# Patient Record
Sex: FEMALE | Race: BLACK OR AFRICAN AMERICAN | URBAN - METROPOLITAN AREA
[De-identification: names, ages, dates, MRNs, and addresses within clinical notes are randomized per-mention and may not be internally consistent; named-entity substitution may affect disease eponyms.]

---

## 2017-01-09 ENCOUNTER — APPOINTMENT (RX ONLY)
Dept: URBAN - METROPOLITAN AREA CLINIC 23 | Facility: CLINIC | Age: 72
Setting detail: DERMATOLOGY
End: 2017-01-09

## 2017-01-09 DIAGNOSIS — L21.8 OTHER SEBORRHEIC DERMATITIS: ICD-10-CM

## 2017-01-09 DIAGNOSIS — L65.9 NONSCARRING HAIR LOSS, UNSPECIFIED: ICD-10-CM

## 2017-01-09 PROBLEM — J30.1 ALLERGIC RHINITIS DUE TO POLLEN: Status: ACTIVE | Noted: 2017-01-09

## 2017-01-09 PROBLEM — L29.8 OTHER PRURITUS: Status: ACTIVE | Noted: 2017-01-09

## 2017-01-09 PROBLEM — G40.909 EPILEPSY, UNSPECIFIED, NOT INTRACTABLE, WITHOUT STATUS EPILEPTICUS: Status: ACTIVE | Noted: 2017-01-09

## 2017-01-09 PROBLEM — M12.9 ARTHROPATHY, UNSPECIFIED: Status: ACTIVE | Noted: 2017-01-09

## 2017-01-09 PROBLEM — L85.3 XEROSIS CUTIS: Status: ACTIVE | Noted: 2017-01-09

## 2017-01-09 PROCEDURE — ? PRESCRIPTION

## 2017-01-09 PROCEDURE — 99213 OFFICE O/P EST LOW 20 MIN: CPT

## 2017-01-09 PROCEDURE — ? OTHER

## 2017-01-09 RX ORDER — FLUOCINOLONE ACETONIDE 0.11 MG/ML
OIL TOPICAL
Qty: 1 | Refills: 4 | Status: ERX

## 2017-01-09 ASSESSMENT — LOCATION SIMPLE DESCRIPTION DERM: LOCATION SIMPLE: LEFT FOREHEAD

## 2017-01-09 ASSESSMENT — LOCATION ZONE DERM: LOCATION ZONE: FACE

## 2017-01-09 ASSESSMENT — LOCATION DETAILED DESCRIPTION DERM: LOCATION DETAILED: LEFT SUPERIOR FOREHEAD

## 2017-01-09 NOTE — PROCEDURE: MIPS QUALITY
Quality 111:Pneumonia Vaccination Status For Older Adults: Pneumococcal Vaccination Previously Received
Detail Level: Detailed
Quality 130: Documentation Of Current Medications In The Medical Record: Current Medications with Name, Dosage, Frequency, or Route not Documented, Reason not Given

## 2017-01-09 NOTE — PROCEDURE: OTHER
Detail Level: Detailed
Other (Free Text): Discussed with pt that Alopecia is due to age and hormones
Note Text (......Xxx Chief Complaint.): This diagnosis correlates with the

## 2017-01-13 PROBLEM — N20.0 KIDNEY STONE: Status: ACTIVE | Noted: 2017-01-13

## 2017-01-18 PROBLEM — Z79.899 ENCOUNTER FOR MEDICATION MANAGEMENT: Status: ACTIVE | Noted: 2017-01-18

## 2017-01-18 PROBLEM — T88.7XXA MEDICATION SIDE EFFECTS: Status: ACTIVE | Noted: 2017-01-18

## 2017-01-18 PROBLEM — G45.9 TIA ON MEDICATION: Status: ACTIVE | Noted: 2017-01-18

## 2017-01-27 ENCOUNTER — APPOINTMENT (OUTPATIENT)
Dept: GENERAL RADIOLOGY | Age: 72
End: 2017-01-27
Attending: PHYSICIAN ASSISTANT
Payer: MEDICARE

## 2017-01-27 ENCOUNTER — APPOINTMENT (OUTPATIENT)
Dept: CT IMAGING | Age: 72
End: 2017-01-27
Attending: PHYSICIAN ASSISTANT
Payer: MEDICARE

## 2017-01-27 ENCOUNTER — HOSPITAL ENCOUNTER (EMERGENCY)
Age: 72
Discharge: HOME | End: 2017-01-27
Attending: EMERGENCY MEDICINE
Payer: MEDICARE

## 2017-01-27 VITALS
TEMPERATURE: 98.3 F | HEART RATE: 69 BPM | OXYGEN SATURATION: 100 % | DIASTOLIC BLOOD PRESSURE: 78 MMHG | HEIGHT: 62 IN | RESPIRATION RATE: 16 BRPM | SYSTOLIC BLOOD PRESSURE: 175 MMHG | BODY MASS INDEX: 27.05 KG/M2 | WEIGHT: 147 LBS

## 2017-01-27 DIAGNOSIS — S29.012A MUSCLE STRAIN OF RIGHT UPPER BACK, INITIAL ENCOUNTER: ICD-10-CM
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
## 2017-01-27 DIAGNOSIS — M25.512 LEFT SHOULDER PAIN, UNSPECIFIED CHRONICITY: ICD-10-CM

## 2017-01-27 PROCEDURE — 99284 EMERGENCY DEPT VISIT MOD MDM: CPT | Performed by: PHYSICIAN ASSISTANT

## 2017-01-27 PROCEDURE — 73030 X-RAY EXAM OF SHOULDER: CPT

## 2017-01-27 PROCEDURE — 70450 CT HEAD/BRAIN W/O DYE: CPT

## 2017-01-27 NOTE — DISCHARGE INSTRUCTIONS
Musculoskeletal Pain: Care Instructions  Your Care Instructions  Different problems with the bones, muscles, nerves, ligaments, and tendons in the body can cause pain. One or more areas of your body may ache or burn. Or they may feel tired, stiff, or sore. The medical term for this type of pain is musculoskeletal pain. It can have many different causes. Sometimes the pain is caused by an injury such as a strain or sprain. Or you might have pain from using one part of your body in the same way over and over again. This is called overuse. In some cases, the cause of the pain is another health problem such as arthritis or fibromyalgia. The doctor will examine you and ask you questions about your health to help find the cause of your pain. Blood tests or imaging tests like an X-ray may also be helpful. But sometimes doctors can't find a cause of the pain. Treatment depends on your symptoms and the cause of the pain, if known. The doctor has checked you carefully, but problems can develop later. If you notice any problems or new symptoms, get medical treatment right away. Follow-up care is a key part of your treatment and safety. Be sure to make and go to all appointments, and call your doctor if you are having problems. It's also a good idea to know your test results and keep a list of the medicines you take. How can you care for yourself at home? · Rest until you feel better. · Do not do anything that makes the pain worse. Return to exercise gradually if you feel better and your doctor says it's okay. · Be safe with medicines. Read and follow all instructions on the label. ¨ If the doctor gave you a prescription medicine for pain, take it as prescribed. ¨ If you are not taking a prescription pain medicine, ask your doctor if you can take an over-the-counter medicine. · Put ice or a cold pack on the area for 10 to 20 minutes at a time to ease pain. Put a thin cloth between the ice and your skin.   When should you call for help? Call your doctor now or seek immediate medical care if:  · You have new pain, or your pain gets worse. · You have new symptoms such as a fever, a rash, or chills. Watch closely for changes in your health, and be sure to contact your doctor if:  · You do not get better as expected. Where can you learn more? Go to [employer]  Enter Q624 in the search box to learn more about \"Musculoskeletal Pain: Care Instructions. \"   © 1503-8842 [employer], Incorporated. Care instructions adapted under license by [address] [place] [address] (which disclaims liability or warranty for this information). This care instruction is for use with your licensed healthcare professional. If you have questions about a medical condition or this instruction, always ask your healthcare professional. [place] [address] any warranty or liability for your use of this information.   Content Version: 79.2.716443; Current as of: November 20, 2015
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
## 2017-01-28 NOTE — ED NOTES
I have reviewed medications, follow up provider options, and discharge instructions with the patient. The patient verbalized understanding. Copy of discharge information given to patient upon discharge. Patient discharged in no distress. Patient ambulatory to waiting area with family.  No questions at this time

## 2017-01-30 ENCOUNTER — PATIENT OUTREACH (OUTPATIENT)
Dept: CASE MANAGEMENT | Age: 72
End: 2017-01-30

## 2017-01-30 NOTE — PROGRESS NOTES
Multiple attempts on 3 different numbers made through out the day with no answers and no return calls and full vmail boxes. Vj Cason, TESSAN/ Care Coordinator  [address] Jonathan  / [place], [address] [facility]  [email or web address]  This note will not be viewable in MyChart.

## 2017-03-15 ENCOUNTER — HOSPITAL ENCOUNTER (OUTPATIENT)
Dept: LAB | Age: 72
Discharge: HOME | End: 2017-03-15

## 2017-03-15 PROCEDURE — 88305 TISSUE EXAM BY PATHOLOGIST: CPT | Performed by: INTERNAL MEDICINE

## 2017-05-10 ENCOUNTER — HOSPITAL ENCOUNTER (OUTPATIENT)
Dept: GENERAL RADIOLOGY | Age: 72
Discharge: HOME | End: 2017-05-10
Attending: INTERNAL MEDICINE
Payer: MEDICARE

## 2017-05-10 VITALS — BODY MASS INDEX: 24.84 KG/M2 | WEIGHT: 135 LBS | HEIGHT: 62 IN

## 2017-05-10 DIAGNOSIS — K22.2 ESOPHAGEAL STRICTURE: ICD-10-CM

## 2017-05-10 DIAGNOSIS — Q39.6 ESOPHAGEAL DIVERTICULUM: ICD-10-CM

## 2017-05-10 DIAGNOSIS — Z98.890 HISTORY OF THYROID SURGERY: ICD-10-CM

## 2017-05-10 PROCEDURE — 74220 X-RAY XM ESOPHAGUS 1CNTRST: CPT

## 2017-05-10 PROCEDURE — 74011000250 HC RX REV CODE- 250: Performed by: INTERNAL MEDICINE

## 2017-05-10 PROCEDURE — 74011000255 HC RX REV CODE- 255: Performed by: INTERNAL MEDICINE

## 2017-05-10 RX ADMIN — BARIUM SULFATE 700 MG: 700 TABLET ORAL at 11:35

## 2017-05-10 RX ADMIN — BARIUM SULFATE 135 ML: 980 POWDER, FOR SUSPENSION ORAL at 11:33

## 2017-05-10 RX ADMIN — BARIUM SULFATE 355 ML: 0.6 SUSPENSION ORAL at 11:33

## 2017-05-10 RX ADMIN — ANTACID/ANTIFLATULENT 4 G: 380; 550; 10; 10 GRANULE, EFFERVESCENT ORAL at 11:32

## 2017-05-17 ENCOUNTER — HOSPITAL ENCOUNTER (OUTPATIENT)
Dept: LAB | Age: 72
Discharge: HOME | End: 2017-05-17

## 2017-05-17 PROCEDURE — 88305 TISSUE EXAM BY PATHOLOGIST: CPT | Performed by: INTERNAL MEDICINE

## 2017-06-07 ENCOUNTER — HOSPITAL ENCOUNTER (OUTPATIENT)
Dept: LAB | Age: 72
Discharge: HOME | End: 2017-06-07
Attending: INTERNAL MEDICINE
Payer: MEDICARE

## 2017-06-07 ENCOUNTER — HOSPITAL ENCOUNTER (OUTPATIENT)
Dept: CT IMAGING | Age: 72
Discharge: HOME | End: 2017-06-07
Attending: INTERNAL MEDICINE
Payer: MEDICARE

## 2017-06-07 DIAGNOSIS — R63.4 WEIGHT LOSS: ICD-10-CM

## 2017-06-07 DIAGNOSIS — K22.10 ESOPHAGEAL EROSIONS: ICD-10-CM

## 2017-06-07 LAB — CREAT SERPL-MCNC: 1.1 MG/DL (ref 0.6–1)

## 2017-06-07 PROCEDURE — 82565 ASSAY OF CREATININE: CPT | Performed by: INTERNAL MEDICINE

## 2017-06-07 PROCEDURE — 74011000258 HC RX REV CODE- 258: Performed by: INTERNAL MEDICINE

## 2017-06-07 PROCEDURE — 74160 CT ABDOMEN W/CONTRAST: CPT

## 2017-06-07 PROCEDURE — 74011636320 HC RX REV CODE- 636/320: Performed by: INTERNAL MEDICINE

## 2017-06-07 PROCEDURE — 36415 COLL VENOUS BLD VENIPUNCTURE: CPT | Performed by: INTERNAL MEDICINE

## 2017-06-07 RX ORDER — SODIUM CHLORIDE 0.9 % (FLUSH) 0.9 %
10 SYRINGE (ML) INJECTION
Status: COMPLETED | OUTPATIENT
Start: 2017-06-07 | End: 2017-06-07

## 2017-06-07 RX ADMIN — DIATRIZOATE MEGLUMINE AND DIATRIZOATE SODIUM 15 ML: 660; 100 LIQUID ORAL; RECTAL at 15:10

## 2017-06-07 RX ADMIN — Medication 10 ML: at 15:10

## 2017-06-07 RX ADMIN — SODIUM CHLORIDE 100 ML: 900 INJECTION, SOLUTION INTRAVENOUS at 15:10

## 2017-06-07 RX ADMIN — IOPAMIDOL 100 ML: 755 INJECTION, SOLUTION INTRAVENOUS at 15:10

## 2017-07-12 PROBLEM — K80.20 ASYMPTOMATIC GALLSTONES: Status: ACTIVE | Noted: 2017-07-12

## 2017-08-02 ENCOUNTER — APPOINTMENT (RX ONLY)
Dept: URBAN - METROPOLITAN AREA CLINIC 23 | Facility: CLINIC | Age: 72
Setting detail: DERMATOLOGY
End: 2017-08-02

## 2017-08-02 DIAGNOSIS — L82.1 OTHER SEBORRHEIC KERATOSIS: ICD-10-CM

## 2017-08-02 DIAGNOSIS — L85.3 XEROSIS CUTIS: ICD-10-CM

## 2017-08-02 DIAGNOSIS — L21.8 OTHER SEBORRHEIC DERMATITIS: ICD-10-CM

## 2017-08-02 PROBLEM — E03.9 HYPOTHYROIDISM, UNSPECIFIED: Status: ACTIVE | Noted: 2017-08-02

## 2017-08-02 PROBLEM — L29.8 OTHER PRURITUS: Status: ACTIVE | Noted: 2017-08-02

## 2017-08-02 PROCEDURE — 99213 OFFICE O/P EST LOW 20 MIN: CPT

## 2017-08-02 PROCEDURE — ? PRESCRIPTION

## 2017-08-02 PROCEDURE — ? TREATMENT REGIMEN

## 2017-08-02 PROCEDURE — ? COUNSELING

## 2017-08-02 RX ORDER — FLUOCINOLONE ACETONIDE 0.11 MG/ML
OIL TOPICAL
Qty: 1 | Refills: 5 | Status: ERX

## 2017-08-02 ASSESSMENT — LOCATION SIMPLE DESCRIPTION DERM
LOCATION SIMPLE: UPPER BACK
LOCATION SIMPLE: RIGHT CHEEK
LOCATION SIMPLE: RIGHT THIGH
LOCATION SIMPLE: RIGHT PRETIBIAL REGION
LOCATION SIMPLE: LEFT PRETIBIAL REGION
LOCATION SIMPLE: LEFT THIGH
LOCATION SIMPLE: RIGHT FOREHEAD

## 2017-08-02 ASSESSMENT — LOCATION ZONE DERM
LOCATION ZONE: TRUNK
LOCATION ZONE: FACE
LOCATION ZONE: LEG

## 2017-08-02 ASSESSMENT — LOCATION DETAILED DESCRIPTION DERM
LOCATION DETAILED: LEFT DISTAL PRETIBIAL REGION
LOCATION DETAILED: INFERIOR THORACIC SPINE
LOCATION DETAILED: LEFT ANTERIOR DISTAL THIGH
LOCATION DETAILED: RIGHT DISTAL PRETIBIAL REGION
LOCATION DETAILED: RIGHT ANTERIOR DISTAL THIGH
LOCATION DETAILED: RIGHT MEDIAL MALAR CHEEK
LOCATION DETAILED: RIGHT INFERIOR FOREHEAD

## 2017-08-02 NOTE — PROCEDURE: TREATMENT REGIMEN
Plan: Advised to use less soap when washing body then apply a thick moisturizer
Detail Level: Zone
Continue Regimen: Vaseline daily

## 2017-08-02 NOTE — PROCEDURE: MIPS QUALITY
Quality 111:Pneumonia Vaccination Status For Older Adults: Pneumococcal Vaccination Previously Received
Quality 130: Documentation Of Current Medications In The Medical Record: Current Medications with Name, Dosage, Frequency, or Route not Documented, Reason not Given
Detail Level: Detailed

## 2017-10-27 PROBLEM — R47.89 EPISODES OF SPEECH ARREST: Status: ACTIVE | Noted: 2017-10-27

## 2017-10-30 ENCOUNTER — HOSPITAL ENCOUNTER (OUTPATIENT)
Dept: MAMMOGRAPHY | Age: 72
Discharge: HOME | End: 2017-10-30
Attending: FAMILY MEDICINE
Payer: MEDICARE

## 2017-10-30 VITALS
OXYGEN SATURATION: 99 % | SYSTOLIC BLOOD PRESSURE: 219 MMHG | DIASTOLIC BLOOD PRESSURE: 96 MMHG | TEMPERATURE: 97.4 F | HEART RATE: 68 BPM

## 2017-10-30 DIAGNOSIS — R92.1 BREAST CALCIFICATION, RIGHT: ICD-10-CM

## 2017-10-30 PROCEDURE — 19081 BX BREAST 1ST LESION STRTCTC: CPT

## 2017-10-30 PROCEDURE — 77065 DX MAMMO INCL CAD UNI: CPT

## 2017-10-30 PROCEDURE — 88305 TISSUE EXAM BY PATHOLOGIST: CPT | Performed by: FAMILY MEDICINE

## 2017-10-30 PROCEDURE — 74011250636 HC RX REV CODE- 250/636: Performed by: RADIOLOGY

## 2017-10-30 PROCEDURE — 74011250636 HC RX REV CODE- 250/636: Performed by: FAMILY MEDICINE

## 2017-10-30 RX ADMIN — SODIUM CHLORIDE 250 ML: 900 INJECTION, SOLUTION INTRAVENOUS at 09:47

## 2017-10-30 RX ADMIN — MEPIVACAINE HYDROCHLORIDE 13.5 ML: 20 INJECTION, SOLUTION EPIDURAL; INFILTRATION at 09:43

## 2018-07-31 ENCOUNTER — ANESTHESIA EVENT (OUTPATIENT)
Dept: ENDOSCOPY | Age: 73
End: 2018-07-31
Payer: MEDICARE

## 2018-07-31 RX ORDER — ONDANSETRON 2 MG/ML
4 INJECTION INTRAMUSCULAR; INTRAVENOUS ONCE
Status: CANCELLED | OUTPATIENT
Start: 2018-07-31 | End: 2018-07-31

## 2018-07-31 RX ORDER — SODIUM CHLORIDE, SODIUM LACTATE, POTASSIUM CHLORIDE, CALCIUM CHLORIDE 600; 310; 30; 20 MG/100ML; MG/100ML; MG/100ML; MG/100ML
100 INJECTION, SOLUTION INTRAVENOUS CONTINUOUS
Status: CANCELLED | OUTPATIENT
Start: 2018-07-31

## 2018-07-31 RX ORDER — NALOXONE HYDROCHLORIDE 0.4 MG/ML
0.1 INJECTION, SOLUTION INTRAMUSCULAR; INTRAVENOUS; SUBCUTANEOUS AS NEEDED
Status: CANCELLED | OUTPATIENT
Start: 2018-07-31

## 2018-07-31 RX ORDER — OXYCODONE HYDROCHLORIDE 5 MG/1
10 TABLET ORAL
Status: CANCELLED | OUTPATIENT
Start: 2018-07-31 | End: 2018-08-01

## 2018-07-31 RX ORDER — OXYCODONE HYDROCHLORIDE 5 MG/1
5 TABLET ORAL
Status: CANCELLED | OUTPATIENT
Start: 2018-07-31 | End: 2018-08-01

## 2018-07-31 RX ORDER — HYDROMORPHONE HYDROCHLORIDE 2 MG/ML
0.5 INJECTION, SOLUTION INTRAMUSCULAR; INTRAVENOUS; SUBCUTANEOUS
Status: CANCELLED | OUTPATIENT
Start: 2018-07-31

## 2018-07-31 RX ORDER — ALBUTEROL SULFATE 0.83 MG/ML
2.5 SOLUTION RESPIRATORY (INHALATION) AS NEEDED
Status: CANCELLED | OUTPATIENT
Start: 2018-07-31

## 2018-08-01 ENCOUNTER — APPOINTMENT (OUTPATIENT)
Dept: GENERAL RADIOLOGY | Age: 73
End: 2018-08-01
Attending: INTERNAL MEDICINE
Payer: MEDICARE

## 2018-08-01 ENCOUNTER — HOSPITAL ENCOUNTER (OUTPATIENT)
Age: 73
Setting detail: OUTPATIENT SURGERY
Discharge: HOME | End: 2018-08-01
Attending: INTERNAL MEDICINE | Admitting: INTERNAL MEDICINE
Payer: MEDICARE

## 2018-08-01 ENCOUNTER — ANESTHESIA (OUTPATIENT)
Dept: ENDOSCOPY | Age: 73
End: 2018-08-01
Payer: MEDICARE

## 2018-08-01 VITALS
HEART RATE: 59 BPM | TEMPERATURE: 98.6 F | DIASTOLIC BLOOD PRESSURE: 83 MMHG | SYSTOLIC BLOOD PRESSURE: 169 MMHG | RESPIRATION RATE: 21 BRPM | HEIGHT: 62 IN | WEIGHT: 126 LBS | BODY MASS INDEX: 23.19 KG/M2 | OXYGEN SATURATION: 100 %

## 2018-08-01 PROCEDURE — 76060000032 HC ANESTHESIA 0.5 TO 1 HR: Performed by: INTERNAL MEDICINE

## 2018-08-01 PROCEDURE — 74011250636 HC RX REV CODE- 250/636: Performed by: ANESTHESIOLOGY

## 2018-08-01 PROCEDURE — 74011250636 HC RX REV CODE- 250/636

## 2018-08-01 PROCEDURE — 76040000025: Performed by: INTERNAL MEDICINE

## 2018-08-01 RX ORDER — FENTANYL CITRATE 50 UG/ML
100 INJECTION, SOLUTION INTRAMUSCULAR; INTRAVENOUS ONCE
Status: DISCONTINUED | OUTPATIENT
Start: 2018-08-01 | End: 2018-08-01 | Stop reason: HOSPADM

## 2018-08-01 RX ORDER — MIDAZOLAM HYDROCHLORIDE 1 MG/ML
2 INJECTION, SOLUTION INTRAMUSCULAR; INTRAVENOUS
Status: DISCONTINUED | OUTPATIENT
Start: 2018-08-01 | End: 2018-08-01 | Stop reason: HOSPADM

## 2018-08-01 RX ORDER — PROPOFOL 10 MG/ML
INJECTION, EMULSION INTRAVENOUS
Status: DISCONTINUED | OUTPATIENT
Start: 2018-08-01 | End: 2018-08-01 | Stop reason: HOSPADM

## 2018-08-01 RX ORDER — MIDAZOLAM HYDROCHLORIDE 1 MG/ML
2 INJECTION, SOLUTION INTRAMUSCULAR; INTRAVENOUS ONCE
Status: DISCONTINUED | OUTPATIENT
Start: 2018-08-01 | End: 2018-08-01 | Stop reason: HOSPADM

## 2018-08-01 RX ORDER — PROPOFOL 10 MG/ML
INJECTION, EMULSION INTRAVENOUS AS NEEDED
Status: DISCONTINUED | OUTPATIENT
Start: 2018-08-01 | End: 2018-08-01 | Stop reason: HOSPADM

## 2018-08-01 RX ORDER — SODIUM CHLORIDE, SODIUM LACTATE, POTASSIUM CHLORIDE, CALCIUM CHLORIDE 600; 310; 30; 20 MG/100ML; MG/100ML; MG/100ML; MG/100ML
100 INJECTION, SOLUTION INTRAVENOUS CONTINUOUS
Status: DISCONTINUED | OUTPATIENT
Start: 2018-08-01 | End: 2018-08-01 | Stop reason: HOSPADM

## 2018-08-01 RX ADMIN — PROPOFOL 160 MCG/KG/MIN: 10 INJECTION, EMULSION INTRAVENOUS at 15:11

## 2018-08-01 RX ADMIN — PROPOFOL 50 MG: 10 INJECTION, EMULSION INTRAVENOUS at 15:11

## 2018-08-01 RX ADMIN — SODIUM CHLORIDE, SODIUM LACTATE, POTASSIUM CHLORIDE, AND CALCIUM CHLORIDE 100 ML/HR: 600; 310; 30; 20 INJECTION, SOLUTION INTRAVENOUS at 14:02

## 2018-08-01 NOTE — ROUTINE PROCESS
Pt discharged home via wheelchair with family. Discharge instructions given to patient and family. Vital signs stable upon discharge.

## 2018-08-01 NOTE — ANESTHESIA PREPROCEDURE EVALUATION
Anesthetic History Review of Systems / Medical History Patient summary reviewed, nursing notes reviewed and pertinent labs reviewed Pulmonary Pertinent negatives: No sleep apnea Neuro/Psych  
 
seizures: well controlled Cardiovascular Exercise tolerance: >4 METS 
  
GI/Hepatic/Renal 
  
 
 
 
 
 
 Endo/Other Hypothyroidism: well controlled Other Findings Physical Exam 
 
Airway Mallampati: II 
TM Distance: 4 - 6 cm Neck ROM: normal range of motion Mouth opening: Normal 
 
 Cardiovascular Regular rate and rhythm,  S1 and S2 normal,  no murmur, click, rub, or gallop Dental 
 
Dentition: Edentulous Pulmonary Breath sounds clear to auscultation Abdominal 
 
 
 
 Other Findings Anesthetic Plan ASA: 3 Anesthesia type: total IV anesthesia Induction: Intravenous Anesthetic plan and risks discussed with: Patient

## 2018-08-01 NOTE — DISCHARGE INSTRUCTIONS
Gastrointestinal Esophagogastroduodenoscopy (EGD) - Upper Exam Discharge Instructions    1. Call Dr. Maribeth Sevilla at [phone] for any problems or questions. 2. Contact the doctor's office for follow up appointment as directed. 3. Medication may cause drowsiness for several hours, therefore, do not drive or  operate machinery for remainder of the day. 4. No alcohol today. 5. Ordinarily, you may resume regular diet and activity after exam unless otherwise  specified by your physician. 6. For mild soreness in your throat you may use Cepacol throat lozenges or warm salt-water gargles as needed. Any additional instructions: Instructions given to [address] and other family members.   Instructions given by:

## 2018-08-01 NOTE — ANESTHESIA POSTPROCEDURE EVALUATION
Post-Anesthesia Evaluation and Assessment Patient: Indira Navarrete MRN: [id number]  SSN: [id number] YOB: 1945  Age: 67 y.o. Sex: female Cardiovascular Function/Vital Signs Visit Vitals  /83  Pulse (!) 59  Temp 37 °C (98.6 °F)  Resp 14  
 Ht 5' 2\" (1.575 m)  Wt 57.2 kg (126 lb)  SpO2 100%  BMI 23.05 kg/m2 Patient is status post total IV anesthesia anesthesia for Procedure(s): ESOPHAGOGASTRODUODENOSCOPY (EGD)/ BMI=23 ESOPHAGEAL DILATION. Nausea/Vomiting: None Postoperative hydration reviewed and adequate. Pain: 
Pain Scale 1: Numeric (0 - 10) (08/01/18 1544) Pain Intensity 1: 0 (08/01/18 1544) Managed Neurological Status: At baseline Mental Status and Level of Consciousness: Alert and oriented Pulmonary Status:  
O2 Device: Nasal cannula (08/01/18 1544) Adequate oxygenation and airway patent Complications related to anesthesia: None Post-anesthesia assessment completed. No concerns Signed By: Bree William MD   
 August 1, 2018

## 2018-08-02 NOTE — PROCEDURES
OPERATIVE NOTE    Date of Procedure: 2018   Preoperative Diagnosis: Dysphagia, Abnormal weight loss [R63.4]  High esophageal stricture with inability to advance a standard GIF Adult Endoscope through the stricture on attempted EGD 18. Postoperative Diagnosis: High esophageal stricture - dilated with Savary dilators to 12 mm over guide wire under Fluoro. Procedure(s):  ESOPHAGOGASTRODUODENOSCOPY (EGD)/ BMI=23  ESOPHAGEAL DILATION  Surgeon(s) and Role:     * Duarte Bautista MD - Primary  Surgical Staff:  Endoscopy RN-1: Peewee Caldwell RN  Respiratory Therapist: Leticia Forte, RT  No case tracking events are documented in the log. Anesthesia: MAC   Estimated Blood Loss: < 20 mL  Specimens: * No specimens in log *     Procedure:  After obtaining informed consent, the patient was brought to the Fluoroscopy Suite and placed in the left lateral position on Fluoroscopy table and received IV anesthesia. See anesthesia records for details. The  endoscope was advanced under direct vision without difficulty. A stricture and angulation was noted just distal to the Upper esophageal sphincter. The esophagus, stomach (including retroflexed views) and duodenum were evaluated. Leaving the guide wire in place the scope was withdrawn and Savary Dilation performed - 8 mm without resistance or heme, 10, 11 and 12 mm with mild resistance and bleeding. Repeat Endoscopy was performed with a standard adult GIF endoscope . The patient was taken to the recovery area in stable condition. COMMENTS:  8,10, 11 and 12 mm Savary dilators were passed sequentially in the usual fashion without difficulty under fluoroscopy guidance after the initial endoscopy. Mild resistance and small amount of blood was noted on the larger three dilators. Repeat endoscopy after dilation revealed mild superficial tear about 15 -18 cms from the incisors indicating successful dilation.  There was mild bleeding with spontaneous hemostasis confirmed before withdrawal.    Findings:  OROPHARYNX:  Cords and Pyriform recesses appeared normal.  ESOPHAGUS:  The proximal esophagus at the esophageal inlet just distal to the cricopharyngeus showed marked narrowing and angulation. The mid esophagus showed mild mucosal traum after dilation. The distal esophagus appeared normal. The Z line was intact without evidence of esophagitis, ulcerations, stricture or Hickey's. See Dilation and post dilation findings above. STOMACH:  The fundus on antegrade and retroflexed views was normal. The body, antrum and pylorus also appeared normal.  DUODENUM:  The bulb and second portions appeared normal.    Recommendations:   Routine post endoscopy - dilation instructions. Soft diet today - to be advanced as tolerated. Dysphagia precautions with avoidance of chunky meats, dry bread and large pills. Office will call to schedule FU OV or repeat EGD and dilation with Dr. Pola See based on clinical course. She is also advised to call our Office next week to report on her progress and arrange followup. Findings and recommendations were reviewed with patient and attendant (sister) in recovery area after the procedure. Complications: None.     Implants: * No implants in log *    Thomas Arreguin MD

## 2018-11-10 ENCOUNTER — ANESTHESIA EVENT (OUTPATIENT)
Dept: ENDOSCOPY | Age: 73
End: 2018-11-10
Payer: MEDICARE

## 2018-11-10 RX ORDER — SODIUM CHLORIDE 0.9 % (FLUSH) 0.9 %
5-10 SYRINGE (ML) INJECTION AS NEEDED
Status: CANCELLED | OUTPATIENT
Start: 2018-11-10

## 2018-11-10 RX ORDER — SODIUM CHLORIDE, SODIUM LACTATE, POTASSIUM CHLORIDE, CALCIUM CHLORIDE 600; 310; 30; 20 MG/100ML; MG/100ML; MG/100ML; MG/100ML
100 INJECTION, SOLUTION INTRAVENOUS CONTINUOUS
Status: CANCELLED | OUTPATIENT
Start: 2018-11-10

## 2018-11-12 ENCOUNTER — ANESTHESIA (OUTPATIENT)
Dept: ENDOSCOPY | Age: 73
End: 2018-11-12
Payer: MEDICARE

## 2018-11-12 ENCOUNTER — HOSPITAL ENCOUNTER (OUTPATIENT)
Age: 73
Setting detail: OUTPATIENT SURGERY
Discharge: HOME | End: 2018-11-12
Attending: INTERNAL MEDICINE | Admitting: INTERNAL MEDICINE
Payer: MEDICARE

## 2018-11-12 VITALS
OXYGEN SATURATION: 97 % | TEMPERATURE: 98.1 F | HEIGHT: 62 IN | HEART RATE: 66 BPM | SYSTOLIC BLOOD PRESSURE: 154 MMHG | RESPIRATION RATE: 18 BRPM | WEIGHT: 126 LBS | DIASTOLIC BLOOD PRESSURE: 76 MMHG | BODY MASS INDEX: 23.19 KG/M2

## 2018-11-12 PROCEDURE — 76040000025: Performed by: INTERNAL MEDICINE

## 2018-11-12 PROCEDURE — 74011250636 HC RX REV CODE- 250/636: Performed by: ANESTHESIOLOGY

## 2018-11-12 PROCEDURE — 76060000031 HC ANESTHESIA FIRST 0.5 HR: Performed by: INTERNAL MEDICINE

## 2018-11-12 PROCEDURE — 74011250636 HC RX REV CODE- 250/636

## 2018-11-12 RX ORDER — PROPOFOL 10 MG/ML
INJECTION, EMULSION INTRAVENOUS AS NEEDED
Status: DISCONTINUED | OUTPATIENT
Start: 2018-11-12 | End: 2018-11-12 | Stop reason: HOSPADM

## 2018-11-12 RX ORDER — SODIUM CHLORIDE, SODIUM LACTATE, POTASSIUM CHLORIDE, CALCIUM CHLORIDE 600; 310; 30; 20 MG/100ML; MG/100ML; MG/100ML; MG/100ML
100 INJECTION, SOLUTION INTRAVENOUS CONTINUOUS
Status: DISCONTINUED | OUTPATIENT
Start: 2018-11-12 | End: 2018-11-12 | Stop reason: HOSPADM

## 2018-11-12 RX ORDER — PROPOFOL 10 MG/ML
INJECTION, EMULSION INTRAVENOUS
Status: DISCONTINUED | OUTPATIENT
Start: 2018-11-12 | End: 2018-11-12 | Stop reason: HOSPADM

## 2018-11-12 RX ADMIN — PROPOFOL 20 MG: 10 INJECTION, EMULSION INTRAVENOUS at 11:08

## 2018-11-12 RX ADMIN — SODIUM CHLORIDE, SODIUM LACTATE, POTASSIUM CHLORIDE, AND CALCIUM CHLORIDE 100 ML/HR: 600; 310; 30; 20 INJECTION, SOLUTION INTRAVENOUS at 10:00

## 2018-11-12 RX ADMIN — PROPOFOL 30 MG: 10 INJECTION, EMULSION INTRAVENOUS at 11:05

## 2018-11-12 RX ADMIN — PROPOFOL 100 MCG/KG/MIN: 10 INJECTION, EMULSION INTRAVENOUS at 11:05

## 2018-11-12 NOTE — ROUTINE PROCESS
Pt. Discharged to car by Richelle Chavira with family . Vital signs stable. Able to tolerate PO fluids. Passing gas.  Seen by MD.

## 2018-11-12 NOTE — H&P
Gastroenterology Outpatient History and Physical 
 
Patient: James Mercado Physician: Ann Hamm MD 
 
Vital Signs: Blood pressure 195/86, pulse 73, temperature 98.1 °F (36.7 °C), resp. rate 14, height 5' 2\" (1.575 m), weight 57.2 kg (126 lb), SpO2 100 %, not currently breastfeeding. Allergies: Allergies Allergen Reactions  Bee Sting [Sting, Bee] Anaphylaxis  Benadryl [Diphenhydramine Hcl] Seizures  Lidocaine Other (comments)  
  jittery and lost voice; trembling; stuttering  Amoxicillin Other (comments)  
  stuttering  Clinoril [Sulindac] Hives  Lipitor [Atorvastatin] Myalgia  Lortab [Hydrocodone-Acetaminophen] Other (comments) affected speech  Lotabs Other (comments) Effects speech  Methotrexate Other (comments)  
  messed up immune system  Remicade [Infliximab] Other (comments)  
  messed up immune system  Travatan [Travoprost (Benzalkonium)] Other (comments) elevated BP Chief Complaint: dysphagia, esophageal stricture History of Present Illness: 75 yr old female with esophageal stricture s/p dilation in the past here for subsequent dilation Justification for Procedure: esophageal stircture History: 
Past Medical History:  
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   
 cpap Past Surgical History:  
Procedure Laterality Date  HX BREAST BIOPSY Right 10/30/2017  HX CATARACT REMOVAL    
 HX COLONOSCOPY  03/15/2017 Dr Kai Carson  HX HEENT    
 thyroid surgery [place]  HX HYSTERECTOMY  1980  
 secondary to bleeding; fibroids  HX THYROIDECTOMY  06/2005 Dr. Jalyn Phillips Social History Socioeconomic History  Marital status:  Spouse name: Not on file  Number of children: Not on file  Years of education: Not on file  Highest education level: Not on file Social Needs  Financial resource strain: Not on file  Food insecurity - worry: Not on file  Food insecurity - inability: Not on file  Transportation needs - medical: Not on file  Transportation needs - non-medical: Not on file Occupational History  Not on file Tobacco Use  Smoking status: Never Smoker  Smokeless tobacco: Never Used Substance and Sexual Activity  Alcohol use: No  
 Drug use: No  
 Sexual activity: Not on file Other Topics Concern  Not on file Social History Narrative  Not on file Family History Problem Relation Age of Onset  Heart Attack Father  Heart Disease Father  Kidney Disease Mother Medications:  
Prior to Admission medications Medication Sig Start Date End Date Taking? Authorizing Provider  
levothyroxine (SYNTHROID) 88 mcg tablet Take 1 Tab by mouth Daily (before breakfast). 8/13/18  Yes La Mae MD  
cyanocobalamin (VITAMIN B12) 1,000 mcg/mL injection 1 mL by IntraMUSCular route every thirty (30) days. 6/29/18  Yes La Mae MD  
predniSONE (DELTASONE) 5 mg tablet TAKE 1 TABLET BY MOUTH daily 1/6/17  Yes Provider, Historical  
diclofenac-misoprostol (ARTHROTEC 75)  mg-mcg per tablet Take 1 Tab by mouth two (2) times a day. Yes Provider, Historical  
ABATACEPT/MALTOSE (ORENCIA IV) by IntraVENous route every thirty (30) days. Yes Provider, Historical  
famotidine (PEPCID) 40 mg/5 mL (8 mg/mL) suspension Take 5 mL by mouth two (2) times a day. 5/29/18   Kraig Costello MD  
gabapentin (NEURONTIN) 100 mg capsule Take 1 Cap by mouth two (2) times a day. 5/29/18   Kraig Costello MD  
diclofenac sodium (PENNSAID) 20 mg/gram /actuation(2 %) sopm 2 Pump(s) by Apply Externally route two (2) times a day. 2/1/17   Gilson Padilla MD  
 
 
Physical Exam:  
General: no distress HEENT: Head: Normocephalic, no lesions, without obvious abnormality. Heart: regular rate and rhythm, S1, S2 normal, no murmur, click, rub or gallop Lungs: chest clear, no wheezing, rales, normal symmetric air entry Abdominal: Bowel sounds are normal, liver is not enlarged, spleen is not enlarged Neurological: Grossly normal  
Extremities: extremities normal, atraumatic, no cyanosis or edema Findings/Diagnosis: esophageal stricture, dysphagia Plan of Care/Planned Procedure: egd with dilation Signed By: Vicki Dennis MD   
 November 12, 2018

## 2018-11-12 NOTE — ANESTHESIA PREPROCEDURE EVALUATION
Anesthetic History No history of anesthetic complications Review of Systems / Medical History Patient summary reviewed, nursing notes reviewed and pertinent labs reviewed Pulmonary Sleep apnea: CPAP Neuro/Psych Cardiovascular Hyperlipidemia Exercise tolerance: <4 METS Comments: Denies CP, SOB or changes in functional status GI/Hepatic/Renal 
  
GERD: well controlled Renal disease: stones PUD Endo/Other Hypothyroidism: well controlled Arthritis (RA) Other Findings Physical Exam 
 
Airway Mallampati: III 
TM Distance: 4 - 6 cm Neck ROM: normal range of motion Mouth opening: Normal 
 
 Cardiovascular Rhythm: regular Rate: normal 
 
 
 
 Dental 
 
Dentition: Full lower dentures and Full upper dentures Pulmonary Breath sounds clear to auscultation Abdominal 
 
 
 
 Other Findings Anesthetic Plan ASA: 2 Anesthesia type: total IV anesthesia Anesthetic plan and risks discussed with: Patient

## 2018-11-12 NOTE — DISCHARGE INSTRUCTIONS
Gastrointestinal Esophagogastroduodenoscopy (EGD) - Upper Exam Discharge Instructions    1. Call Dr. Nuria Thorne at [phone] for any problems or questions. 2. Contact the doctor's office for follow up appointment as directed. 3. Medication may cause drowsiness for several hours, therefore, do not drive or operate machinery for remainder of the day. 4. No alcohol today. 5. Ordinarily, you may resume regular diet and activity after exam unless otherwise specified by your physician. 6. For mild soreness in your throat you may use throat lozenges or warm salt-water gargles as needed. Any additional instructions:   Follow up as needed

## 2018-11-12 NOTE — PROCEDURES
Esophagogastroduodenoscopy    DATE of PROCEDURE: 11/12/2018    MEDICATIONS ADMINISTERED: MAC    INSTRUMENT: GIF    PROCEDURE:  After obtaining informed consent, the patient was placed in the left lateral position and sedated. The endoscope was advanced under direct vision without difficulty. The esophagus, stomach (including retroflexed views) and duodenum were evaluated. The patient was taken to the recovery area in stable condition. FINDINGS:  ESOPHAGUS:  Upon entry into the esophagus at about 15cm from entry there was evidence of prior seen esophageal stricture. This area was tortuous but able to be traversed today with EGD scope with minimal trauma. A 12 mm Savary dilator was passed in the usual fashion with mild resistance and small amount of blood was noted on the the dilators. This was followed by a 14mm savary with moderate resistance. Repeat endoscopy after dilation revealed Moderate rent and self-limited heme noted from about 15 -18 cms from the incisors indicating successful dilation.   STOMACH: Mild antral gastritis  DUODENUM: Normal     Estimated blood loss: 0-minimal     PLAN:  1. Repeat dilation in 4-6 weeks at the hospital again - fluoro not necessary  2.  Liquid x 24 hours    Emma Loredo MD  [facility], [address]

## 2018-11-12 NOTE — ANESTHESIA POSTPROCEDURE EVALUATION
Procedure(s): ESOPHAGOGASTRODUODENOSCOPY (EGD) / BMI 21 ESOPHAGEAL DILATION. Anesthesia Post Evaluation Multimodal analgesia: multimodal analgesia not used between 6 hours prior to anesthesia start to PACU discharge Patient location during evaluation: bedside Patient participation: complete - patient participated Level of consciousness: awake Pain score: 1 Pain management: adequate Airway patency: patent Anesthetic complications: no 
Cardiovascular status: acceptable Respiratory status: acceptable Hydration status: acceptable Comments: Pt doing well. Post anesthesia nausea and vomiting:  none Visit Vitals /60 Pulse 81 Temp 36.7 °C (98.1 °F) Resp 17 Ht 5' 2\" (1.575 m) Wt 57.2 kg (126 lb) SpO2 100% Breastfeeding? No  
BMI 23.05 kg/m²

## 2018-12-05 ENCOUNTER — HOSPITAL ENCOUNTER (OUTPATIENT)
Dept: MAMMOGRAPHY | Age: 73
Discharge: HOME | End: 2018-12-05
Attending: FAMILY MEDICINE
Payer: MEDICARE

## 2018-12-05 DIAGNOSIS — Z12.39 BREAST CANCER SCREENING, HIGH RISK PATIENT: ICD-10-CM

## 2018-12-05 PROCEDURE — 77067 SCR MAMMO BI INCL CAD: CPT

## 2019-01-14 PROBLEM — Z87.19 HISTORY OF ESOPHAGEAL STRICTURE: Status: ACTIVE | Noted: 2019-01-14

## 2019-01-21 ENCOUNTER — HOSPITAL ENCOUNTER (OUTPATIENT)
Dept: CT IMAGING | Age: 74
Discharge: HOME | End: 2019-01-21
Attending: FAMILY MEDICINE
Payer: MEDICARE

## 2019-01-21 DIAGNOSIS — N20.0 KIDNEY STONE: ICD-10-CM

## 2019-01-21 PROCEDURE — 74176 CT ABD & PELVIS W/O CONTRAST: CPT

## 2019-01-22 NOTE — PROGRESS NOTES
Spoke to MsAnnie Rose and made her aware that we do not need to have her go to urology as her CT did not show stones. She is aware she has gallstones and they are not causing trouble so we will monitor them for now. She was made aware that she does have some abnormalities with her liver. She is seeing Gastroenterology next week to follow up about her GI bleed, and we will send this CT to them for an opinion regarding the liver abnormalities and next steps.  Of note, her liver blood work has been normal.

## 2019-02-21 ENCOUNTER — ANESTHESIA EVENT (OUTPATIENT)
Dept: ENDOSCOPY | Age: 74
End: 2019-02-21
Payer: MEDICARE

## 2019-02-21 RX ORDER — SODIUM CHLORIDE 0.9 % (FLUSH) 0.9 %
5-40 SYRINGE (ML) INJECTION AS NEEDED
Status: CANCELLED | OUTPATIENT
Start: 2019-02-21

## 2019-02-21 RX ORDER — SODIUM CHLORIDE, SODIUM LACTATE, POTASSIUM CHLORIDE, CALCIUM CHLORIDE 600; 310; 30; 20 MG/100ML; MG/100ML; MG/100ML; MG/100ML
100 INJECTION, SOLUTION INTRAVENOUS CONTINUOUS
Status: CANCELLED | OUTPATIENT
Start: 2019-02-21

## 2019-02-21 RX ORDER — SODIUM CHLORIDE 0.9 % (FLUSH) 0.9 %
5-40 SYRINGE (ML) INJECTION EVERY 8 HOURS
Status: CANCELLED | OUTPATIENT
Start: 2019-02-21

## 2019-02-21 NOTE — ANESTHESIA PREPROCEDURE EVALUATION
Anesthetic History Review of Systems / Medical History Patient summary reviewed, nursing notes reviewed and pertinent labs reviewed Pulmonary Sleep apnea: CPAP Neuro/Psych Cardiovascular Exercise tolerance: >4 METS Comments: TTE 1/5/19:  LVEF >65%. Mild MR, TR  
GI/Hepatic/Renal 
  
GERD 
 
 
PUD (remote hx) Comments: Esophageal stricture Endo/Other Arthritis (rheumatoid) Other Findings Comments: Acute blood loss anemia Physical Exam 
 
Airway Mallampati: I 
TM Distance: 4 - 6 cm Neck ROM: normal range of motion Mouth opening: Normal 
 
 Cardiovascular Regular rate and rhythm,  S1 and S2 normal,  no murmur, click, rub, or gallop Dental 
 
Dentition: Full lower dentures and Full upper dentures Pulmonary Breath sounds clear to auscultation Abdominal 
GI exam deferred Other Findings Anesthetic Plan ASA: 2 Anesthesia type: total IV anesthesia Anesthetic plan and risks discussed with: Patient and Spouse

## 2019-02-22 ENCOUNTER — ANESTHESIA (OUTPATIENT)
Dept: ENDOSCOPY | Age: 74
End: 2019-02-22
Payer: MEDICARE

## 2019-02-22 ENCOUNTER — HOSPITAL ENCOUNTER (OUTPATIENT)
Age: 74
Setting detail: OUTPATIENT SURGERY
Discharge: HOME | End: 2019-02-22
Attending: INTERNAL MEDICINE | Admitting: INTERNAL MEDICINE
Payer: MEDICARE

## 2019-02-22 VITALS
TEMPERATURE: 98.6 F | DIASTOLIC BLOOD PRESSURE: 74 MMHG | HEART RATE: 66 BPM | RESPIRATION RATE: 15 BRPM | WEIGHT: 123 LBS | SYSTOLIC BLOOD PRESSURE: 165 MMHG | BODY MASS INDEX: 22.63 KG/M2 | HEIGHT: 62 IN | OXYGEN SATURATION: 100 %

## 2019-02-22 LAB
ALBUMIN SERPL-MCNC: 2.7 G/DL (ref 3.2–4.6)
ALBUMIN/GLOB SERPL: 0.7 {RATIO} (ref 1.2–3.5)
ALP SERPL-CCNC: 91 U/L (ref 50–136)
ALT SERPL-CCNC: 25 U/L (ref 12–65)
AST SERPL-CCNC: 31 U/L (ref 15–37)
BASOPHILS # BLD: 0 K/UL (ref 0–0.2)
BASOPHILS NFR BLD: 1 % (ref 0–2)
BILIRUB DIRECT SERPL-MCNC: 0.2 MG/DL
BILIRUB SERPL-MCNC: 0.6 MG/DL (ref 0.2–1.1)
CREAT SERPL-MCNC: 1.05 MG/DL (ref 0.6–1)
DIFFERENTIAL METHOD BLD: ABNORMAL
EOSINOPHIL # BLD: 0.1 K/UL (ref 0–0.8)
EOSINOPHIL NFR BLD: 3 % (ref 0.5–7.8)
ERYTHROCYTE [DISTWIDTH] IN BLOOD BY AUTOMATED COUNT: 16.1 % (ref 11.9–14.6)
GLOBULIN SER CALC-MCNC: 3.8 G/DL (ref 2.3–3.5)
GRAM STN SPEC: NORMAL
HCT VFR BLD AUTO: 37.1 % (ref 35.8–46.3)
HGB BLD-MCNC: 11 G/DL (ref 11.7–15.4)
IMM GRANULOCYTES # BLD AUTO: 0 K/UL (ref 0–0.5)
IMM GRANULOCYTES NFR BLD AUTO: 0 % (ref 0–5)
INR PPP: 1
LYMPHOCYTES # BLD: 0.6 K/UL (ref 0.5–4.6)
LYMPHOCYTES NFR BLD: 16 % (ref 13–44)
MCH RBC QN AUTO: 29.8 PG (ref 26.1–32.9)
MCHC RBC AUTO-ENTMCNC: 29.6 G/DL (ref 31.4–35)
MCV RBC AUTO: 100.5 FL (ref 79.6–97.8)
MONOCYTES # BLD: 0.3 K/UL (ref 0.1–1.3)
MONOCYTES NFR BLD: 8 % (ref 4–12)
NEUTS SEG # BLD: 2.4 K/UL (ref 1.7–8.2)
NEUTS SEG NFR BLD: 72 % (ref 43–78)
NRBC # BLD: 0 K/UL (ref 0–0.2)
PLATELET # BLD AUTO: 101 K/UL (ref 150–450)
PMV BLD AUTO: 12 FL (ref 9.4–12.3)
PROT SERPL-MCNC: 6.5 G/DL (ref 6.3–8.2)
PROTHROMBIN TIME: 13.4 SEC (ref 11.7–14.5)
RBC # BLD AUTO: 3.69 M/UL (ref 4.05–5.2)
SERVICE CMNT-IMP: NORMAL
T4 FREE SERPL-MCNC: 1.4 NG/DL (ref 0.78–1.46)
TSH SERPL DL<=0.005 MIU/L-ACNC: 1.41 UIU/ML (ref 0.36–3.74)
WBC # BLD AUTO: 3.4 K/UL (ref 4.3–11.1)

## 2019-02-22 PROCEDURE — 84439 ASSAY OF FREE THYROXINE: CPT

## 2019-02-22 PROCEDURE — 88312 SPECIAL STAINS GROUP 1: CPT

## 2019-02-22 PROCEDURE — 74011250636 HC RX REV CODE- 250/636: Performed by: INTERNAL MEDICINE

## 2019-02-22 PROCEDURE — 74011250636 HC RX REV CODE- 250/636

## 2019-02-22 PROCEDURE — 85610 PROTHROMBIN TIME: CPT

## 2019-02-22 PROCEDURE — 36415 COLL VENOUS BLD VENIPUNCTURE: CPT

## 2019-02-22 PROCEDURE — 80076 HEPATIC FUNCTION PANEL: CPT

## 2019-02-22 PROCEDURE — 76060000031 HC ANESTHESIA FIRST 0.5 HR: Performed by: INTERNAL MEDICINE

## 2019-02-22 PROCEDURE — 82565 ASSAY OF CREATININE: CPT

## 2019-02-22 PROCEDURE — 88305 TISSUE EXAM BY PATHOLOGIST: CPT

## 2019-02-22 PROCEDURE — 77030009426 HC FCPS BIOP ENDOSC BSC -B: Performed by: INTERNAL MEDICINE

## 2019-02-22 PROCEDURE — 84443 ASSAY THYROID STIM HORMONE: CPT

## 2019-02-22 PROCEDURE — 76040000025: Performed by: INTERNAL MEDICINE

## 2019-02-22 PROCEDURE — 84481 FREE ASSAY (FT-3): CPT

## 2019-02-22 PROCEDURE — 85025 COMPLETE CBC W/AUTO DIFF WBC: CPT

## 2019-02-22 RX ORDER — PROPOFOL 10 MG/ML
INJECTION, EMULSION INTRAVENOUS
Status: DISCONTINUED | OUTPATIENT
Start: 2019-02-22 | End: 2019-02-22 | Stop reason: HOSPADM

## 2019-02-22 RX ORDER — SODIUM CHLORIDE, SODIUM LACTATE, POTASSIUM CHLORIDE, CALCIUM CHLORIDE 600; 310; 30; 20 MG/100ML; MG/100ML; MG/100ML; MG/100ML
1000 INJECTION, SOLUTION INTRAVENOUS CONTINUOUS
Status: DISCONTINUED | OUTPATIENT
Start: 2019-02-22 | End: 2019-02-22 | Stop reason: HOSPADM

## 2019-02-22 RX ORDER — PROPOFOL 10 MG/ML
INJECTION, EMULSION INTRAVENOUS AS NEEDED
Status: DISCONTINUED | OUTPATIENT
Start: 2019-02-22 | End: 2019-02-22 | Stop reason: HOSPADM

## 2019-02-22 RX ADMIN — SODIUM CHLORIDE, SODIUM LACTATE, POTASSIUM CHLORIDE, AND CALCIUM CHLORIDE 1000 ML: 600; 310; 30; 20 INJECTION, SOLUTION INTRAVENOUS at 09:05

## 2019-02-22 RX ADMIN — PROPOFOL 30 MG: 10 INJECTION, EMULSION INTRAVENOUS at 09:38

## 2019-02-22 RX ADMIN — PROPOFOL 20 MG: 10 INJECTION, EMULSION INTRAVENOUS at 09:31

## 2019-02-22 RX ADMIN — SODIUM CHLORIDE, SODIUM LACTATE, POTASSIUM CHLORIDE, AND CALCIUM CHLORIDE: 600; 310; 30; 20 INJECTION, SOLUTION INTRAVENOUS at 09:21

## 2019-02-22 RX ADMIN — PROPOFOL 160 MCG/KG/MIN: 10 INJECTION, EMULSION INTRAVENOUS at 09:31

## 2019-02-22 NOTE — H&P
Gastroenterology Outpatient History and Physical 
 
Patient: Jina Ruvalcaba Physician: Tereza Sol MD 
 
Vital Signs: There were no vitals taken for this visit. Allergies: Allergies Allergen Reactions  Bee Sting [Sting, Bee] Anaphylaxis  Benadryl [Diphenhydramine Hcl] Seizures  Lidocaine Other (comments)  
  jittery and lost voice; trembling; stuttering  Amoxicillin Other (comments)  
  stuttering  Clinoril [Sulindac] Hives  Lipitor [Atorvastatin] Myalgia  Lortab [Hydrocodone-Acetaminophen] Other (comments) affected speech  Lotabs Other (comments) Effects speech  Methotrexate Other (comments)  
  messed up immune system  Remicade [Infliximab] Other (comments)  
  messed up immune system  Travatan [Travoprost (Benzalkonium)] Other (comments) elevated BP Chief Complaint: dysphagia, esophageal stricture History of Present Illness: 75 yr old female with multiple medical problems here for repeat EGD with dilation. Last EGD 11/18: FINDINGS: 
ESOPHAGUS:  Upon entry into the esophagus at about 15cm from entry there was evidence of prior seen esophageal stricture. This area was tortuous but able to be traversed today with EGD scope with minimal trauma. A 12 mm Savary dilator was passed in the usual fashion with mild resistance and small amount of blood was noted on the the dilators. This was followed by a 14mm savary with moderate resistance. Repeat endoscopy after dilation revealed Moderate rent and self-limited heme noted from about 15 -18 cms from the incisors indicating successful dilation.  
STOMACH: Mild antral gastritis DUODENUM: Normal  
 
Justification for Procedure: egd with dilation for esophageal stricture History: 
Past Medical History:  
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   
 cpap Past Surgical History:  
Procedure Laterality Date  HX BREAST BIOPSY Right 10/30/2017  HX CATARACT REMOVAL    
 HX COLONOSCOPY  03/15/2017 Dr Celia Salazar  HX HEENT    
 thyroid surgery [place]  HX HYSTERECTOMY  1980  
 secondary to bleeding; fibroids  HX THYROIDECTOMY  06/2005 Dr. Frannie Dove  [place] ESOPHGL BALO DISTENSION DX STD W/PROVOCATION    
 11/12/18 Social History Socioeconomic History  Marital status:  Spouse name: Not on file  Number of children: Not on file  Years of education: Not on file  Highest education level: Not on file Tobacco Use  Smoking status: Never Smoker  Smokeless tobacco: Never Used Substance and Sexual Activity  Alcohol use: No  
 Drug use: No  
  
Family History Problem Relation Age of Onset  Heart Attack Father  Heart Disease Father  Kidney Disease Mother Medications:  
Prior to Admission medications Medication Sig Start Date End Date Taking? Authorizing Provider  
levothyroxine (SYNTHROID) 100 mcg tablet Take 1 Tab by mouth Daily (before breakfast). 1/15/19   Coby Costello MD  
sucralfate (CARAFATE) 1 gram tablet Take 1 g by mouth four (4) times daily. Provider, Historical  
levothyroxine (SYNTHROID) 88 mcg tablet Take 1 Tab by mouth Daily (before breakfast). 1/14/19   Coby Costello MD  
cyanocobalamin (VITAMIN B12) 1,000 mcg/mL injection 1 mL by IntraMUSCular route every thirty (30) days. 1/14/19   Coby Costello MD  
predniSONE (DELTASONE) 5 mg tablet TAKE 1 TABLET BY MOUTH daily 1/6/17   Provider, Historical  
ABATACEPT/MALTOSE (ORENCIA IV) by IntraVENous route every thirty (30) days. Provider, Historical  
 
 
Physical Exam:  
General: no distress HEENT: Head: Normocephalic, no lesions, without obvious abnormality.   
Heart: regular rate and rhythm, S1, S2 normal, no murmur, click, rub or gallop Lungs: chest clear, no wheezing, rales, normal symmetric air entry Abdominal: Bowel sounds are normal, liver is not enlarged, spleen is not enlarged Neurological: Grossly normal  
Extremities: extremities normal, atraumatic, no cyanosis or edema Findings/Diagnosis: esophageal stricture Plan of Care/Planned Procedure: egd with dilation Signed By: Felipe Jay MD   
 February 22, 2019

## 2019-02-22 NOTE — ROUTINE PROCESS
VSS. Discharge instructions reviewed with patient and nephew and copy of instructions sent home with patient. Dr. Nicolette Romero spoke with patient and nephew prior to discharge. Questions answered. Discharged via private car, wheeled out by myself. IV discontinued prior to discharge.   
 
Personal items with patient at discharge: Kristy Oropeza

## 2019-02-22 NOTE — DISCHARGE INSTRUCTIONS
Gastrointestinal Esophagogastroduodenoscopy (EGD) - Upper Exam Discharge Instructions    1. Call Dr. Jina Vicente for any problems or questions. 2. Contact the doctor's office for follow up appointment as directed. 3. Medication may cause drowsiness for several hours, therefore, do not drive or operate machinery for remainder of the day. 4. No alcohol today. 5. Ordinarily, you may resume regular diet and activity after exam unless otherwise specified by your physician. 6. For mild soreness in your throat you may use Cepacol throat lozenges or warm salt-water gargles as needed. Any additional instructions: Follow up with Dr. Jina Vicente in the office in 2 weeks. You have been placed on 2 new medications by Dr. Jina Vicente. Please have them filled and take them as prescribed.

## 2019-02-22 NOTE — ANESTHESIA POSTPROCEDURE EVALUATION
Procedure(s): ESOPHAGOGASTRODUODENOSCOPY (EGD)/ 20 
ENDOSCOPIC BRUSHING 
ESOPHAGOGASTRODUODENAL (EGD) BIOPSY. Anesthesia Post Evaluation Patient location during evaluation: PACU Patient participation: complete - patient participated Level of consciousness: responsive to verbal stimuli Pain management: adequate Airway patency: patent Anesthetic complications: no 
Cardiovascular status: acceptable Respiratory status: acceptable Hydration status: euvolemic Visit Vitals /77 Pulse 64 Temp 37 °C (98.6 °F) Resp 15 Ht 5' 2\" (1.575 m) Wt 55.8 kg (123 lb) SpO2 100% BMI 22.50 kg/m²

## 2019-02-22 NOTE — PROCEDURES
Esophagogastroduodenoscopy    DATE of PROCEDURE: 2/22/2019    MEDICATIONS ADMINISTERED: MAC    INSTRUMENT: GIF    PROCEDURE:  After obtaining informed consent, the patient was placed in the left lateral position and sedated. The endoscope was advanced under direct vision without difficulty. The esophagus, stomach (including retroflexed views) and duodenum were evaluated. The patient was taken to the recovery area in stable condition. FINDINGS:  ESOPHAGUS:  There is a stricture in the upper esophagus at 15cm that was dilated with the scope gently. Immediately distal there was diffuse esophagitis resembling candida esophagitis which patient has had in the past.  This was brushed and sent for pathology. No varices noted in the distal esophagus  STOMACH:  In the proximal body there was a linear ulcer alone with heaped up surrounding mucosa - this was biopsied. Additionally in the fundus and cardia there appeared to be the beginning of what could be gastric varices. Patient had easy bleeding with minimal scope trauma and all the bleeding was suctioned and irrigated - all bleed stopped prior to withdrawal of scope but there is concern for significant coagulopathy. Additional dilation not performed secondary to multiple other pathology noted today as well as overall improvement in dysphagia  DUODENUM: Normal    Estimated blood loss: 0-minimal     PLAN:  1. OV 2 wks  2. Labs today (CBC, CMP, PT, INR, TSH (last tSH was noted to be 200 range. .. In January)  3. PPI 40mg daily  4. Nadolol 40mg once daily  5.  Attempt to decrease prednisone given hx of ulcers    Carmelo Yo MD  [facility], [place]

## 2019-02-23 LAB — T3FREE SERPL-MCNC: 2.4 PG/ML (ref 2–4.4)

## 2019-12-09 ENCOUNTER — HOSPITAL ENCOUNTER (OUTPATIENT)
Dept: MAMMOGRAPHY | Age: 74
Discharge: HOME | End: 2019-12-09
Attending: FAMILY MEDICINE
Payer: MEDICARE

## 2019-12-09 DIAGNOSIS — Z12.39 SCREENING FOR MALIGNANT NEOPLASM OF BREAST: ICD-10-CM

## 2019-12-09 PROCEDURE — 77067 SCR MAMMO BI INCL CAD: CPT

## 2020-08-10 LAB
ALBUMIN SERPL-MCNC: 3.2 G/DL (ref 3.2–4.6)
ALBUMIN/GLOB SERPL: 0.8 {RATIO} (ref 1.2–3.5)
ALP SERPL-CCNC: 108 U/L (ref 50–136)
ALT SERPL-CCNC: 36 U/L (ref 12–65)
ANION GAP SERPL CALC-SCNC: 7 MMOL/L (ref 7–16)
AST SERPL-CCNC: 35 U/L (ref 15–37)
BASOPHILS # BLD: 0 K/UL (ref 0–0.2)
BASOPHILS NFR BLD: 0 % (ref 0–2)
BILIRUB SERPL-MCNC: 0.7 MG/DL (ref 0.2–1.1)
BUN SERPL-MCNC: 27 MG/DL (ref 8–23)
CALCIUM SERPL-MCNC: 9.3 MG/DL (ref 8.3–10.4)
CHLORIDE SERPL-SCNC: 109 MMOL/L (ref 98–107)
CO2 SERPL-SCNC: 25 MMOL/L (ref 21–32)
CREAT SERPL-MCNC: 1.44 MG/DL (ref 0.6–1)
DIFFERENTIAL METHOD BLD: ABNORMAL
EOSINOPHIL # BLD: 0.1 K/UL (ref 0–0.8)
EOSINOPHIL NFR BLD: 2 % (ref 0.5–7.8)
ERYTHROCYTE [DISTWIDTH] IN BLOOD BY AUTOMATED COUNT: 14.4 % (ref 11.9–14.6)
GLOBULIN SER CALC-MCNC: 4.2 G/DL (ref 2.3–3.5)
GLUCOSE SERPL-MCNC: 129 MG/DL (ref 65–100)
HCT VFR BLD AUTO: 41.9 % (ref 35.8–46.3)
HGB BLD-MCNC: 12.7 G/DL (ref 11.7–15.4)
IMM GRANULOCYTES # BLD AUTO: 0 K/UL (ref 0–0.5)
IMM GRANULOCYTES NFR BLD AUTO: 1 % (ref 0–5)
LYMPHOCYTES # BLD: 0.7 K/UL (ref 0.5–4.6)
LYMPHOCYTES NFR BLD: 12 % (ref 13–44)
MCH RBC QN AUTO: 29.8 PG (ref 26.1–32.9)
MCHC RBC AUTO-ENTMCNC: 30.3 G/DL (ref 31.4–35)
MCV RBC AUTO: 98.4 FL (ref 79.6–97.8)
MONOCYTES # BLD: 0.4 K/UL (ref 0.1–1.3)
MONOCYTES NFR BLD: 8 % (ref 4–12)
NEUTS SEG # BLD: 4.3 K/UL (ref 1.7–8.2)
NEUTS SEG NFR BLD: 78 % (ref 43–78)
NRBC # BLD: 0 K/UL (ref 0–0.2)
PLATELET # BLD AUTO: 143 K/UL (ref 150–450)
PMV BLD AUTO: 11.6 FL (ref 9.4–12.3)
POTASSIUM SERPL-SCNC: 4.1 MMOL/L (ref 3.5–5.1)
PROT SERPL-MCNC: 7.4 G/DL (ref 6.3–8.2)
RBC # BLD AUTO: 4.26 M/UL (ref 4.05–5.2)
SODIUM SERPL-SCNC: 141 MMOL/L (ref 136–145)
TSH SERPL DL<=0.005 MIU/L-ACNC: 0.62 UIU/ML (ref 0.36–3.74)
WBC # BLD AUTO: 5.5 K/UL (ref 4.3–11.1)

## 2020-08-10 PROCEDURE — 85025 COMPLETE CBC W/AUTO DIFF WBC: CPT

## 2020-08-10 PROCEDURE — 80053 COMPREHEN METABOLIC PANEL: CPT

## 2020-08-10 PROCEDURE — 84443 ASSAY THYROID STIM HORMONE: CPT

## 2020-08-10 PROCEDURE — 99284 EMERGENCY DEPT VISIT MOD MDM: CPT

## 2020-08-10 RX ORDER — ONDANSETRON 4 MG/1
4 TABLET, ORALLY DISINTEGRATING ORAL
Status: COMPLETED | OUTPATIENT
Start: 2020-08-10 | End: 2020-08-11

## 2020-08-11 ENCOUNTER — HOSPITAL ENCOUNTER (EMERGENCY)
Age: 75
Discharge: HOME | End: 2020-08-11
Attending: EMERGENCY MEDICINE
Payer: MEDICARE

## 2020-08-11 ENCOUNTER — APPOINTMENT (OUTPATIENT)
Dept: GENERAL RADIOLOGY | Age: 75
End: 2020-08-11
Attending: EMERGENCY MEDICINE
Payer: MEDICARE

## 2020-08-11 VITALS
BODY MASS INDEX: 24.84 KG/M2 | HEART RATE: 97 BPM | DIASTOLIC BLOOD PRESSURE: 72 MMHG | HEIGHT: 62 IN | TEMPERATURE: 98.5 F | WEIGHT: 135 LBS | RESPIRATION RATE: 16 BRPM | SYSTOLIC BLOOD PRESSURE: 162 MMHG | OXYGEN SATURATION: 97 %

## 2020-08-11 DIAGNOSIS — K22.2 ESOPHAGEAL STRICTURE: Primary | ICD-10-CM

## 2020-08-11 PROCEDURE — 71046 X-RAY EXAM CHEST 2 VIEWS: CPT

## 2020-08-11 PROCEDURE — 74011250637 HC RX REV CODE- 250/637: Performed by: EMERGENCY MEDICINE

## 2020-08-11 RX ADMIN — ONDANSETRON 4 MG: 4 TABLET, ORALLY DISINTEGRATING ORAL at 00:20

## 2020-08-11 NOTE — ED PROVIDER NOTES
19-year-old female presents with complaints of difficulty swallowing solid food  History of prior esophageal stricture with dilatations  Followed by   Patient has been able to tolerate a soft diet today. She is currently tolerating her secretions without difficulty  No recent fever or diarrhea    The history is provided by the patient. Foreign Body Swallowed   The current episode started more than 2 days ago. Intake: Patient reports difficulty swallowing solid foods. Suspected object: Patient was able to tolerate scrambled eggs this morning at breakfast. The incident was reported. The incident was witnessed/reported by the patient. Associated symptoms include sore throat and trouble swallowing. Pertinent negatives include no fever, no congestion, no drainage, no drooling, no difficulty breathing, no cough, no wheezing, no chest pain, no vomiting and no abdominal pain.         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 Ted Ellis    secondary to bleeding; fibroids    HX THYROIDECTOMY  06/2005    Dr. Sally Whitt DX STD W/PROVOCATION      11/12/18         Family History:   Problem Relation Age of Onset    Heart Attack Father     Heart Disease Father     Kidney Disease Mother     Breast Cancer Neg Hx        Social History     Socioeconomic History    Marital status:      Spouse name: Not on file    Number of children: Not on file    Years of education: Not on file    Highest education level: Not on file   Occupational History    Not on file   Social Needs    Financial resource strain: Not on file    Food insecurity     Worry: Not on file     Inability: Not on file    Transportation needs     Medical: Not on file     Non-medical: Not on file   Tobacco Use    Smoking status: Never Smoker    Smokeless tobacco: Never Used   Substance and Sexual Activity    Alcohol use: No    Drug use: No    Sexual activity: Not on file   Lifestyle    Physical activity     Days per week: Not on file     Minutes per session: Not on file    Stress: Not on file   Relationships    Social connections     Talks on phone: Not on file     Gets together: Not on file     Attends Che[employer] service: Not on file     Active member of club or organization: Not on file     Attends meetings of clubs or organizations: Not on file     Relationship status: Not on file    Intimate partner violence     Fear of current or ex partner: Not on file     Emotionally abused: Not on file     Physically abused: Not on file     Forced sexual activity: Not on file   Other Topics Concern    Not on file   Social History Narrative    Not on file         ALLERGIES: Bee sting [sting, bee]; Benadryl [diphenhydramine hcl]; Lidocaine; Amoxicillin; Clinoril [sulindac]; Famotidine; Lipitor [atorvastatin]; Lortab [hydrocodone-acetaminophen]; Lotabs; Methotrexate; Omeprazole; Remicade [infliximab]; and Travatan [travoprost (benzalkonium)]    Review of Systems   Constitutional: Negative for chills and fever. HENT: Positive for sore throat and trouble swallowing. Negative for congestion, drooling, ear pain and rhinorrhea. Eyes: Negative for photophobia and discharge. Respiratory: Negative for cough, shortness of breath and wheezing. Cardiovascular: Negative for chest pain and palpitations. Gastrointestinal: Negative for abdominal pain, constipation, diarrhea and vomiting.    Endocrine: Negative for cold intolerance and heat intolerance. Genitourinary: Negative for dysuria and flank pain. Musculoskeletal: Negative for arthralgias, myalgias and neck pain. Skin: Negative for rash and wound. Allergic/Immunologic: Negative for environmental allergies and food allergies. Neurological: Negative for syncope and headaches. Hematological: Negative for adenopathy. Does not bruise/bleed easily. Psychiatric/Behavioral: Positive for dysphoric mood. The patient is not nervous/anxious. All other systems reviewed and are negative. Vitals:    08/10/20 2211   BP: 181/74   Pulse: 67   Resp: 16   Temp: 98.7 °F (37.1 °C)   SpO2: 99%   Weight: 61.2 kg (135 lb)   Height: 5' 2\" (1.575 m)            Physical Exam  Vitals signs and nursing note reviewed. Constitutional:       General: She is in acute distress. Appearance: Normal appearance. She is well-developed and normal weight. HENT:      Head: Normocephalic and atraumatic. Right Ear: External ear normal.      Left Ear: External ear normal.      Nose: Nose normal.      Mouth/Throat:      Mouth: Mucous membranes are moist.      Pharynx: Oropharynx is clear. No oropharyngeal exudate. Eyes:      Extraocular Movements: Extraocular movements intact. Conjunctiva/sclera: Conjunctivae normal.      Pupils: Pupils are equal, round, and reactive to light. Neck:      Musculoskeletal: Normal range of motion and neck supple. Vascular: No JVD. Cardiovascular:      Rate and Rhythm: Normal rate and regular rhythm. Heart sounds: Normal heart sounds. No murmur. No friction rub. No gallop. Pulmonary:      Effort: Pulmonary effort is normal.      Breath sounds: Normal breath sounds. Abdominal:      General: Bowel sounds are normal. There is no distension. Palpations: Abdomen is soft. There is no mass. Tenderness: There is no abdominal tenderness. Musculoskeletal: Normal range of motion. General: No deformity.    Skin: General: Skin is warm and dry. Capillary Refill: Capillary refill takes less than 2 seconds. Findings: No rash. Neurological:      General: No focal deficit present. Mental Status: She is alert and oriented to person, place, and time. Cranial Nerves: No cranial nerve deficit. Sensory: No sensory deficit. Gait: Gait normal.   Psychiatric:         Mood and Affect: Affect is blunt and flat. Speech: Speech normal.         Behavior: Behavior normal. Behavior is cooperative. Thought Content: Thought content normal.         Judgment: Judgment normal.          MDM  Number of Diagnoses or Management Options  Esophageal stricture: established and worsening  Diagnosis management comments: 12:27 AM  Case reviewed with GI on-call, Dr. Heaven Pichardo  Will schedule patient into the office in 1 to 2 days for an urgent work in visit    Chest x-ray reviewed  No acute cardiopulmonary disease       Amount and/or Complexity of Data Reviewed  Clinical lab tests: reviewed and ordered  Tests in the radiology section of CPT®: ordered and reviewed  Tests in the medicine section of CPT®: reviewed and ordered  Decide to obtain previous medical records or to obtain history from someone other than the patient: yes  Review and summarize past medical records: yes  Independent visualization of images, tracings, or specimens: yes    Risk of Complications, Morbidity, and/or Mortality  Presenting problems: moderate  Diagnostic procedures: moderate  Management options: low  General comments: Elements of this note have been dictated via voice recognition software. Text and phrases may be limited by the accuracy of the software. The chart has been reviewed, but errors may still be present.       Patient Progress  Patient progress: stable         Procedures

## 2020-08-11 NOTE — DISCHARGE INSTRUCTIONS
The gastroenterology office will call you this morning to set up a follow-up appointment  Stay on a liquid diet  Monitor for fever  Return to ER for any worsening symptoms or new problems which may arise

## 2020-08-11 NOTE — ED TRIAGE NOTES
Arrives with face mask in place. Reports \"something stuck in my throat\". Onset 3 days pta. Denies choking at time of onset. Hx esophageal dilation, scheduled appt 8/20. Hx thyroid problems, states told thyroid \"normal\" with follow up recently. States difficulty passing solid foods over last 3 days, able to tolerate eggs this AM. Denies shortness of breath. Sats 100% RA on arrival. Speaking in full sentences. Tolerating oral secretions. States able to pass liquids.

## 2020-08-11 NOTE — ED NOTES
I have reviewed discharge instructions with the patient. The patient verbalized understanding. Patient left ED via Discharge Method: wheelchair to Home with family    Opportunity for questions and clarification provided. Patient given 0 scripts. To continue your aftercare when you leave the hospital, you may receive an automated call from our care team to check in on how you are doing. This is a free service and part of our promise to provide the best care and service to meet your aftercare needs.  If you have questions, or wish to unsubscribe from this service please call [phone]. Thank you for Choosing our [facility] Emergency Department.

## 2020-08-21 NOTE — H&P
>      Patient:   Judy Gould  YOB: 1945   Date:                        08/11/2020 2:00 PM   Visit Type:                  Office Visit  Provider:   BRITTNEY Perez  Referring Provider:  Karina Concepcion MD [facility]  Primary Care Provider: Karina Concepcion MD [address] [place] [address]    This 75year old  patient was referred by Karina Concepcion MD.  This 75year old female presents for Dysphagia and GERD. History of Present Illness:  1. Dysphagia   2. GERD   Work-in:  Dr Corey Bass    75year-old patient of Dr. Sonya Rogers with a past medical history of hyperlipidemia, gall stones, thyroid disease, rheumatoid arthritis, sleep apnea, PUD and IBS presenting with a chief complaint of dysphagia. She reports increasing solid food dysphagia beginning on Saturday. She denies dysphagia to liquids. She feels like famotidine 40 mg once daily improved reflux but does not completely control reflux. She reports an allergy to omeprazole but does not recall what happens when she takes this. She denies over-the-counter NSAIDs but has started back taking Arthrotec for arthritis. She denies nausea, vomiting or melena. She denies significant change in bowel habits, diarrhea, constipation, rectal bleed or unintended weight loss. EGD for dysphagia 7/30/18 with stricture at 12 cm. Stricture was visualized and appeared to be surrounding diverticulum. Scope not be passed. EGD 8/1/18 with marked esophageal narrowing and angulation. Mid esophagus with mild mucosal trauma after dilation. Distal esophagus normal.     EGD 2/22/19 with stricture in the upper esophagus at 15 cm that was dilated with the scope gently. Diffuse esophagitis resembling Candida esophagitis. Linear old sure in stomach.        Past Medical/Surgical History:   (Detailed)  Disease/disorder Onset Date Management Date Comments            partial thyroidectomy  [facility] 07/16/2018 -     LUCINDA/BSO  [facility] 07/16/2018 - Dyslipidemia       gallstones per u/s 2017    [facility] 2018 -   h/o kidney stones  stent/lithotripsy  [facility] 2018 -   Hypothyroidism, primary       mild thrombocytopenia    [facility] 2018 -   Rheumatoid arthritis       sleep apnea  no cpap     small, mildly hyperechoic liver per u/s     [facility] 2018 -also elevated resistive index which could be secondary to portal hypertension     Additional Medical History  Rheumatoid arthritis- chronic prednisone, arthrotec,   Vasculitis  Hypothyroidism  Dyslipidemia  Microcytic anemia - pernicious anemia on B 12 replacement, friable mucosa on EGD in   IBS  ANTONIO    Additional Surgical History  Abdominal hysterectomy and bilateral oophorectomy  PARTIAL Thyroidectomy in 2005  left cataract surgery    Procedure History  Test Date Results Interp   EGD 2018 Esophageal stricture    EGD 2017 Esophageal stricture, Esophagitis, Hiatal hernia, Esophageal diverticulum    COLONOSCOPY 03/15/2017 Tortuous colon, Hemorrhoid - external w/out complications, Diverticulosis, colon w/out bleeding, Diverticulitis, colon w/out bleeding, Benign neoplasm of the ascending colon (D12.2), Benign neoplasm of the cecum    EGD 03/15/2017 Esophageal stricture, Esophageal diverticulum    EGD 2012 Gastritis, w/o bleeding, Gastric polyps    EGD 2011 Gastritis, w/o bleeding, Gastric polyps    EGD 2011 Gastric ulcer, unspec, Gastric polyps    COLONOSCOPY 2011 NORMAL COLO EXAM    COLONOSCOPY 2011       EGD/colo  (JS) friable mucosa, hyperplastic gastric polyps;   EGD 2011- ulcer in body of stomach with shaggy base (Dr Martin Yanes)    Family History:  (Detailed)  Relationship Family Member Name  Age at Death Condition Onset Age Cause of Death       No family history of Cancer, colon  N     Additional Family History  Mother  of renal failure at 78. Father  at 61 of an MI. No family history of colon cancer or polyps.     Social History:  (Detailed)  The patient is retired. She was a . She played organ and piano. Preferred language is [place]. MARITAL STATUS/FAMILY/SOCIAL SUPPORT  Currently . Tobacco use status: Never smoked tobacco.  Smoking status: Never smoker. ALCOHOL  There is no history of alcohol use. HOME ENVIRONMENT/SAFETY  The patient has not fallen in the last year. COMMENTS  hx of blood transfusion (15 yrs ago). No hx of tattoos, piercings, IVDA, or Hep A/B vaccinations. Current Medications:  Medication Directions   Vitamin B-12 1,000 mcg/mL injection solution inject 1 milliliter by intramuscular route  every month   famotidine 40 mg tablet take 2 tablet (40MG)  by oral route  every day in the am and at bedtime   GenTeal Gel 0.25 %-0.3 % eye liquid gel drops take 1 drop at bedtime   misoprostol 200 mcg tablet take 1 tablet by oral route 2 times every day after meals and at bedtime   Orencia (with maltose) 250 mg intravenous solution infuse (500MG)  by intravenous route  every 4 weeks over   prednisone 5 mg tablet take 1 tablet by oral route  every day   sucralfate 1 gram tablet take 1 tablet by oral route 4 times every day on an empty stomach 1 hour before meals and at bedtime   Synthroid 75 mcg tablet Alternating days   Synthroid 50 mcg tablet Alternating Days     Allergies:  Ingredient Reaction (Severity) Medication Name Comment   ACETAMINOPHEN LOSS OF SPEECH Lortab    AMOXICILLIN speech prob     ASPIRIN Unknown  ASPIRIN   BENZALKONIUM CHLORIDE  Travatan (with benzalkonium)    DIPHENHYDRAMINE HCL  Benadryl    HYDROCODONE BITARTRATE loss of speech Lortab    INFLIXIMAB  Remicade    LIDOCAINE loss voice (severe)     METHOTREXATE ANALOGUES   METHOTREXATE DERIVATIVES. METRONIDAZOLE      OMEPRAZOLE      SULINDAC   CLINORIL. TRAVOPROST  Travatan (with benzalkonium)    Reviewed, updated. Review of Systems:  System Neg/Pos Details   Eyes Positive Vision changes. GI Positive Dysphagia, Reflux. Endocrine Positive Cold intolerance. MS Positive Joint pain. All other review of systems are negative. Vital Signs:  BP mm/Hg Pulse Resp Pulse Ox Temp F Ht (Total in.) Weight (lbs.) Weight (oz.) BMI   126/72 66 16  98.60 62.00 137.80  25.20     Physical Exam:  GENERAL: A well nourished, well hydrated, patient who appears the stated age. SKIN: Extremities and face reveal no rashes. HEENT: Sclerae anicteric. No oral ulcerations or abnormal pigment of the lips. Pupils equal and reactive to light. CARDIOVASCULAR: Regular rate and rhythm. No murmurs, gallops or rubs. RESPIRATORY: Comfortable breathing with no accessory muscle use. Clear breath sounds with no rales or wheezes. GI: The abdomen is soft, nondistended, and nontender. Normal active bowel sounds. No enlargement of the liver or spleen. No masses palpable. RECTAL: Deferred. MUSCULOSKELETAL: Gait appears steady. No pitting edema of the lower legs. The neck is supple and extremities have a good range of motion. NEUROLOGICAL: Gross memory appears intact. Patient is alert and oriented. Medications Prescribed/Renewed (this encounter):  Medication Directions   famotidine 40 mg tablet take 2 tablet (40MG)  by oral route  every day in the am and at bedtime     Assessment/Plan:  # Detail Type Description    1. Assessment Gastroesophageal reflux disease, esophagitis presence not specified (K21.9). Plan Orders She is to schedule a follow-up visit to be determined after testing/procedure. 2. Assessment Dysphagia, unspecified type (R13.10). Provider Plan 69-year-old patient with a past medical history of hyperlipidemia, gall stones, thyroid disease, rheumatoid arthritis, sleep apnea, PUD and IBS presenting with a chief complaint of dysphagia. She has a history of peptic ulcer disease and restarted Arthrotec recently. She also reports reflux though been suboptimally controlled on famotidine 40 mg daily.   ASA 3  - Increase famotidine 40 mg twice daily  - Upper endoscopy with dilation if indicated. I have discussed with the patient the technique, benefits, alternatives and risks of the procedure, including but not limited to medication reaction, bleeding or perforation of the gastrointestinal tract. Plan Orders Further evaluations ordered today include EGD W/WO Cytology to be performed, Next Lab Date is on 08/27/2020. Fall Risk Plan:  The patient has not fallen in the last year. Counseling / Educational Factors:  Items reviewed / discussed during today's visit: prior testing / procedures were reviewed; testing was discussed in detail; old records were reviewed; indications and risks of the procedure were discussed; prescription medication usage was discussed; advised to follow up if symptoms worsen or do not completely resolve; symptomatic care was discussed. Patient expressed understanding of instructions. The patient was checked out at 2:45 PM by Lizette Guzman. [facility]. Elements of this note may have been dictated using speech recognition software. As a result, errors of speech recognition may have occurred. Provider:   Merritt Fernandez 08/11/2020 4:55 PM   Document generated by: Savanah Arboleda. Wan Elena 08/11/2020    CC Providers:  Timoteo Leong MD   [facility]-      Electronically signed by Savanah Arboleda.  Maeve uA on 08/11/2020 05:21 PM

## 2020-08-26 ENCOUNTER — ANESTHESIA EVENT (OUTPATIENT)
Dept: ENDOSCOPY | Age: 75
End: 2020-08-26
Payer: MEDICARE

## 2020-08-27 ENCOUNTER — ANESTHESIA (OUTPATIENT)
Dept: ENDOSCOPY | Age: 75
End: 2020-08-27
Payer: MEDICARE

## 2020-08-27 ENCOUNTER — HOSPITAL ENCOUNTER (OUTPATIENT)
Age: 75
Setting detail: OUTPATIENT SURGERY
Discharge: HOME | End: 2020-08-27
Attending: INTERNAL MEDICINE | Admitting: INTERNAL MEDICINE
Payer: MEDICARE

## 2020-08-27 VITALS
DIASTOLIC BLOOD PRESSURE: 70 MMHG | RESPIRATION RATE: 16 BRPM | OXYGEN SATURATION: 100 % | SYSTOLIC BLOOD PRESSURE: 158 MMHG | HEIGHT: 62 IN | TEMPERATURE: 98 F | HEART RATE: 53 BPM | WEIGHT: 135 LBS | BODY MASS INDEX: 24.84 KG/M2

## 2020-08-27 PROCEDURE — 74011250636 HC RX REV CODE- 250/636: Performed by: NURSE ANESTHETIST, CERTIFIED REGISTERED

## 2020-08-27 PROCEDURE — 76060000032 HC ANESTHESIA 0.5 TO 1 HR: Performed by: INTERNAL MEDICINE

## 2020-08-27 PROCEDURE — 76040000025: Performed by: INTERNAL MEDICINE

## 2020-08-27 PROCEDURE — 74011250636 HC RX REV CODE- 250/636: Performed by: ANESTHESIOLOGY

## 2020-08-27 PROCEDURE — 74011000250 HC RX REV CODE- 250: Performed by: ANESTHESIOLOGY

## 2020-08-27 RX ORDER — FAMOTIDINE 20 MG/1
20 TABLET, FILM COATED ORAL AS NEEDED
Status: DISCONTINUED | OUTPATIENT
Start: 2020-08-27 | End: 2020-08-27 | Stop reason: HOSPADM

## 2020-08-27 RX ORDER — SODIUM CHLORIDE, SODIUM LACTATE, POTASSIUM CHLORIDE, CALCIUM CHLORIDE 600; 310; 30; 20 MG/100ML; MG/100ML; MG/100ML; MG/100ML
100 INJECTION, SOLUTION INTRAVENOUS CONTINUOUS
Status: DISCONTINUED | OUTPATIENT
Start: 2020-08-27 | End: 2020-08-27 | Stop reason: HOSPADM

## 2020-08-27 RX ORDER — SODIUM CHLORIDE, SODIUM LACTATE, POTASSIUM CHLORIDE, CALCIUM CHLORIDE 600; 310; 30; 20 MG/100ML; MG/100ML; MG/100ML; MG/100ML
INJECTION, SOLUTION INTRAVENOUS
Status: DISCONTINUED | OUTPATIENT
Start: 2020-08-27 | End: 2020-08-27 | Stop reason: HOSPADM

## 2020-08-27 RX ORDER — PROPOFOL 10 MG/ML
INJECTION, EMULSION INTRAVENOUS AS NEEDED
Status: DISCONTINUED | OUTPATIENT
Start: 2020-08-27 | End: 2020-08-27 | Stop reason: HOSPADM

## 2020-08-27 RX ADMIN — PROPOFOL 20 MG: 10 INJECTION, EMULSION INTRAVENOUS at 12:19

## 2020-08-27 RX ADMIN — PROPOFOL 20 MG: 10 INJECTION, EMULSION INTRAVENOUS at 12:17

## 2020-08-27 RX ADMIN — PROPOFOL 20 MG: 10 INJECTION, EMULSION INTRAVENOUS at 12:24

## 2020-08-27 RX ADMIN — PROPOFOL 30 MG: 10 INJECTION, EMULSION INTRAVENOUS at 12:06

## 2020-08-27 RX ADMIN — PROPOFOL 20 MG: 10 INJECTION, EMULSION INTRAVENOUS at 12:07

## 2020-08-27 RX ADMIN — PROPOFOL 20 MG: 10 INJECTION, EMULSION INTRAVENOUS at 12:21

## 2020-08-27 RX ADMIN — SODIUM CHLORIDE, SODIUM LACTATE, POTASSIUM CHLORIDE, AND CALCIUM CHLORIDE 100 ML/HR: 600; 310; 30; 20 INJECTION, SOLUTION INTRAVENOUS at 10:39

## 2020-08-27 RX ADMIN — PROPOFOL 40 MG: 10 INJECTION, EMULSION INTRAVENOUS at 12:05

## 2020-08-27 RX ADMIN — PROPOFOL 30 MG: 10 INJECTION, EMULSION INTRAVENOUS at 12:09

## 2020-08-27 RX ADMIN — PROPOFOL 20 MG: 10 INJECTION, EMULSION INTRAVENOUS at 12:15

## 2020-08-27 RX ADMIN — PROPOFOL 20 MG: 10 INJECTION, EMULSION INTRAVENOUS at 12:13

## 2020-08-27 RX ADMIN — PROPOFOL 40 MG: 10 INJECTION, EMULSION INTRAVENOUS at 12:04

## 2020-08-27 RX ADMIN — SODIUM CHLORIDE, SODIUM LACTATE, POTASSIUM CHLORIDE, AND CALCIUM CHLORIDE: 600; 310; 30; 20 INJECTION, SOLUTION INTRAVENOUS at 11:59

## 2020-08-27 RX ADMIN — PROPOFOL 20 MG: 10 INJECTION, EMULSION INTRAVENOUS at 12:11

## 2020-08-27 NOTE — PROCEDURES
Esophagogastroduodenoscopy    DATE of PROCEDURE: 2020    MEDICATIONS ADMINISTERED: MAC    INSTRUMENT: GIF changed to .    PROCEDURE:  After obtaining informed consent, the patient was placed in the left lateral position and sedated. The endoscope was advanced under direct vision without difficulty. The esophagus, stomach (including retroflexed views) and duodenum were evaluated. The patient was taken to the recovery area in stable condition. FINDINGS:  ESOPHAGUS:  Immediately upon entry into oropharynx there was large amount of mucous noted that was suctioned out. At 15 cm there was a tight stricture that would not allow passage of the EGD scope. A  scope was advanced cautiously and noted the entire esophagus filled with diffuse candida. There is also a hiatal hernia measuring 3cm. Over a guidewire a 21 and 24 Fr savary dilatore passed and repeat egd revealed heme and rent at stricture. Additional dilation on this visit not noted to be safe. STOMACH:  Mild chronic gastrits noted. DUODENUM: Normal    Estimated blood loss: 0-minimal     PLAN:  1. Liquid diet today  2. Start diflucan suspension  3.  Repeat egd in 2 months with me at [facility]    Kingsley Trujillo MD  [facility] [place]

## 2020-08-27 NOTE — INTERVAL H&P NOTE
Update History & Physical 
 
The Patient's History and Physical of August 11, 2020 was reviewed with the patient and I examined the patient. There was no change. The surgical site was confirmed by the patient and me. Plan:  The risk, benefits, expected outcome, and alternative to the recommended procedure have been discussed with the patient. Patient understands and wants to proceed with the procedure.  
 
Electronically signed by Bruno Villanueva MD on 8/27/2020 at 12:02 PM

## 2020-08-27 NOTE — ANESTHESIA POSTPROCEDURE EVALUATION
Procedure(s):  ESOPHAGOGASTRODUODENOSCOPY (EGD)/ 25  ESOPHAGEAL DILATION. total IV anesthesia    Anesthesia Post Evaluation      Multimodal analgesia: multimodal analgesia not used between 6 hours prior to anesthesia start to PACU discharge  Patient location during evaluation: bedside  Patient participation: complete - patient participated  Level of consciousness: awake and alert  Pain management: adequate  Airway patency: patent  Anesthetic complications: no  Cardiovascular status: hemodynamically stable  Respiratory status: spontaneous ventilation  Hydration status: euvolemic  Comments: Patient stable and may discharge at this time. Post anesthesia nausea and vomiting:  none  Final Post Anesthesia Temperature Assessment:  Normothermia (36.0-37.5 degrees C)      INITIAL Post-op Vital signs:   Vitals Value Taken Time   /70 8/27/2020 12:59 PM   Temp 36.7 °C (98 °F) 8/27/2020 12:30 PM   Pulse 64 8/27/2020  1:00 PM   Resp 16 8/27/2020 12:30 PM   SpO2 100 % 8/27/2020  1:00 PM   Vitals shown include unvalidated device data.

## 2020-08-27 NOTE — ANESTHESIA PREPROCEDURE EVALUATION
Anesthetic History               Review of Systems / Medical History  Patient summary reviewed, nursing notes reviewed and pertinent labs reviewed    Pulmonary        Sleep apnea: No treatment           Neuro/Psych              Cardiovascular                  Exercise tolerance: >4 METS  Comments: TTE 1/5/19:  LVEF >65%.   Mild MR, TR   GI/Hepatic/Renal     GERD      PUD (remote hx)    Comments: Esophageal stricture Endo/Other      Hypothyroidism: well controlled  Arthritis (rheumatoid)     Other Findings   Comments: Acute blood loss anemia           Physical Exam    Airway  Mallampati: I  TM Distance: 4 - 6 cm  Neck ROM: normal range of motion   Mouth opening: Normal     Cardiovascular  Regular rate and rhythm,  S1 and S2 normal,  no murmur, click, rub, or gallop             Dental    Dentition: Full lower dentures and Full upper dentures     Pulmonary  Breath sounds clear to auscultation               Abdominal  GI exam deferred       Other Findings            Anesthetic Plan    ASA: 2  Anesthesia type: total IV anesthesia          Induction: Intravenous  Anesthetic plan and risks discussed with: Patient

## 2020-08-27 NOTE — ROUTINE PROCESS
VSS. No complaints noted. Education given and reviewed with nephew. Pt wheeled out to car via wheelchair by [facility].

## 2020-08-27 NOTE — DISCHARGE INSTRUCTIONS
Gastrointestinal Esophagogastroduodenoscopy (EGD) - Upper Exam Discharge Instructions    1. Call Dr. Bettye Underwood at [phone] for any problems or questions. 2. Contact the doctor's office for follow up appointment as directed. 3. Medication may cause drowsiness for several hours, therefore:  · Do not drive or operate machinery for remainder of the day. · No alcohol today. · Do not make any important or legal decisions for 24 hours. · Do not sign any legal documents for 24 hours. 5. Soft diet today advance to regular diet tommorrow    6. For mild soreness in your throat you may use Cepacol throat lozenges or warm               salt-water gargles as needed. Any additional instructions:  ***     Instructions given to [employer] and other family members.   Instructions given by:  Tejinder Roe

## 2020-11-04 PROBLEM — N20.0 KIDNEY STONE: Status: RESOLVED | Noted: 2017-01-13 | Resolved: 2020-11-04

## 2020-11-04 PROBLEM — Z87.442 HISTORY OF KIDNEY STONES: Status: ACTIVE | Noted: 2020-11-04

## 2020-11-13 ENCOUNTER — HOSPITAL ENCOUNTER (OUTPATIENT)
Dept: MRI IMAGING | Age: 75
Discharge: HOME | End: 2020-11-13
Attending: FAMILY MEDICINE
Payer: MEDICARE

## 2020-11-13 DIAGNOSIS — M54.42 CHRONIC LEFT-SIDED LOW BACK PAIN WITH LEFT-SIDED SCIATICA: ICD-10-CM

## 2020-11-13 DIAGNOSIS — G89.29 CHRONIC LEFT-SIDED LOW BACK PAIN WITH LEFT-SIDED SCIATICA: ICD-10-CM

## 2020-11-13 PROCEDURE — 72148 MRI LUMBAR SPINE W/O DYE: CPT

## 2020-12-03 ENCOUNTER — ANESTHESIA EVENT (OUTPATIENT)
Dept: ENDOSCOPY | Age: 75
End: 2020-12-03
Payer: MEDICARE

## 2020-12-03 RX ORDER — SODIUM CHLORIDE, SODIUM LACTATE, POTASSIUM CHLORIDE, CALCIUM CHLORIDE 600; 310; 30; 20 MG/100ML; MG/100ML; MG/100ML; MG/100ML
100 INJECTION, SOLUTION INTRAVENOUS CONTINUOUS
Status: CANCELLED | OUTPATIENT
Start: 2020-12-03

## 2020-12-03 RX ORDER — SODIUM CHLORIDE 0.9 % (FLUSH) 0.9 %
5-40 SYRINGE (ML) INJECTION AS NEEDED
Status: CANCELLED | OUTPATIENT
Start: 2020-12-03

## 2020-12-03 RX ORDER — SODIUM CHLORIDE 0.9 % (FLUSH) 0.9 %
5-40 SYRINGE (ML) INJECTION EVERY 8 HOURS
Status: CANCELLED | OUTPATIENT
Start: 2020-12-03

## 2020-12-04 ENCOUNTER — HOSPITAL ENCOUNTER (OUTPATIENT)
Age: 75
Setting detail: OUTPATIENT SURGERY
Discharge: HOME | End: 2020-12-04
Attending: INTERNAL MEDICINE | Admitting: INTERNAL MEDICINE
Payer: MEDICARE

## 2020-12-04 ENCOUNTER — ANESTHESIA (OUTPATIENT)
Dept: ENDOSCOPY | Age: 75
End: 2020-12-04
Payer: MEDICARE

## 2020-12-04 VITALS
DIASTOLIC BLOOD PRESSURE: 60 MMHG | BODY MASS INDEX: 25.21 KG/M2 | OXYGEN SATURATION: 97 % | RESPIRATION RATE: 14 BRPM | SYSTOLIC BLOOD PRESSURE: 123 MMHG | HEIGHT: 62 IN | TEMPERATURE: 98 F | WEIGHT: 137 LBS | HEART RATE: 70 BPM

## 2020-12-04 PROCEDURE — 2709999900 HC NON-CHARGEABLE SUPPLY: Performed by: INTERNAL MEDICINE

## 2020-12-04 PROCEDURE — 74011250636 HC RX REV CODE- 250/636: Performed by: NURSE ANESTHETIST, CERTIFIED REGISTERED

## 2020-12-04 PROCEDURE — 76060000031 HC ANESTHESIA FIRST 0.5 HR: Performed by: INTERNAL MEDICINE

## 2020-12-04 PROCEDURE — 76040000025: Performed by: INTERNAL MEDICINE

## 2020-12-04 PROCEDURE — 74011250636 HC RX REV CODE- 250/636: Performed by: ANESTHESIOLOGY

## 2020-12-04 RX ORDER — SODIUM CHLORIDE, SODIUM LACTATE, POTASSIUM CHLORIDE, CALCIUM CHLORIDE 600; 310; 30; 20 MG/100ML; MG/100ML; MG/100ML; MG/100ML
100 INJECTION, SOLUTION INTRAVENOUS CONTINUOUS
Status: DISCONTINUED | OUTPATIENT
Start: 2020-12-04 | End: 2020-12-04 | Stop reason: HOSPADM

## 2020-12-04 RX ORDER — PROPOFOL 10 MG/ML
INJECTION, EMULSION INTRAVENOUS AS NEEDED
Status: DISCONTINUED | OUTPATIENT
Start: 2020-12-04 | End: 2020-12-04 | Stop reason: HOSPADM

## 2020-12-04 RX ADMIN — PROPOFOL 20 MG: 10 INJECTION, EMULSION INTRAVENOUS at 11:52

## 2020-12-04 RX ADMIN — PROPOFOL 30 MG: 10 INJECTION, EMULSION INTRAVENOUS at 12:01

## 2020-12-04 RX ADMIN — SODIUM CHLORIDE, SODIUM LACTATE, POTASSIUM CHLORIDE, AND CALCIUM CHLORIDE 100 ML/HR: 600; 310; 30; 20 INJECTION, SOLUTION INTRAVENOUS at 11:19

## 2020-12-04 RX ADMIN — PROPOFOL 50 MG: 10 INJECTION, EMULSION INTRAVENOUS at 11:51

## 2020-12-04 RX ADMIN — PROPOFOL 50 MG: 10 INJECTION, EMULSION INTRAVENOUS at 11:59

## 2020-12-04 RX ADMIN — PROPOFOL 50 MG: 10 INJECTION, EMULSION INTRAVENOUS at 11:56

## 2020-12-04 NOTE — H&P
Gastroenterology Outpatient History and Physical    Patient: [facility]    Physician: Caden Schmidt MD    Vital Signs: Blood pressure (!) 159/71, pulse 77, temperature 98.7 °F (37.1 °C), resp. rate 16, height 5' 2\" (1.575 m), weight 62.1 kg (137 lb), SpO2 99 %. Allergies:    Allergies   Allergen Reactions    Bee Sting [Sting, Bee] Anaphylaxis    Benadryl [Diphenhydramine Hcl] Seizures    Lidocaine Other (comments)     jittery and lost voice; trembling; stuttering    Amoxicillin Other (comments)     stuttering    Clinoril [Sulindac] Hives    Famotidine Other (comments)     Not being able to talk    Flagyl [Metronidazole] Other (comments)     Jittery; loss of voice    Lipitor [Atorvastatin] Myalgia    Lortab [Hydrocodone-Acetaminophen] Other (comments)     affected speech    Lotabs Other (comments)     Effects speech    Methotrexate Other (comments)     messed up immune system    Omeprazole Other (comments)     effects speech    Remicade [Infliximab] Other (comments)     messed up immune system    Travatan [Travoprost (Benzalkonium)] Other (comments)     elevated BP       Chief Complaint: stricture sec to dysphagia    History of Present Illness: 67 yr old female with upper esophageal stricture requiring dilations in the past here for f/up exam    Justification for Procedure: egd with dilation    History:  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 01/1/2589    GERD (gastroesophageal reflux disease)     no meds    Goiter     managed with medications    Hypercholesterolemia 10/1/2012    PUD (peptic ulcer disease) 10/1/2012    RA (rheumatoid arthritis) ([address]     Reflux 10/1/2012    Seizures ([facility] [address]     from benadryl    Sleep apnea     no CPAP      Past Surgical History:   Procedure Laterality Date    HX BREAST BIOPSY Right 10/30/2017    benign    HX CATARACT REMOVAL Left     HX COLONOSCOPY  03/15/2017    Dr Kiersten Hernandez [place]    HX HEENT      thyroid surgery [place]    HX HEENT      throat stretched Dr. Rob Edgar    secondary to bleeding; fibroids    HX THYROIDECTOMY  06/2005    Dr. Alberto Spence- partial    [place] [address] DX STD W/PROVOCATION      11/12/18      Social History     Socioeconomic History    Marital status:      Spouse name: Not on file    Number of children: Not on file    Years of education: Not on file    Highest education level: Not on file   Tobacco Use    Smoking status: Never Smoker    Smokeless tobacco: Never Used   Substance and Sexual Activity    Alcohol use: No    Drug use: No      Family History   Problem Relation Age of Onset    Heart Attack Father     Heart Disease Father     Kidney Disease Mother     Breast Cancer Neg Hx        Medications:   Prior to Admission medications    Medication Sig Start Date End Date Taking? Authorizing Provider   levothyroxine (SYNTHROID) 75 mcg tablet One po every other day alternating with the 50 mcg dose 11/4/20  Yes Bear RODRIGUEZ MD   levothyroxine (SYNTHROID) 50 mcg tablet Take 1 p.o. every other day alternating with the 75 mcg dose. 11/4/20  Yes Yadira Nguyen MD   cyanocobalamin (VITAMIN B12) 1,000 mcg/mL injection INJECT 1 ML BY INTRAMUSCULAR ROUTE EVERY THIRTY (30) DAYS. 10/14/20  Yes Yadira Nguyen MD   predniSONE (DELTASONE) 5 mg tablet 10 mg. 1/6/17  Yes Provider, Historical   ABATACEPT/MALTOSE (ORENCIA IV) by IntraVENous route every thirty (30) days. Yes Provider, Historical       Physical Exam:   General: no distress   HEENT: Head: Normocephalic, no lesions, without obvious abnormality.    Heart: regular rate and rhythm, S1, S2 normal, no murmur, click, rub or gallop   Lungs: chest clear, no wheezing, rales, normal symmetric air entry   Abdominal: Bowel sounds are normal, liver is not enlarged, spleen is not enlarged   Neurological: Grossly normal   Extremities: extremities normal, atraumatic, no cyanosis or edema Findings/Diagnosis: stricture    Plan of Care/Planned Procedure: egd with dilation

## 2020-12-04 NOTE — ANESTHESIA POSTPROCEDURE EVALUATION
Procedure(s):  ESOPHAGOGASTRODUODENOSCOPY (EGD)/ 25  ESOPHAGEAL DILATION. total IV anesthesia    Anesthesia Post Evaluation      Multimodal analgesia: multimodal analgesia used between 6 hours prior to anesthesia start to PACU discharge  Patient location during evaluation: PACU  Patient participation: complete - patient participated  Level of consciousness: awake and alert  Pain management: adequate  Airway patency: patent  Anesthetic complications: no  Cardiovascular status: acceptable and hemodynamically stable  Respiratory status: acceptable  Hydration status: acceptable  Post anesthesia nausea and vomiting:  none  Final Post Anesthesia Temperature Assessment:  Normothermia (36.0-37.5 degrees C)      INITIAL Post-op Vital signs:   Vitals Value Taken Time   /60 12/4/2020 12:51 PM   Temp 36.7 °C (98 °F) 12/4/2020 12:15 PM   Pulse 75 12/4/2020 12:54 PM   Resp 14 12/4/2020 12:22 PM   SpO2 82 % 12/4/2020 12:54 PM   Vitals shown include unvalidated device data.

## 2020-12-04 NOTE — ROUTINE PROCESS
VSS. Patient denies any complaints at this time. Discharge education provided to patient and Nephew. Patient discharged out via wheelchair with RN.

## 2020-12-04 NOTE — ANESTHESIA PREPROCEDURE EVALUATION
Anesthetic History               Review of Systems / Medical History  Patient summary reviewed, nursing notes reviewed and pertinent labs reviewed    Pulmonary        Sleep apnea: No treatment           Neuro/Psych              Cardiovascular                  Exercise tolerance: >4 METS  Comments: TTE 1/5/19:  LVEF >65%.   Mild MR, TR   GI/Hepatic/Renal     GERD      PUD (remote hx)    Comments: Esophageal stricture Endo/Other      Hypothyroidism: well controlled  Arthritis (rheumatoid)     Other Findings              Physical Exam    Airway  Mallampati: I  TM Distance: 4 - 6 cm  Neck ROM: normal range of motion   Mouth opening: Normal     Cardiovascular  Regular rate and rhythm,  S1 and S2 normal,  no murmur, click, rub, or gallop             Dental    Dentition: Edentulous     Pulmonary  Breath sounds clear to auscultation               Abdominal  GI exam deferred       Other Findings            Anesthetic Plan    ASA: 2  Anesthesia type: total IV anesthesia          Induction: Intravenous  Anesthetic plan and risks discussed with: Patient      NO LIDOCAINE

## 2020-12-04 NOTE — PROCEDURES
Esophagogastroduodenoscopy    DATE of PROCEDURE: 2020    MEDICATIONS ADMINISTERED: MAC    INSTRUMENT: GIF    PROCEDURE:  After obtaining informed consent, the patient was placed in the left lateral position and sedated. The endoscope was advanced under direct vision without difficulty. The esophagus, stomach (including retroflexed views) and duodenum were evaluated. The patient was taken to the recovery area in stable condition. FINDINGS:  ESOPHAGUS:  Immediately upon entry into oropharynx there was large amount of mucous noted that was suctioned out. At 15 cm there was a tight stricture that would not allow passage of the EGD scope. A  scope was advanced cautiously and noted the entire esophagus filled with diffuse candida. There is also a hiatal hernia measuring 3cm. Over a guidewire a 24 and 27 Fr savary dilatore passed and repeat egd revealed heme and rent at stricture. Additional dilation on this visit not noted to be safe. STOMACH:  Mild chronic gastrits noted. DUODENUM: Normal    Estimated blood loss: 0-minimal     PLAN:  1. Liqui diet x 24 hours  2. Soft diet and careful mastication thereafter  3.  Repeat egd at hospital with me in 6 months for dilation    Parvin Francis MD  [facility] [place]

## 2020-12-04 NOTE — DISCHARGE INSTRUCTIONS
Gastrointestinal Esophagogastroduodenoscopy (EGD) - Upper Exam Discharge Instructions    1. Call Dr. Mary Chandra office for any problems or questions. 2. Contact the doctor's office for follow up appointment as directed. 3. Medication may cause drowsiness for several hours, therefore:  · Do not drive or operate machinery for remainder of the day. · No alcohol today. · Do not make any important or legal decisions for 24 hours. · Do not sign any legal documents for 24 hours. 5. Ordinarily, you may resume regular diet and activity after exam unless otherwise              specified by your physician. 6. For mild soreness in your throat you may use Cepacol throat lozenges or warm               salt-water gargles as needed. Any additional instructions:    1. Liquid diet for 24 hours and then soft diet. Slowly advance as tolerated. 3. Repeat EGD at hospital in 6 months for dilation. Instructions given to [address] and other family members.

## 2020-12-14 ENCOUNTER — TRANSCRIBE ORDER (OUTPATIENT)
Dept: SCHEDULING | Age: 75
End: 2020-12-14

## 2020-12-14 DIAGNOSIS — Z12.31 SCREENING MAMMOGRAM FOR HIGH-RISK PATIENT: Primary | ICD-10-CM

## 2020-12-16 ENCOUNTER — HOSPITAL ENCOUNTER (OUTPATIENT)
Dept: MAMMOGRAPHY | Age: 75
Discharge: HOME | End: 2020-12-16
Attending: FAMILY MEDICINE
Payer: MEDICARE

## 2020-12-16 DIAGNOSIS — Z12.31 SCREENING MAMMOGRAM FOR HIGH-RISK PATIENT: ICD-10-CM

## 2020-12-16 PROCEDURE — 77067 SCR MAMMO BI INCL CAD: CPT

## 2021-05-27 ENCOUNTER — HOSPITAL ENCOUNTER (EMERGENCY)
Age: 76
Discharge: HOME | End: 2021-05-27
Attending: EMERGENCY MEDICINE
Payer: MEDICARE

## 2021-05-27 VITALS
HEART RATE: 69 BPM | DIASTOLIC BLOOD PRESSURE: 76 MMHG | TEMPERATURE: 98.4 F | OXYGEN SATURATION: 99 % | BODY MASS INDEX: 25.21 KG/M2 | WEIGHT: 137 LBS | RESPIRATION RATE: 16 BRPM | SYSTOLIC BLOOD PRESSURE: 156 MMHG | HEIGHT: 62 IN

## 2021-05-27 DIAGNOSIS — R13.10 DYSPHAGIA, UNSPECIFIED TYPE: Primary | ICD-10-CM

## 2021-05-27 PROCEDURE — 99283 EMERGENCY DEPT VISIT LOW MDM: CPT

## 2021-05-27 NOTE — ED NOTES
I have reviewed discharge instructions with the patient. The patient verbalized understanding. Patient left ED via Discharge Method: ambulatory to Home with self. Opportunity for questions and clarification provided. Patient given 0 scripts. To continue your aftercare when you leave the hospital, you may receive an automated call from our care team to check in on how you are doing. This is a free service and part of our promise to provide the best care and service to meet your aftercare needs.  If you have questions, or wish to unsubscribe from this service please call [phone]. Thank you for Choosing our [address] [place] [address] Emergency Department.

## 2021-05-27 NOTE — DISCHARGE INSTRUCTIONS
Follow-up with your gastroenterologist to schedule repeat endoscopy. Return to the emergency department for any other acute concerns.

## 2021-05-27 NOTE — ED PROVIDER NOTES
Patient is a 76-year-old female with a history of reflux and a prior esophageal stricture who comes to the emergency department today complaining of some discomfort in the left neck and difficulty swallowing while eating lunch. Since then she has been able to tolerate liquids. Patient is not a very good historian but I did review the prior records and she had an EGD with diagnosis stricture about 6 months ago per that note she was post to have repeat procedure and dilation around now but patient is not aware of when that appointment is. The history is provided by the patient. Other  This is a new problem. The current episode started 1 to 2 hours ago. The problem has been resolved. Pertinent negatives include no chest pain, no abdominal pain, no headaches and no shortness of breath.         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 53/9/4719    GERD (gastroesophageal reflux disease)     no meds    Goiter     managed with medications    Hypercholesterolemia 10/1/2012    PUD (peptic ulcer disease) 10/1/2012    RA (rheumatoid arthritis) ([facility] [address]     Reflux 10/1/2012    Seizures (HCC)     from benadryl    Sleep apnea     no CPAP       Past Surgical History:   Procedure Laterality Date    HX BREAST BIOPSY Right 10/30/2017    benign    HX CATARACT REMOVAL Left     HX COLONOSCOPY  03/15/2017    Dr Constantine Barraza Band    HX HEENT      thyroid surgery [place]    HX HEENT      throat stretched Dr. Susan Sanderson    secondary to bleeding; fibroids    HX THYROIDECTOMY  06/2005    Dr. Cesar Cano- partial    [place] [address] DX STD W/PROVOCATION      11/12/18         Family History:   Problem Relation Age of Onset    Heart Attack Father     Heart Disease Father     Kidney Disease Mother     Breast Cancer Neg Hx        Social History     Socioeconomic History    Marital status:      Spouse name: Not on file    Number of children: Not on file    Years of education: Not on file    Highest education level: Not on file   Occupational History    Not on file   Tobacco Use    Smoking status: Never Smoker    Smokeless tobacco: Never Used   Substance and Sexual Activity    Alcohol use: No    Drug use: No    Sexual activity: Not on file   Other Topics Concern    Not on file   Social History Narrative    Not on file     Social Determinants of Health     Financial Resource Strain:     Difficulty of Paying Living Expenses:    Food Insecurity:     Worried About Running Out of Food in the Last Year:     [address] in the Last Year:    Transportation Needs:     Lack of Transportation (Medical):  Lack of Transportation (Non-Medical):    Physical Activity:     Days of Exercise per Week:     Minutes of Exercise per Session:    Stress:     Feeling of Stress :    Social Connections:     Frequency of Communication with Friends and Family:     Frequency of Social Gatherings with Friends and Family:     Attends Islam Services:     Active Member of Clubs or Organizations:     Attends Club or Organization Meetings:     Marital Status:    Intimate Partner Violence:     Fear of Current or Ex-Partner:     Emotionally Abused:     Physically Abused:     Sexually Abused: ALLERGIES: Bee sting [sting, bee]; Diphenhydramine hcl; Lidocaine; Amoxicillin; Atorvastatin; Famotidine; Lortab [hydrocodone-acetaminophen]; Lotabs; Methotrexate; Metronidazole; Omeprazole; Remicade [infliximab]; Sulindac; and Travoprost (benzalkonium)    Review of Systems   Constitutional: Negative for chills, fatigue and fever. HENT: Negative for congestion, rhinorrhea and sore throat. Eyes: Negative for pain, discharge and visual disturbance. Respiratory: Negative for cough and shortness of breath. Cardiovascular: Negative for chest pain and palpitations. Gastrointestinal: Negative for abdominal pain, diarrhea and nausea.    Endocrine: Negative for polydipsia and polyuria. Genitourinary: Negative for dysuria, frequency and urgency. Musculoskeletal: Negative for back pain and neck pain. Skin: Negative for rash. Neurological: Negative for seizures, syncope, weakness and headaches. Hematological: Negative. Vitals:    05/27/21 1241   BP: (!) 156/76   Pulse: 69   Resp: 16   Temp: 98.4 °F (36.9 °C)   SpO2: 99%   Weight: 62.1 kg (137 lb)   Height: 5' 2\" (1.575 m)            Physical Exam  Vitals and nursing note reviewed. Constitutional:       Appearance: She is well-developed. Comments: Chronically ill-appearing   HENT:      Head: Normocephalic and atraumatic. Eyes:      Conjunctiva/sclera: Conjunctivae normal.      Pupils: Pupils are equal, round, and reactive to light. Cardiovascular:      Rate and Rhythm: Normal rate and regular rhythm. Pulmonary:      Effort: Pulmonary effort is normal.      Breath sounds: Normal breath sounds. Abdominal:      Palpations: Abdomen is soft. Tenderness: There is no abdominal tenderness. There is no guarding or rebound. Musculoskeletal:         General: No deformity. Normal range of motion. Cervical back: Normal range of motion and neck supple. Lymphadenopathy:      Cervical: No cervical adenopathy. Skin:     General: Skin is warm and dry. Findings: No rash. Neurological:      Mental Status: She is alert and oriented to person, place, and time. GCS: GCS eye subscore is 4. GCS verbal subscore is 5. GCS motor subscore is 6. Cranial Nerves: No cranial nerve deficit. Sensory: No sensory deficit. MDM  Number of Diagnoses or Management Options  Diagnosis management comments: I wore appropriate PPE throughout this patient's ED visit. Chris Parham MD, 2:25 PM    Vital signs are unremarkable physical exam is benign patient is able to drink water here in the emergency department without any difficulty.   On reviewing the record she did have an esophageal stricture diagnosed 6 months ago and was post to have repeat EGD soon. She also recently saw ENT and they did a laryngoscopy and were scheduling a modified barium swallow per the note. Again patient is tolerating liquids at this time no indication for admission or further procedure I have left a message with gastroenterology to try and facilitate outpatient follow-up. Voice dictation software was used during the making of this note. This software is not perfect and grammatical and other typographical errors may be present. This note has been proofread, but may still contain errors.   Rubén Victor MD; 5/27/2021 @2:26 PM   ===================================================================         Amount and/or Complexity of Data Reviewed  Review and summarize past medical records: yes    Risk of Complications, Morbidity, and/or Mortality  Presenting problems: minimal  Diagnostic procedures: minimal  Management options: minimal    Patient Progress  Patient progress: stable         Procedures

## 2021-05-27 NOTE — ED TRIAGE NOTES
To triage in wheelchair. \"When I eat I hear a sound\". States she feels like something is stuck in her throat. Last had esophagus stretched December 2020. When this nurse asked pt what she felt like she was choking on, pt responded \"anything\".

## 2021-06-30 ENCOUNTER — ANESTHESIA EVENT (OUTPATIENT)
Dept: ENDOSCOPY | Age: 76
End: 2021-06-30
Payer: MEDICARE

## 2021-06-30 RX ORDER — SODIUM CHLORIDE, SODIUM LACTATE, POTASSIUM CHLORIDE, CALCIUM CHLORIDE 600; 310; 30; 20 MG/100ML; MG/100ML; MG/100ML; MG/100ML
100 INJECTION, SOLUTION INTRAVENOUS CONTINUOUS
Status: CANCELLED | OUTPATIENT
Start: 2021-06-30

## 2021-07-06 NOTE — PROGRESS NOTES
Unable to reach patient via phone to confirm arrival time for procedure or confirm COVID test or vaccine requirement.

## 2021-07-07 ENCOUNTER — HOSPITAL ENCOUNTER (OUTPATIENT)
Age: 76
Setting detail: OUTPATIENT SURGERY
Discharge: HOME | End: 2021-07-07
Attending: INTERNAL MEDICINE | Admitting: INTERNAL MEDICINE
Payer: MEDICARE

## 2021-07-07 ENCOUNTER — ANESTHESIA (OUTPATIENT)
Dept: ENDOSCOPY | Age: 76
End: 2021-07-07
Payer: MEDICARE

## 2021-07-07 VITALS
HEART RATE: 66 BPM | DIASTOLIC BLOOD PRESSURE: 69 MMHG | WEIGHT: 133 LBS | HEIGHT: 62 IN | TEMPERATURE: 97.7 F | SYSTOLIC BLOOD PRESSURE: 159 MMHG | RESPIRATION RATE: 16 BRPM | OXYGEN SATURATION: 98 % | BODY MASS INDEX: 24.48 KG/M2

## 2021-07-07 PROCEDURE — 76040000025: Performed by: INTERNAL MEDICINE

## 2021-07-07 PROCEDURE — 2709999900 HC NON-CHARGEABLE SUPPLY: Performed by: INTERNAL MEDICINE

## 2021-07-07 PROCEDURE — 74011250636 HC RX REV CODE- 250/636: Performed by: ANESTHESIOLOGY

## 2021-07-07 PROCEDURE — 74011000250 HC RX REV CODE- 250: Performed by: NURSE ANESTHETIST, CERTIFIED REGISTERED

## 2021-07-07 PROCEDURE — 76060000031 HC ANESTHESIA FIRST 0.5 HR: Performed by: INTERNAL MEDICINE

## 2021-07-07 PROCEDURE — 74011250636 HC RX REV CODE- 250/636: Performed by: NURSE ANESTHETIST, CERTIFIED REGISTERED

## 2021-07-07 RX ORDER — GLYCOPYRROLATE 0.2 MG/ML
INJECTION INTRAMUSCULAR; INTRAVENOUS AS NEEDED
Status: DISCONTINUED | OUTPATIENT
Start: 2021-07-07 | End: 2021-07-07 | Stop reason: HOSPADM

## 2021-07-07 RX ORDER — PROPOFOL 10 MG/ML
INJECTION, EMULSION INTRAVENOUS AS NEEDED
Status: DISCONTINUED | OUTPATIENT
Start: 2021-07-07 | End: 2021-07-07 | Stop reason: HOSPADM

## 2021-07-07 RX ORDER — SODIUM CHLORIDE, SODIUM LACTATE, POTASSIUM CHLORIDE, CALCIUM CHLORIDE 600; 310; 30; 20 MG/100ML; MG/100ML; MG/100ML; MG/100ML
100 INJECTION, SOLUTION INTRAVENOUS CONTINUOUS
Status: DISCONTINUED | OUTPATIENT
Start: 2021-07-07 | End: 2021-07-07 | Stop reason: HOSPADM

## 2021-07-07 RX ORDER — PROPOFOL 10 MG/ML
INJECTION, EMULSION INTRAVENOUS
Status: DISCONTINUED | OUTPATIENT
Start: 2021-07-07 | End: 2021-07-07 | Stop reason: HOSPADM

## 2021-07-07 RX ADMIN — PROPOFOL 200 MCG/KG/MIN: 10 INJECTION, EMULSION INTRAVENOUS at 11:32

## 2021-07-07 RX ADMIN — GLYCOPYRROLATE 0.1 MG: 0.2 INJECTION INTRAMUSCULAR; INTRAVENOUS at 11:32

## 2021-07-07 RX ADMIN — SODIUM CHLORIDE, SODIUM LACTATE, POTASSIUM CHLORIDE, AND CALCIUM CHLORIDE 100 ML/HR: 600; 310; 30; 20 INJECTION, SOLUTION INTRAVENOUS at 11:03

## 2021-07-07 RX ADMIN — PROPOFOL 50 MG: 10 INJECTION, EMULSION INTRAVENOUS at 11:32

## 2021-07-07 NOTE — PROCEDURES
Esophagogastroduodenoscopy    DATE of PROCEDURE: 2021    MEDICATIONS ADMINISTERED: MAC    INSTRUMENT: GIF- XP    PROCEDURE:  After obtaining informed consent, the patient was placed in the left lateral position and sedated. The endoscope was advanced under direct vision without difficulty. The esophagus, stomach (including retroflexed views) and duodenum were evaluated. The patient was taken to the recovery area in stable condition. FINDINGS:  ESOPHAGUS: :  Immediately upon entry into oropharynx there was large amount of mucous noted that was suctioned out.  At 15 cm there was a tight stricture that made passing a  scope difficult. It was traversed cautiously and dilation performed with XP scope even. The esophagus past the stricture looked healthy without obvious candida this time. There is also a hiatal hernia measuring 3cm.  Over a guidewire a 30Fr savary dilatore passed and repeat egd revealed heme and rent at stricture.  Additional dilation on this visit not noted to be safe. STOMACH: Normal  DUODENUM: normal    Estimated blood loss: 0-minimal     PLAN:  1. Soft diet today  2. Repeat egd in 4-6 months with repeat dilation. May need to consider surgical PEG at some point if medical issues increase making egd/dilation unsafe.     Toya Cardenas MD  [facility], [place]

## 2021-07-07 NOTE — ANESTHESIA POSTPROCEDURE EVALUATION
Procedure(s):  ESOPHAGOGASTRODUODENOSCOPY (EGD)/ 25  ESOPHAGEAL DILATION. total IV anesthesia    Anesthesia Post Evaluation      Multimodal analgesia: multimodal analgesia not used between 6 hours prior to anesthesia start to PACU discharge  Patient location during evaluation: bedside  Patient participation: complete - patient participated  Level of consciousness: awake and alert  Pain management: adequate  Airway patency: patent  Anesthetic complications: no  Cardiovascular status: acceptable  Respiratory status: acceptable, spontaneous ventilation and nonlabored ventilation  Hydration status: acceptable  Post anesthesia nausea and vomiting:  none      INITIAL Post-op Vital signs:   Vitals Value Taken Time   /60 07/07/21 1156   Temp 36.5 °C (97.7 °F) 07/07/21 1148   Pulse 58 07/07/21 1157   Resp 14 07/07/21 1148   SpO2 100 % 07/07/21 1157   Vitals shown include unvalidated device data.

## 2021-07-07 NOTE — DISCHARGE INSTRUCTIONS
Gastrointestinal Esophagogastroduodenoscopy (EGD) - Upper Exam Discharge Instructions    1. Call Dr. Cherelle Barragan office  for any problems or questions. 2. Contact the doctor's office for follow up appointment as directed. 3. Medication may cause drowsiness for several hours, therefore:  · Do not drive or operate machinery for remainder of the day. · No alcohol today. · Do not make any important or legal decisions for 24 hours. · Do not sign any legal documents for 24 hours. 5. Ordinarily, you may resume regular diet and activity after exam unless otherwise              specified by your physician. 6. For mild soreness in your throat you may use Cepacol throat lozenges or warm               salt-water gargles as needed. Any additional instructions:    1. Soft diet today  2. Repeat egd in 4-6 months with repeat dilation.

## 2021-07-07 NOTE — PROGRESS NOTES
's pre-procedure visit requested by patient. Conveyed care and concern for patient and family. Offered prayer as requested for patient, family, and staff.     Basia Suresh MDiv, BS  Board Certified

## 2021-07-07 NOTE — ANESTHESIA PREPROCEDURE EVALUATION
Anesthetic History               Review of Systems / Medical History  Patient summary reviewed, nursing notes reviewed and pertinent labs reviewed    Pulmonary        Sleep apnea: No treatment           Neuro/Psych              Cardiovascular              Hyperlipidemia    Exercise tolerance: >4 METS  Comments: TTE 2020:  LVEF >65%.   Mild MR, TR   GI/Hepatic/Renal     GERD      PUD (remote hx)    Comments: Esophageal stricture Endo/Other      Hypothyroidism: well controlled  Arthritis (rheumatoid)     Other Findings              Physical Exam    Airway  Mallampati: I  TM Distance: 4 - 6 cm  Neck ROM: normal range of motion   Mouth opening: Normal     Cardiovascular  Regular rate and rhythm,  S1 and S2 normal,  no murmur, click, rub, or gallop             Dental    Dentition: Edentulous     Pulmonary  Breath sounds clear to auscultation               Abdominal  GI exam deferred       Other Findings            Anesthetic Plan    ASA: 2  Anesthesia type: total IV anesthesia          Induction: Intravenous  Anesthetic plan and risks discussed with: Patient      NO LIDOCAINE

## 2021-07-07 NOTE — ROUTINE PROCESS
VSS at discharge. No complaints noted. Education reviewed and signed with patient and family. Pt discharged via wheelchair by Aydee Roque RN.

## 2021-07-07 NOTE — H&P
Gastroenterology Outpatient History and Physical    Patient: Konstantin Fuentes    Physician: Stephany Horn MD    Vital Signs: Blood pressure (!) 189/79, pulse 60, temperature 98.1 °F (36.7 °C), resp. rate 17, height 5' 2\" (1.575 m), weight 60.3 kg (133 lb), SpO2 99 %, not currently breastfeeding. Allergies: Allergies   Allergen Reactions    Bee Sting [Sting, Bee] Anaphylaxis    Diphenhydramine Hcl Seizures     Other reaction(s): Anaphylactic shock-Allergy    Lidocaine Other (comments)     jittery and lost voice; trembling; stuttering    Amoxicillin Other (comments)     stuttering    Atorvastatin Myalgia     Other reaction(s): Myalgia-Intolerance    Famotidine Other (comments)     Not being able to talk  Other reaction(s): Other- (not listed) - Allergy  Not being able to talk    Lortab [Hydrocodone-Acetaminophen] Other (comments)     affected speech    Lotabs Other (comments)     Effects speech  Other reaction(s): Other- (not listed) - Allergy  Effects speech    Methotrexate Other (comments)     messed up immune system  Other reaction(s): Other- (not listed) - Allergy  messed up immune system    Metronidazole Other (comments)     Jittery; loss of voice  Other reaction(s): Other- (not listed) - Allergy  Jittery; loss of voice    Omeprazole Other (comments)     effects speech  Other reaction(s):  Other- (not listed) - Allergy  effects speech    Remicade [Infliximab] Other (comments)     messed up immune system    Sulindac Hives     Other reaction(s): Rash-Allergy    Travoprost (Benzalkonium) Other (comments)     elevated BP  Other reaction(s): Itching-Allergy       Chief Complaint: dysphagia    History of Present Illness: 67 yr old female with high grade esophageal stricture here for repeat dilation    Justification for Procedure: egd with dilation    History:  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 Ishmael Arreguin    secondary to bleeding; fibroids    HX THYROIDECTOMY  06/2005    Dr. Jordan Pollen- partial    [place] [address] DX STD W/PROVOCATION      11/12/18      Social History     Socioeconomic History    Marital status:      Spouse name: Not on file    Number of children: Not on file    Years of education: Not on file    Highest education level: Not on file   Tobacco Use    Smoking status: Never Smoker    Smokeless tobacco: Never Used   Substance and Sexual Activity    Alcohol use: No    Drug use: No     Social Determinants of Health     Financial Resource Strain:     Difficulty of Paying Living Expenses:    Food Insecurity:     Worried About Running Out of Food in the Last Year:     Ran Out of Food in the Last Year:    Transportation Needs:     Lack of Transportation (Medical):      Lack of Transportation (Non-Medical):    Physical Activity:     Days of Exercise per Week:     Minutes of Exercise per Session:    Stress:     Feeling of Stress :    Social Connections:     Frequency of Communication with Friends and Family:     Frequency of Social Gatherings with Friends and Family:     Attends Restoration Services:     Active Member of Clubs or Organizations:     Attends Club or Organization Meetings:     Marital Status:       Family History   Problem Relation Age of Onset    Heart Attack Father     Heart Disease Father     Kidney Disease Mother    [facility] Breast Cancer Neg Hx        Medications:   Prior to Admission medications    Medication Sig Start Date End Date Taking? Authorizing Provider   famotidine (Pepcid) 40 mg tablet Take 40 mg by mouth daily. Yes Provider, Historical   levothyroxine (SYNTHROID) 75 mcg tablet One po every other day alternating with the 50 mcg dose 11/4/20  Yes Meghana RODRIGUEZ MD   levothyroxine (SYNTHROID) 50 mcg tablet Take 1 p.o. every other day alternating with the 75 mcg dose. 11/4/20  Yes Katarina Randhawa MD   cyanocobalamin (VITAMIN B12) 1,000 mcg/mL injection INJECT 1 ML BY INTRAMUSCULAR ROUTE EVERY THIRTY (30) DAYS. 10/14/20  Yes Katarina Randhawa MD   predniSONE (DELTASONE) 5 mg tablet 5 mg daily. 1/6/17  Yes Provider, Historical   ABATACEPT/MALTOSE (ORENCIA IV) by IntraVENous route every thirty (30) days. Yes Provider, Historical       Physical Exam:   General: no distress   HEENT: Head: Normocephalic, no lesions, without obvious abnormality.    Heart: regular rate and rhythm, S1, S2 normal, no murmur, click, rub or gallop   Lungs: chest clear, no wheezing, rales, normal symmetric air entry   Abdominal: Bowel sounds are normal, liver is not enlarged, spleen is not enlarged   Neurological: Grossly normal   Extremities: extremities normal, atraumatic, no cyanosis or edema     Findings/Diagnosis: dysphagia sec to esophageal stricture    Plan of Care/Planned Procedure: egd with dilation

## 2021-12-27 ENCOUNTER — TRANSCRIBE ORDER (OUTPATIENT)
Dept: SCHEDULING | Age: 76
End: 2021-12-27

## 2021-12-27 DIAGNOSIS — Z12.31 VISIT FOR SCREENING MAMMOGRAM: Primary | ICD-10-CM

## 2021-12-28 ENCOUNTER — HOSPITAL ENCOUNTER (OUTPATIENT)
Dept: MAMMOGRAPHY | Age: 76
Discharge: HOME | End: 2021-12-28
Attending: FAMILY MEDICINE
Payer: MEDICARE

## 2021-12-28 DIAGNOSIS — Z12.31 VISIT FOR SCREENING MAMMOGRAM: ICD-10-CM

## 2021-12-28 PROCEDURE — 77067 SCR MAMMO BI INCL CAD: CPT

## 2022-03-07 ENCOUNTER — HOSPITAL ENCOUNTER (OUTPATIENT)
Dept: PHYSICAL THERAPY | Age: 77
Discharge: HOME | End: 2022-03-07
Attending: FAMILY MEDICINE
Payer: MEDICARE

## 2022-03-07 DIAGNOSIS — Z87.19 HISTORY OF ESOPHAGEAL STRICTURE: ICD-10-CM

## 2022-03-07 DIAGNOSIS — R13.19 ESOPHAGEAL DYSPHAGIA: ICD-10-CM

## 2022-03-07 PROCEDURE — 92610 EVALUATE SWALLOWING FUNCTION: CPT

## 2022-03-07 NOTE — THERAPY EVALUATION
Evette Dance Hallums  : 1945  [place]  Secondary: [place] Hernan at [facility]  [address], [address], [place], [facility] [address] [facility]  [phone]   [phone]       OUTPATIENT SPEECH THERAPY:Initial Assessment   ICD-10: Treatment Diagnosis: R13.19 Other Dysphagia  Precautions/Allergies:   Bee sting [sting, bee]; Diphenhydramine hcl; Lidocaine; Amoxicillin; Atorvastatin; Famotidine; Lortab [hydrocodone-acetaminophen]; Lotabs; Methotrexate; Metronidazole; Omeprazole; Remicade [infliximab]; Sulindac; and Travoprost (benzalkonium)   TREATMENT PLAN:  Effective Dates: 3/7/2022 TO 2022 (30 days). Frequency/Duration: recommendations pending further objective assessment via Modified Barium Swallow Study MEDICAL/REFERRING DIAGNOSIS:  History of esophageal stricture [Z87.19]  Esophageal dysphagia [R13.19]   DATE OF ONSET: unknown  REFERRING PHYSICIAN: Tamika Quintana MD MD Orders: Evaluate and treat       INITIAL ASSESSMENT (DATE 3/7/2022):  Ms. Brock Meehan presents with complaint of a \"whooshing, gurgly sound\" on occasions when swallowing. Patient reports it feels like something is coming back up, or gets closed off after swallowing. Swallow assessed today with water, puree, and cracker. Functional swallow noted with po trials. Patient noted with burping during and following po trials and reports frequent burping with meals. Patient reports typically eating soft cooked foods and mashing more difficult textures. Patient has had EGD with dilation in 2021, with findings of esophageal stricture and hiatal hernia. Repeat dilation was recommended 4-6 months later; however, provider expressed the potential consideration of surgical PEG if medical issues increase making egd/dilation unsafe. Patient reports to have been seen by ENT with unrevealing findings on laryngoscopy.  Patient's PCP and ENT have advised Modified Barium Swallow Study, and if findings are unrevealing, return to gastroenterology. Recommend further objective assessment via Modified Barium Swallow study. Patient would also benefit from re-consult to GI given history of hiatal hernia, esophageal stricture, and previously recommended repeat EGD with dilation. PROBLEM LIST (Impairments causing functional limitations):  1. Dysphagia INTERVENTIONS PLANNED: (Treatment may consist of any combination of the following)  1. Modified Barium Swallow Study   RECOMMENDATIONS/PLAN:   DIET:    Regular Consistency   Thin Liquids  MEDICATIONS: With liquid as tolerated   COMPENSATORY STRATEGIES/MODIFICATIONS INCLUDING:  · Upright for all PO  · Alternate liquids/solids  · Small bites and sips  · Remain upright for 20-30 min after any PO  · Slow rate of PO intake   OTHER RECOMMENDATIONS (including follow up treatment recommendations):   · GI Consult  · Modified Barium Swallow Study     GOALS: (Goals have been discussed and agreed upon with patient.)  Goals for therapy pending results from Modified Barium Swallow Study       SUBJECTIVE:   Patient pleasant and cooperative. Pain associated with swallow: 0/10   HISTORY:   History of Injury/Illness (Reason for Referral):  Patient reports complaint of a \"whooshing, gurgly sound\" on occasions when swallowing. Patient reports it feels like something is coming back up, or gets closed off when swallowing. Patient has had EGD with dilation in July 2021, with findings of esophageal stricture and hiatal hernia. Repeat dilation was recommended 4-6 months later; however, provider expressed the potential consideration of surgical PEG if medical issues increase making egd/dilation unsafe. Patient reports to have been seen by ENT with unrevealing findings on laryngoscopy. Patient's PCP and ENT have advised Modified Barium Swallow Study, and if findings are unrevealing, return to gastroenterology.   Past Medical History/Comorbidities:   Ms. Caitlin Ellis  has a past medical history of 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 (10/1/2012), GERD (gastroesophageal reflux disease), Goiter, Hypercholesterolemia (10/1/2012), PUD (peptic ulcer disease) (10/1/2012), RA (rheumatoid arthritis) ([facility] [address] Reflux (10/1/2012), Seizures ([facility] [address] and Sleep apnea. Ms. Brock Meehan  has a past surgical history that includes hx thyroidectomy (06/2005); hx colonoscopy (03/15/2017); pr esophgl balo distension dx std w/provocation; hx hysterectomy (1980); hx breast biopsy (Right, 10/30/2017); hx cataract removal (Left); hx heent; and hx heent. Social/Work History and Prior Level of Function:   Retired  Current Dietary Status:  Regular consistency/thin liquids; Patient reports mashing/pureeing some textures. Previous Instrumental Swallow Studies:  None reported. Current Medications:       Current Outpatient Medications:     levothyroxine (SYNTHROID) 75 mcg tablet, One po every other day alternating with the 50 mcg dose, Disp: 90 Tablet, Rfl: 1    levothyroxine (SYNTHROID) 50 mcg tablet, Take 1 p.o. every other day alternating with the 75 mcg dose., Disp: 90 Tablet, Rfl: 1    pantoprazole (PROTONIX) 40 mg tablet, Take 1 Tablet by mouth daily. , Disp: 90 Tablet, Rfl: 3    cyanocobalamin (VITAMIN B12) 1,000 mcg/mL injection, INJECT 1 ML BY INTRAMUSCULAR ROUTE EVERY THIRTY (30) DAYS., Disp: 1 mL, Rfl: 11    ABATACEPT/MALTOSE (ORENCIA IV), by IntraVENous route every thirty (30) days. , Disp: , Rfl:    Date Last Reviewed:  3/7/2022   OBJECTIVE:   OUTCOME MEASURE:   Tool Used: National Outcomes Measurement System: Functional Communication Measures: SWALLOWING  Score: Level 5:  Swallowing is safe with minimal diet restriction and/or occasionally requires minimal cueing to use compensatory strategies. The individual may occasionally self-cue. All nutrition and hydration needs are met by mouth at mealtime. Initial: 5 (Date: 3/7/2022)   Interpretation of Score:    This measure describes the functional swallowing status subsequent to speech-language pathology treatment of patients with dysphagia. Orientation:  Person  Place  Time  Situation    Oral Assessment:  Labial: No impairment  Lingual: No impairment  Dentition: Upper Dentures and lower endentulous; Patient reports having lower dentures, forgetting them at home  Oral Hygiene: Adequate  Vocal Quality: WFL  Speech Inteligiblity: WFL    PO Trials: thin liquids, puree, cracker  Oral Phase: prolonged mastication; however, contributed by patient not having lower dentures (left at home); oral residue after swallow, patient independently used liquid wash, alternating bites/sips  Pharyngeal Phase: Timely swallow, clear to cervical auscultation. Other Observations: No occurrence of cough/throat clearing or change in vocal quality throughout po trials; patient refused trials of mixed consistency (fruit cup); patient burping during and following po trials. TODAY'S TREATMENT:   In addition to Assessment/Re-Assessment sessions the following treatments were rendered)  Assessment only; No treatment(s) provided today  Patient seen for swallow assessment. · Recommend GI consult  · Recommend MBS    Regarding Tova Rose's therapy, I certify that the treatment plan above will be carried out by a therapist or under their direction.   Thank you for this referral,  MARY [place]     Total Duration:  Time In: 1100  Time Out: 1150  Referring Physician Signature: Danna Slater MD     Future Appointments   Date Time Provider Acacia Lachelle   3/10/2022  2:40 PM FPA PM NURSE ONLY [facility] FPA FPA   11/7/2022  9:00 AM FPA PM MISCELLANEOUS [facility] FP FPA   11/14/2022  9:30 AM [facility]

## 2022-03-08 NOTE — PROGRESS NOTES
Outpatient Rehab Services  Referral Form/Physician Order  Central Scheduling Fax: [phone]  Speak to a :  Call [phone]   Please review and co-sign (electronically) OR sign and return to the below indicated clinic's fax number if you agree with this request.  Thank you! NAME:  Melvin Wade SSN:  [id number] :  1945   ADDRESS:  [place] [address] Daytime Phone:  [phone] ([facility])[phone] ([place])    Diagnosis & Date of Onset:  History of esophageal stricture [Z87.19]  Esophageal dysphagia [R13.19] Special Precautions:   Frequency:  [] to be determined by therapist after evaluation   OR   ____ X per week X ____ weeks    Services    [] Evaluate & Treat  [x] Special Orders_Modified Barium Swallow Study_________   [] Physical Therapy  [] Occupational Therapy   [] Speech Therapy  [x] MBS w/ Speech Therapy    Specialized Programs    [] Aquatic Therapy [] Lymphedema [] Oncology Rehab   [] Balance Rehab [] Parkinson's Program [] Osteoporosis   [] Fibromyalgia [] Motion Analysis [] Spine Rehab   [] Industrial Rehab [] Hand & Upper Extremity [] Sports Injury Rehab    [] Urinary Incontinence     Locations    @ [address] [place] [address] [address], [address]  [facility] [address] [facility]  Ph: [phone]  Fax: [phone] @ [address] [place] [address],[address]  [place], [address]  Ph: [phone]  Fax: [phone] @ [address], [address] [place] [address]  Ph: [phone]  Fax: [phone]        @ [address] [place] [address] Vance Garcia [address]  Ph: [phone]  Fax: [phone] @ [address] [facility], [address]   Ph: [phone]  Fax: [phone] @ [address] [place] [address], [address]  Ph: [phone]  Fax: [phone]        @ [address]  Ph: [phone]  Fax: [phone] @ [address] [place] [address] [address] [facility], [address] [place] [address]  Ph: [phone]  Fax: [phone] @ [facility]  [address] Jenny Haas   Ph: [phone]         @ [address]  Ph: [phone]  Fax: [phone]      This section is not needed if signing electronically   I certify that I have examined the above patient and outpatient rehab services are medically necessary.    ___________________________________________           Signature of Physician/Provider    ___________________________________________            Practice Name   ______________________   Date    ______________________   Referral Contact

## 2022-03-18 PROBLEM — Z87.19 HISTORY OF ESOPHAGEAL STRICTURE: Status: ACTIVE | Noted: 2019-01-14

## 2022-03-19 PROBLEM — G45.9 TIA ON MEDICATION: Status: ACTIVE | Noted: 2017-01-18

## 2022-03-19 PROBLEM — T88.7XXA MEDICATION SIDE EFFECTS: Status: ACTIVE | Noted: 2017-01-18

## 2022-03-19 PROBLEM — R47.89 EPISODES OF SPEECH ARREST: Status: ACTIVE | Noted: 2017-10-27

## 2022-03-19 PROBLEM — Z87.442 HISTORY OF KIDNEY STONES: Status: ACTIVE | Noted: 2020-11-04

## 2022-03-19 PROBLEM — Z79.899 ENCOUNTER FOR MEDICATION MANAGEMENT: Status: ACTIVE | Noted: 2017-01-18

## 2022-03-20 PROBLEM — K80.20 ASYMPTOMATIC GALLSTONES: Status: ACTIVE | Noted: 2017-07-12

## 2022-05-13 PROBLEM — G89.4 CHRONIC PAIN SYNDROME: Status: ACTIVE | Noted: 2022-05-13

## 2022-05-13 PROBLEM — N18.30 CHRONIC RENAL DISEASE, STAGE III (HCC): Status: ACTIVE | Noted: 2022-05-13

## 2022-06-10 ENCOUNTER — NURSE ONLY (OUTPATIENT)
Dept: FAMILY MEDICINE CLINIC | Facility: CLINIC | Age: 77
End: 2022-06-10

## 2022-06-10 DIAGNOSIS — E53.8 B12 DEFICIENCY: Primary | ICD-10-CM

## 2022-06-10 RX ORDER — CYANOCOBALAMIN 1000 UG/ML
1000 INJECTION INTRAMUSCULAR; SUBCUTANEOUS ONCE
Status: COMPLETED | OUTPATIENT
Start: 2022-06-10 | End: 2022-06-10

## 2022-06-10 RX ADMIN — CYANOCOBALAMIN 1000 MCG: 1000 INJECTION INTRAMUSCULAR; SUBCUTANEOUS at 14:33

## 2022-07-08 ENCOUNTER — NURSE ONLY (OUTPATIENT)
Dept: FAMILY MEDICINE CLINIC | Facility: CLINIC | Age: 77
End: 2022-07-08
Payer: MEDICARE

## 2022-07-08 DIAGNOSIS — E53.8 B12 DEFICIENCY: Primary | ICD-10-CM

## 2022-07-08 PROCEDURE — 96372 THER/PROPH/DIAG INJ SC/IM: CPT | Performed by: FAMILY MEDICINE

## 2022-07-08 RX ORDER — CYANOCOBALAMIN 1000 UG/ML
1000 INJECTION INTRAMUSCULAR; SUBCUTANEOUS ONCE
Status: COMPLETED | OUTPATIENT
Start: 2022-07-08 | End: 2022-07-08

## 2022-07-08 RX ADMIN — CYANOCOBALAMIN 1000 MCG: 1000 INJECTION INTRAMUSCULAR; SUBCUTANEOUS at 15:11

## 2022-07-12 ENCOUNTER — PREP FOR PROCEDURE (OUTPATIENT)
Dept: ADMINISTRATIVE | Age: 77
End: 2022-07-12

## 2022-07-12 RX ORDER — SODIUM CHLORIDE 0.9 % (FLUSH) 0.9 %
5-40 SYRINGE (ML) INJECTION PRN
OUTPATIENT
Start: 2022-07-12

## 2022-07-12 RX ORDER — SODIUM CHLORIDE 9 MG/ML
INJECTION, SOLUTION INTRAVENOUS PRN
OUTPATIENT
Start: 2022-07-12

## 2022-07-12 RX ORDER — SODIUM CHLORIDE 0.9 % (FLUSH) 0.9 %
5-40 SYRINGE (ML) INJECTION EVERY 12 HOURS SCHEDULED
OUTPATIENT
Start: 2022-07-12

## 2022-07-27 ENCOUNTER — ANESTHESIA EVENT (OUTPATIENT)
Dept: ENDOSCOPY | Age: 77
End: 2022-07-27
Payer: MEDICARE

## 2022-07-28 ENCOUNTER — HOSPITAL ENCOUNTER (OUTPATIENT)
Age: 77
Setting detail: OUTPATIENT SURGERY
Discharge: HOME | End: 2022-07-28
Attending: INTERNAL MEDICINE | Admitting: INTERNAL MEDICINE
Payer: MEDICARE

## 2022-07-28 ENCOUNTER — ANESTHESIA (OUTPATIENT)
Dept: ENDOSCOPY | Age: 77
End: 2022-07-28
Payer: MEDICARE

## 2022-07-28 VITALS
RESPIRATION RATE: 20 BRPM | DIASTOLIC BLOOD PRESSURE: 70 MMHG | OXYGEN SATURATION: 100 % | HEART RATE: 60 BPM | TEMPERATURE: 97.6 F | WEIGHT: 134.8 LBS | BODY MASS INDEX: 24.66 KG/M2 | SYSTOLIC BLOOD PRESSURE: 154 MMHG

## 2022-07-28 PROCEDURE — 3700000001 HC ADD 15 MINUTES (ANESTHESIA): Performed by: INTERNAL MEDICINE

## 2022-07-28 PROCEDURE — 88305 TISSUE EXAM BY PATHOLOGIST: CPT

## 2022-07-28 PROCEDURE — 2709999900 HC NON-CHARGEABLE SUPPLY: Performed by: INTERNAL MEDICINE

## 2022-07-28 PROCEDURE — 7100000011 HC PHASE II RECOVERY - ADDTL 15 MIN: Performed by: INTERNAL MEDICINE

## 2022-07-28 PROCEDURE — 3609010600 HC COLONOSCOPY POLYPECTOMY SNARE/COLD BIOPSY: Performed by: INTERNAL MEDICINE

## 2022-07-28 PROCEDURE — 3609017700 HC EGD DILATION GASTRIC/DUODENAL STRICTURE: Performed by: INTERNAL MEDICINE

## 2022-07-28 PROCEDURE — 3700000000 HC ANESTHESIA ATTENDED CARE: Performed by: INTERNAL MEDICINE

## 2022-07-28 PROCEDURE — 6360000002 HC RX W HCPCS: Performed by: NURSE ANESTHETIST, CERTIFIED REGISTERED

## 2022-07-28 PROCEDURE — 2580000003 HC RX 258: Performed by: ANESTHESIOLOGY

## 2022-07-28 PROCEDURE — 7100000010 HC PHASE II RECOVERY - FIRST 15 MIN: Performed by: INTERNAL MEDICINE

## 2022-07-28 RX ORDER — PROPOFOL 10 MG/ML
INJECTION, EMULSION INTRAVENOUS PRN
Status: DISCONTINUED | OUTPATIENT
Start: 2022-07-28 | End: 2022-07-28 | Stop reason: SDUPTHER

## 2022-07-28 RX ORDER — SODIUM CHLORIDE 0.9 % (FLUSH) 0.9 %
5-40 SYRINGE (ML) INJECTION EVERY 12 HOURS SCHEDULED
Status: DISCONTINUED | OUTPATIENT
Start: 2022-07-28 | End: 2022-07-28 | Stop reason: HOSPADM

## 2022-07-28 RX ORDER — SODIUM CHLORIDE 0.9 % (FLUSH) 0.9 %
5-40 SYRINGE (ML) INJECTION PRN
Status: DISCONTINUED | OUTPATIENT
Start: 2022-07-28 | End: 2022-07-28 | Stop reason: HOSPADM

## 2022-07-28 RX ORDER — MIDAZOLAM HYDROCHLORIDE 2 MG/2ML
2 INJECTION, SOLUTION INTRAMUSCULAR; INTRAVENOUS
Status: DISCONTINUED | OUTPATIENT
Start: 2022-07-28 | End: 2022-07-28 | Stop reason: HOSPADM

## 2022-07-28 RX ORDER — SODIUM CHLORIDE, SODIUM LACTATE, POTASSIUM CHLORIDE, CALCIUM CHLORIDE 600; 310; 30; 20 MG/100ML; MG/100ML; MG/100ML; MG/100ML
INJECTION, SOLUTION INTRAVENOUS CONTINUOUS
Status: DISCONTINUED | OUTPATIENT
Start: 2022-07-28 | End: 2022-07-28 | Stop reason: HOSPADM

## 2022-07-28 RX ORDER — FENTANYL CITRATE 50 UG/ML
100 INJECTION, SOLUTION INTRAMUSCULAR; INTRAVENOUS
Status: DISCONTINUED | OUTPATIENT
Start: 2022-07-28 | End: 2022-07-28 | Stop reason: HOSPADM

## 2022-07-28 RX ORDER — ONDANSETRON 2 MG/ML
4 INJECTION INTRAMUSCULAR; INTRAVENOUS
Status: DISCONTINUED | OUTPATIENT
Start: 2022-07-28 | End: 2022-07-28 | Stop reason: HOSPADM

## 2022-07-28 RX ADMIN — PROPOFOL 50 MG: 10 INJECTION, EMULSION INTRAVENOUS at 09:06

## 2022-07-28 RX ADMIN — PROPOFOL 20 MG: 10 INJECTION, EMULSION INTRAVENOUS at 09:30

## 2022-07-28 RX ADMIN — SODIUM CHLORIDE, SODIUM LACTATE, POTASSIUM CHLORIDE, AND CALCIUM CHLORIDE: 600; 310; 30; 20 INJECTION, SOLUTION INTRAVENOUS at 08:22

## 2022-07-28 RX ADMIN — PROPOFOL 10 MG: 10 INJECTION, EMULSION INTRAVENOUS at 09:22

## 2022-07-28 RX ADMIN — PROPOFOL 10 MG: 10 INJECTION, EMULSION INTRAVENOUS at 09:12

## 2022-07-28 RX ADMIN — PROPOFOL 20 MG: 10 INJECTION, EMULSION INTRAVENOUS at 09:25

## 2022-07-28 RX ADMIN — PROPOFOL 20 MG: 10 INJECTION, EMULSION INTRAVENOUS at 09:16

## 2022-07-28 RX ADMIN — PROPOFOL 20 MG: 10 INJECTION, EMULSION INTRAVENOUS at 09:36

## 2022-07-28 RX ADMIN — PROPOFOL 20 MG: 10 INJECTION, EMULSION INTRAVENOUS at 09:08

## 2022-07-28 RX ADMIN — PROPOFOL 20 MG: 10 INJECTION, EMULSION INTRAVENOUS at 09:20

## 2022-07-28 RX ADMIN — PROPOFOL 20 MG: 10 INJECTION, EMULSION INTRAVENOUS at 09:10

## 2022-07-28 RX ADMIN — SODIUM CHLORIDE, SODIUM LACTATE, POTASSIUM CHLORIDE, AND CALCIUM CHLORIDE: 600; 310; 30; 20 INJECTION, SOLUTION INTRAVENOUS at 08:21

## 2022-07-28 RX ADMIN — PROPOFOL 20 MG: 10 INJECTION, EMULSION INTRAVENOUS at 09:42

## 2022-07-28 ASSESSMENT — PAIN - FUNCTIONAL ASSESSMENT: PAIN_FUNCTIONAL_ASSESSMENT: 0-10

## 2022-07-28 NOTE — ANESTHESIA PRE PROCEDURE
Department of Anesthesiology  Preprocedure Note       Name:  Saeid Arriaga   Age:  67 y.o.  :  1945                                          MRN:  [id number]         Date:  2022      Surgeon: Luis Hernandez):  Ellie Tanner MD    Procedure: Procedure(s):  EGD ESOPHAGOGASTRODUODENOSCOPY  COLORECTAL CANCER SCREENING, NOT HIGH RISK/ 26    Medications prior to admission:   Prior to Admission medications    Medication Sig Start Date End Date Taking? Authorizing Provider   cyanocobalamin 1000 MCG/ML injection INJECT 1 ML BY INTRAMUSCULAR ROUTE EVERY THIRTY (30) DAYS. 21   Ar Automatic Reconciliation   levothyroxine (SYNTHROID) 50 MCG tablet Take 1 p.o. every other day alternating with the 75 mcg dose. 22   Ar Automatic Reconciliation   levothyroxine (SYNTHROID) 75 MCG tablet One po every other day alternating with the 50 mcg dose 22   Ar Automatic Reconciliation   pantoprazole (PROTONIX) 40 MG tablet Take 40 mg by mouth daily 22   Ar Automatic Reconciliation       Current medications:    No current facility-administered medications for this encounter. Current Outpatient Medications   Medication Sig Dispense Refill    cyanocobalamin 1000 MCG/ML injection INJECT 1 ML BY INTRAMUSCULAR ROUTE EVERY THIRTY (30) DAYS.  levothyroxine (SYNTHROID) 50 MCG tablet Take 1 p.o. every other day alternating with the 75 mcg dose.  levothyroxine (SYNTHROID) 75 MCG tablet One po every other day alternating with the 50 mcg dose      pantoprazole (PROTONIX) 40 MG tablet Take 40 mg by mouth daily         Allergies: Allergies   Allergen Reactions    Diphenhydramine Other (See Comments)     Other reaction(s):  Anaphylactic shock-Allergy    Lidocaine Other (See Comments)     jittery and lost voice; trembling; stuttering    Amoxicillin Other (See Comments)     stuttering    Atorvastatin Other (See Comments)     Other reaction(s): Myalgia-Intolerance    Famotidine Other (See Comments)     Not being able to talk  Other reaction(s): Other- (not listed) - Allergy  Not being able to talk    Hydrocodone-Acetaminophen Other (See Comments)     affected speech    Infliximab Other (See Comments)     messed up immune system    Methotrexate Other (See Comments)     messed up immune system  Other reaction(s): Other- (not listed) - Allergy  messed up immune system    Metronidazole Other (See Comments)     Jittery; loss of voice  Other reaction(s): Other- (not listed) - Allergy  Jittery; loss of voice    Omeprazole Other (See Comments)     effects speech  Other reaction(s): Other- (not listed) - Allergy  effects speech    Sulindac Hives     Other reaction(s): Rash-Allergy       Problem List:    Patient Active Problem List   Diagnosis Code    Gastroesophageal reflux disease with esophagitis K21.00    B12 deficiency E53.8    Hypercholesterolemia E78.00    History of esophageal stricture Z87.19    Medication side effects T88. 7XXA    TIA on medication G45.9    History of kidney stones Z87.442    Acquired hypothyroidism E03.9    Episodes of speech arrest R47.89    Encounter for medication management Z79.899    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 E36.3    RA (rheumatoid arthritis) ([facility]) M06.9    Asymptomatic gallstones K80.20    Stuttering F80.81    PUD (peptic ulcer disease) K27.9    Chronic renal disease, stage III ([facility]) N18.30    Chronic pain syndrome G89.4       Past Medical History:        Diagnosis Date    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 89/0/7538    GERD (gastroesophageal reflux disease)     no meds    Goiter     managed with medications    Hypercholesterolemia 10/1/2012    PUD (peptic ulcer disease) 10/1/2012    RA (rheumatoid [facility]) ([facility] [address]     Reflux 10/1/2012    Seizures ([facility] [address]     from benadryl    Sleep apnea     no CPAP       Past Surgical History:        Procedure Laterality Date    BREAST BIOPSY Right 10/30/2017    benign    CATARACT REMOVAL Left     COLONOSCOPY 03/15/2017    Dr Manoj Black DX STD W/PROVOCATION      11/12/18    HEENT      throat stretched Dr. Swetha Robbins      thyroid surgery [address]    secondary to bleeding; fibroids    MAHAMED STEROTACTIC LOC BREAST BIOPSY RIGHT Right 10/30/2017    MAHAMED STEROTACTIC LOC BREAST BIOPSY RIGHT 10/30/2017 [facility]    THYROIDECTOMY  06/2005    Dr. Satnam Joseph- partial       Social History:    Social History     Tobacco Use    Smoking status: Never    Smokeless tobacco: Never   Substance Use Topics    Alcohol use: No                                Counseling given: Not Answered      Vital Signs (Current): There were no vitals filed for this visit.                                            BP Readings from Last 3 Encounters:   02/28/22 (!) 158/70   11/08/21 136/78   08/09/21 136/60       NPO Status:                                                                                 BMI:   Wt Readings from Last 3 Encounters:   02/28/22 142 lb (64.4 kg)   11/08/21 142 lb (64.4 kg)   08/09/21 128 lb 9.6 oz (58.3 kg)     There is no height or weight on file to calculate BMI.    CBC:   Lab Results   Component Value Date/Time    WBC 5.3 11/08/2021 10:55 AM    RBC 4.15 11/08/2021 10:55 AM    HGB 12.4 11/08/2021 10:55 AM    HCT 36.2 11/08/2021 10:55 AM    MCV 87 11/08/2021 10:55 AM    RDW 12.8 11/08/2021 10:55 AM     11/08/2021 10:55 AM       CMP:   Lab Results   Component Value Date/Time     11/08/2021 10:55 AM    K 4.4 11/08/2021 10:55 AM     11/08/2021 10:55 AM    CO2 21 11/08/2021 10:55 AM    BUN 31 11/08/2021 10:55 AM    CREATININE 0.97 11/08/2021 10:55 AM    GFRAA 65 11/08/2021 10:55 AM    AGRATIO 1.2 11/08/2021 10:55 AM    GLUCOSE 92 11/08/2021 10:55 AM    PROT 6.2 11/08/2021 10:55 AM    CALCIUM 9.4 11/08/2021 10:55 AM    BILITOT 0.4 11/08/2021 10:55 AM    ALKPHOS 94 11/08/2021 10:55 AM    AST 29 11/08/2021 10:55 AM    ALT 24 11/08/2021 10:55 AM POC Tests: No results for input(s): POCGLU, POCNA, POCK, POCCL, POCBUN, POCHEMO, POCHCT in the last 72 hours. Coags: No results found for: PROTIME, INR, APTT    HCG (If Applicable): No results found for: PREGTESTUR, PREGSERUM, HCG, HCGQUANT     ABGs: No results found for: PHART, PO2ART, VXO5VYC, AXK7NJR, BEART, H5CRZDNY     Type & Screen (If Applicable):  No results found for: LABABO, LABRH    Drug/Infectious Status (If Applicable):  No results found for: HIV, HEPCAB    COVID-19 Screening (If Applicable):   Lab Results   Component Value Date/Time    COVID19 Not Detected 07/05/2021 10:23 AM    COVID19 Performed 07/05/2021 10:23 AM           Anesthesia Evaluation  Patient summary reviewed and Nursing notes reviewed no history of anesthetic complications:   Airway: Mallampati: II          Dental:          Pulmonary: breath sounds clear to auscultation  (+) sleep apnea: on noncompliant,                             Cardiovascular:  Exercise tolerance: good (>4 METS),           Rhythm: regular    Echocardiogram reviewed               ROS comment: Echo and Stress 2020- Normal EF, mild MR, TR, normal perfusion, low risk scan     Neuro/Psych:   (+) TIA,             GI/Hepatic/Renal:   (+) GERD:,           Endo/Other:    (+) hypothyroidism: arthritis: rheumatoid. , .                 Abdominal:             Vascular: negative vascular ROS. Other Findings:           Anesthesia Plan      TIVA     ASA 2       Induction: intravenous. Anesthetic plan and risks discussed with patient.                         Luisa Reynoso MD   7/28/2022

## 2022-07-28 NOTE — H&P
[facility] Outpatient History and Physical Note         [facility] Outpatient History and Physical    Patient: Malini Medel    Physician: Carolee Blas MD    Chief Complaint: dysphagia and polyps    History of Present Illness: 67 yr old female with tight esophageal stricture requring frequent dilations here with recurrent symptoms. She also has a history of polyps and needs surveillance colonoscopy        Justification for Procedure: cancer prevention    PMH:   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 05/5/5630    GERD (gastroesophageal reflux disease)     no meds    Goiter     managed with medications    Hypercholesterolemia 10/1/2012    PUD (peptic ulcer disease) 10/1/2012    RA (rheumatoid arthritis) ([facility] [address]     Reflux 10/1/2012    Seizures ([facility])     from benadryl    Sleep apnea     no CPAP     PSH: is required. Please contact your  to configure this [address] [facility]. Allergies: Allergies   Allergen Reactions    Diphenhydramine Other (See Comments)     Other reaction(s): Anaphylactic shock-Allergy    Lidocaine Other (See Comments)     jittery and lost voice; trembling; stuttering    Amoxicillin Other (See Comments)     stuttering    Atorvastatin Other (See Comments)     Other reaction(s): Myalgia-Intolerance    Famotidine Other (See Comments)     Not being able to talk  Other reaction(s): Other- (not listed) - Allergy  Not being able to talk    Hydrocodone-Acetaminophen Other (See Comments)     affected speech    Infliximab Other (See Comments)     messed up immune system    Methotrexate Other (See Comments)     messed up immune system  Other reaction(s): Other- (not listed) - Allergy  messed up immune system    Metronidazole Other (See Comments)     Jittery; loss of voice  Other reaction(s): Other- (not listed) - Allergy  Jittery; loss of voice    Omeprazole Other (See Comments)     effects speech  Other reaction(s):  Other- (not listed) - Allergy  effects speech    Sulindac Hives     Other reaction(s): Rash-Allergy     Prior to Admission medications    Medication Sig Start Date End Date Taking? Authorizing Provider   cyanocobalamin 1000 MCG/ML injection INJECT 1 ML BY INTRAMUSCULAR ROUTE EVERY THIRTY (30) DAYS. 11/8/21   Ar Automatic Reconciliation   levothyroxine (SYNTHROID) 50 MCG tablet Take 1 p.o. every other day alternating with the 75 mcg dose. 2/28/22   Ar Automatic Reconciliation   levothyroxine (SYNTHROID) 75 MCG tablet One po every other day alternating with the 50 mcg dose 2/28/22   Ar Automatic Reconciliation   pantoprazole (PROTONIX) 40 MG tablet Take 40 mg by mouth daily 2/28/22   Ar Automatic Reconciliation       Physical exam:  Vital Signs: Blood pressure (!) 145/81, pulse 71, temperature 97.8 °F (36.6 °C), temperature source Temporal, resp. rate 18, weight 134 lb 12.8 oz (61.1 kg), SpO2 96 %.   Gen: Alert and oriented  Lungs: Clear  Heart: RRR w/o m,g,r  Abdomen: Benign     Proceed with EGD and colo    Erickson Bell MD  [facility]

## 2022-07-28 NOTE — ANESTHESIA POSTPROCEDURE EVALUATION
Department of Anesthesiology  Postprocedure Note    Patient: Leslee Granados  MRN: [id number]  YOB: 1945  Date of evaluation: 7/28/2022      Procedure Summary     Date: 07/28/22 [facility] / [facility] / [facility]    Anesthesia Start: 0901 Anesthesia Stop: 5615    Procedures:       EGD ESOPHAGOGASTRODUODENOSCOPY/ DILATATION (Upper GI Region)      COLONOSCOPY/ POLYPECTOMY (Lower GI Region) Diagnosis:       Esophageal obstruction      Other dysphagia      Hx of colonic polyps      (Esophageal obstruction [K22.2])      (Other dysphagia [R13.19])      (Hx of colonic polyps [Z86.010])    Providers: Mike Whiting MD Responsible Provider: Rama Melendrez MD    Anesthesia Type: TIVA ASA Status: 2          Anesthesia Type: No value filed.     Garrett Phase I: Garrett Score: 8    Garrett Phase II: Garrett Score: 8      Anesthesia Post Evaluation    Patient location during evaluation: bedside  Patient participation: complete - patient participated  Level of consciousness: awake and alert  Airway patency: patent  Nausea & Vomiting: nausea  Complications: no  Cardiovascular status: hemodynamically stable  Respiratory status: acceptable, nonlabored ventilation and spontaneous ventilation  Hydration status: euvolemic

## 2022-07-28 NOTE — PROCEDURES
Esophagogastroduodenoscopy    DATE of PROCEDURE: 7/28/2022    MEDICATIONS ADMINISTERED: MAC    INSTRUMENT: GIF    PROCEDURE:  After obtaining informed consent, the patient was placed in the left lateral position and sedated. The endoscope was advanced under direct vision without difficulty. The esophagus, stomach (including retroflexed views) and duodenum were evaluated. The patient was taken to the recovery area in stable condition. FINDINGS:  ESOPHAGUS:  Benign stricture noted at 15cm from entry that was not able to passed with GIF scope. Cautiously a 28Fr and 30Fr myers dilator passed without resistance. The 30Fr myers had heme after and the GIF scope was then traversed past the stricture. Heme and rent noted c/w adequate dilation. Dilating further would not be safe. Stasis noted in the distal esophagus  STOMACH:  There is a hiatal hernia. No ulcers or masses in the stomach.   DUODENUM: Normal    Estimated blood loss: 0-minimal     PLAN:  Continue pantoprazole 40 daily  Repeat EGD in 3 months again for furhter dilation    Joi Polanco MD  [facility] [place]

## 2022-07-28 NOTE — DISCHARGE INSTRUCTIONS
Upper GI Endoscopy: What to Expect at [address] [place] [address]  After you have an endoscopy you will be able to go home after your doctor or nurse checks to make sure you are not having any problems. You may have to stay overnight if you had treatment during the test. You may have a sore throat for a day or two after the test.  This care sheet gives you a general idea about what to expect after the test.  How can you care for yourself at home? Activity  Rest as much as you need to after you go home. You should be able to go back to your usual activities the day after the test.  Diet  Follow your doctor's directions for eating after the test.  Drink plenty of fluids (unless your doctor has told you not to). Medications  If you have a sore throat the day after the test, use an over-the-counter spray to numb your throat. Medication Interaction:  During your procedure you potentially received a medication or medications which may reduce the effectiveness of oral contraceptives. Please consider other forms of contraception for 1 month following your procedure if you are currently using oral contraceptives as your primary form of birth control. In addition to this, we recommend continuing your oral contraceptive as prescribed, unless otherwise instructed by your physician, during this time. Follow-up care is a key part of your treatment and safety. Be sure to make and go to all appointments, and call your doctor if you are having problems. It's also a good idea to know your test results and keep a list of the medicines you take. When should you call for help? Call 911 anytime you think you may need emergency care. For example, call if:  You passed out (lost consciousness). You cough up blood. You vomit blood or what looks like coffee grounds. You pass maroon or very bloody stools. Call your doctor now or seek immediate medical care if:  You have trouble swallowing. You have belly pain.   Your stools are black and tarlike or have streaks of blood. You are sick to your stomach or cannot keep fluids down. Watch closely for changes in your health, and be sure to contact your doctor if:  Your throat still hurts after a day or two. You do not get better as expected. After general anesthesia or intravenous sedation, for 24 hours or while taking prescription Narcotics:  Limit your activities  A responsible adult needs to be with you for the next 24 hours  Do not drive and operate hazardous machinery  Do not make important personal or business decisions  Do not drink alcoholic beverages  If you have not urinated within 8 hours after discharge, and you are experiencing discomfort from urinary retention, please go to the nearest ED. If you have sleep apnea and have a CPAP machine, please use it for all naps and sleeping. Please use caution when taking narcotics and any of your home medications that may cause drowsiness. *  Please give a list of your current medications to your Primary Care Provider. *  Please update this list whenever your medications are discontinued, doses are      changed, or new medications (including over-the-counter products) are added. *  Please carry medication information at all times in case of emergency situations. These are general instructions for a healthy lifestyle:  No smoking/ No tobacco products/ Avoid exposure to second hand smoke  Surgeon General's Warning:  Quitting smoking now greatly reduces serious risk to your health.   Obesity, smoking, and sedentary lifestyle greatly increases your risk for illness  A healthy diet, regular physical exercise & weight monitoring are important for maintaining a healthy lifestyle    You may be retaining fluid if you have a history of heart failure or if you experience any of the following symptoms:  Weight gain of 3 pounds or more overnight or 5 pounds in a week, increased swelling in our hands or feet or shortness of breath while lying flat in bed.  Please call your doctor as soon as you notice any of these symptoms; do not wait until your next office visit.

## 2022-07-28 NOTE — PROCEDURES
COLONOSCOPY    DATE of PROCEDURE: 7/28/2022    MEDICATION:  MAC      INSTRUMENT: PCF    PROCEDURE: After obtaining informed consent, the patient was placed in the left lateral position and sedated. The endoscope was advanced to the hepatic flexure. On withdrawal, the colon was carefully visualized in a 360 degree fashion. Retroflexion was performed in the rectum. The patient was taken to the recovery area in stable condition. FINDINGS:  Rectum: internal hemorrhoids and decreased anal tone  Sigmoid: Fair to poor prep that made visualization difficult. Diverticulosis  Descending Colon: Diverticulosis  Transverse Colon: 5mm polyp removed with cold snare. Ascending Colon: exam aborted at hepatic flexure sec to poor visibility as well as tortuosity. Cecum: not seen  Terminal ileum: not entered    Estimated blood loss: 0-minimal         ASSESSMENT/PLAN:  1.f/up pathology  2. Would likely avoid further colonoscopies given need for 2 day prep and risks with overall health status  3.  F/up in office with me 6 months      Jude Alas MD  [facility] [place] [facility]

## 2022-08-08 ENCOUNTER — NURSE ONLY (OUTPATIENT)
Dept: FAMILY MEDICINE CLINIC | Facility: CLINIC | Age: 77
End: 2022-08-08
Payer: MEDICARE

## 2022-08-08 DIAGNOSIS — E53.8 B12 DEFICIENCY: Primary | ICD-10-CM

## 2022-08-08 PROCEDURE — 96372 THER/PROPH/DIAG INJ SC/IM: CPT | Performed by: FAMILY MEDICINE

## 2022-08-08 RX ORDER — CYANOCOBALAMIN 1000 UG/ML
1000 INJECTION INTRAMUSCULAR; SUBCUTANEOUS ONCE
Status: COMPLETED | OUTPATIENT
Start: 2022-08-08 | End: 2022-08-08

## 2022-08-08 RX ADMIN — CYANOCOBALAMIN 1000 MCG: 1000 INJECTION INTRAMUSCULAR; SUBCUTANEOUS at 14:31

## 2022-08-08 NOTE — PROGRESS NOTES
Pt here for b12 injection. 1ml injected into the patients right deltoid. Pt tolerated well. Instructed to come back in 30 days.  Pt voiced understanding

## 2022-08-15 ENCOUNTER — OFFICE VISIT (OUTPATIENT)
Dept: FAMILY MEDICINE CLINIC | Facility: CLINIC | Age: 77
End: 2022-08-15
Payer: MEDICARE

## 2022-08-15 VITALS
SYSTOLIC BLOOD PRESSURE: 132 MMHG | DIASTOLIC BLOOD PRESSURE: 60 MMHG | BODY MASS INDEX: 25.4 KG/M2 | HEART RATE: 62 BPM | RESPIRATION RATE: 16 BRPM | OXYGEN SATURATION: 98 % | WEIGHT: 138 LBS | TEMPERATURE: 97.2 F | HEIGHT: 62 IN

## 2022-08-15 DIAGNOSIS — G89.4 CHRONIC PAIN SYNDROME: ICD-10-CM

## 2022-08-15 DIAGNOSIS — S00.03XD HEMATOMA OF SCALP, SUBSEQUENT ENCOUNTER: ICD-10-CM

## 2022-08-15 DIAGNOSIS — G89.29 CHRONIC BILATERAL LOW BACK PAIN WITHOUT SCIATICA: ICD-10-CM

## 2022-08-15 DIAGNOSIS — Z87.19 HISTORY OF ESOPHAGEAL STRICTURE: ICD-10-CM

## 2022-08-15 DIAGNOSIS — D12.5 ADENOMATOUS POLYP OF SIGMOID COLON: ICD-10-CM

## 2022-08-15 DIAGNOSIS — M54.50 CHRONIC BILATERAL LOW BACK PAIN WITHOUT SCIATICA: ICD-10-CM

## 2022-08-15 DIAGNOSIS — M05.9 RHEUMATOID ARTHRITIS WITH POSITIVE RHEUMATOID FACTOR, INVOLVING UNSPECIFIED SITE (HCC): ICD-10-CM

## 2022-08-15 DIAGNOSIS — E53.8 B12 DEFICIENCY: Primary | ICD-10-CM

## 2022-08-15 PROBLEM — N18.30 CHRONIC RENAL DISEASE, STAGE III (HCC): Status: RESOLVED | Noted: 2022-05-13 | Resolved: 2022-08-15

## 2022-08-15 PROCEDURE — G8417 CALC BMI ABV UP PARAM F/U: HCPCS | Performed by: FAMILY MEDICINE

## 2022-08-15 PROCEDURE — 1123F ACP DISCUSS/DSCN MKR DOCD: CPT | Performed by: FAMILY MEDICINE

## 2022-08-15 PROCEDURE — G8400 PT W/DXA NO RESULTS DOC: HCPCS | Performed by: FAMILY MEDICINE

## 2022-08-15 PROCEDURE — 99213 OFFICE O/P EST LOW 20 MIN: CPT | Performed by: FAMILY MEDICINE

## 2022-08-15 PROCEDURE — G8427 DOCREV CUR MEDS BY ELIG CLIN: HCPCS | Performed by: FAMILY MEDICINE

## 2022-08-15 PROCEDURE — 1090F PRES/ABSN URINE INCON ASSESS: CPT | Performed by: FAMILY MEDICINE

## 2022-08-15 PROCEDURE — 1036F TOBACCO NON-USER: CPT | Performed by: FAMILY MEDICINE

## 2022-08-15 RX ORDER — PREDNISONE 1 MG/1
5 TABLET ORAL DAILY
COMMUNITY

## 2022-08-15 RX ORDER — CYANOCOBALAMIN 1000 UG/ML
INJECTION INTRAMUSCULAR; SUBCUTANEOUS
Qty: 1 ML | Refills: 11 | Status: SHIPPED | OUTPATIENT
Start: 2022-08-15

## 2022-08-15 ASSESSMENT — PATIENT HEALTH QUESTIONNAIRE - PHQ9
SUM OF ALL RESPONSES TO PHQ9 QUESTIONS 1 & 2: 0
1. LITTLE INTEREST OR PLEASURE IN DOING THINGS: 0
SUM OF ALL RESPONSES TO PHQ QUESTIONS 1-9: 0
2. FEELING DOWN, DEPRESSED OR HOPELESS: 0

## 2022-08-15 NOTE — PROGRESS NOTES
[address] [facility]  [place]  Phone: [phone] Fax [phone]  Shannan Strong MD  8/15/2022           Ms. Zaynab Barraza  is a 67y.o.  year old  female patient who comes in for follow up. Her blood pressure was elevated 3 months ago. She does states she was in a lot of pain at her last visit but she is doing better. She does have rheumatoid arthritis and sees rheumatology regularly. She is on Orencia injections, prednisone 5 mg daily and uses Tylenol as needed. She did have some studies done on her back and is seeing pain management for that. She did hit her head some months ago. She has a little bump there on the side of her scalp she would like for me to check. She did have an MRI and a CT which did not show anything dangerous. She does need refills on her B12 injection medication. She did have an EGD and colonoscopy. They did stretch her esophagus again. They did remove a polyp as well. Ms. 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 Ms. Zaynab Barraza  has  has a past surgical history that includes Breast biopsy (Right, 10/30/2017); heent; heent; Cataract removal (Left); Hysterectomy (1980); esophgl balo distension dx std w/provocation; Colonoscopy (03/15/2017); Thyroidectomy (06/2005); [facility] STEREO BREAST BX W LOC DEVICE 1ST LESION RIGHT (Right, 10/30/2017); Upper gastrointestinal endoscopy (N/A, 7/28/2022); and Colonoscopy (N/A, 7/28/2022). Ms. Zaynab Barraza   Current Outpatient Medications   Medication Sig Dispense Refill    predniSONE (DELTASONE) 5 MG tablet Take 5 mg by mouth in the morning.       abatacept (ORENCIA) 250 MG injection Infuse intravenously once      cyanocobalamin 1000 MCG/ML injection INJECT 1 ML BY INTRAMUSCULAR ROUTE EVERY THIRTY (30) DAYS. 1 mL 11    levothyroxine (SYNTHROID) 50 MCG tablet Take 1 p.o. every other day alternating with the 75 mcg dose. levothyroxine (SYNTHROID) 75 MCG tablet One po every other day alternating with the 50 mcg dose      pantoprazole (PROTONIX) 40 MG tablet Take 40 mg by mouth daily       No current facility-administered medications for this visit. Ms. Colon Grade History     Socioeconomic History    Marital status:      Spouse name: None    Number of children: None    Years of education: None    Highest education level: None   Tobacco Use    Smoking status: Never    Smokeless tobacco: Never   Substance and Sexual Activity    Alcohol use: No    Drug use: No       Ms. Carmona   Family History   Problem Relation Age of Onset    Breast Cancer Neg Hx     Kidney Disease Mother     Heart Attack Father     Heart Disease Father             Ms. Rick John  has the following allergies: Allergies   Allergen Reactions    Diphenhydramine Other (See Comments)     Other reaction(s): Anaphylactic shock-Allergy    Lidocaine Other (See Comments)     jittery and lost voice; trembling; stuttering    Amoxicillin Other (See Comments)     stuttering    Atorvastatin Other (See Comments)     Other reaction(s): Myalgia-Intolerance    Famotidine Other (See Comments)     Not being able to talk  Other reaction(s): Other- (not listed) - Allergy  Not being able to talk    Hydrocodone-Acetaminophen Other (See Comments)     affected speech    Infliximab Other (See Comments)     messed up immune system    Methotrexate Other (See Comments)     messed up immune system  Other reaction(s): Other- (not listed) - Allergy  messed up immune system    Metronidazole Other (See Comments)     Jittery; loss of voice  Other reaction(s): Other- (not listed) - Allergy  Jittery; loss of voice    Omeprazole Other (See Comments)     effects speech  Other reaction(s):  Other- (not listed) - Allergy  effects speech    Sulindac

## 2022-09-13 ENCOUNTER — NURSE ONLY (OUTPATIENT)
Dept: FAMILY MEDICINE CLINIC | Facility: CLINIC | Age: 77
End: 2022-09-13
Payer: MEDICARE

## 2022-09-13 DIAGNOSIS — E53.8 B12 DEFICIENCY: Primary | ICD-10-CM

## 2022-09-13 PROCEDURE — 96372 THER/PROPH/DIAG INJ SC/IM: CPT | Performed by: FAMILY MEDICINE

## 2022-09-13 RX ORDER — CYANOCOBALAMIN 1000 UG/ML
1000 INJECTION INTRAMUSCULAR; SUBCUTANEOUS ONCE
Status: COMPLETED | OUTPATIENT
Start: 2022-09-13 | End: 2022-09-13

## 2022-09-13 RX ADMIN — CYANOCOBALAMIN 1000 MCG: 1000 INJECTION INTRAMUSCULAR; SUBCUTANEOUS at 13:05

## 2022-09-13 NOTE — PROGRESS NOTES
Pt was here for her B12 injection. Given in left deltoid. Pt will return in 1 month for next injection.

## 2022-10-14 ENCOUNTER — NURSE ONLY (OUTPATIENT)
Dept: FAMILY MEDICINE CLINIC | Facility: CLINIC | Age: 77
End: 2022-10-14
Payer: MEDICARE

## 2022-10-14 DIAGNOSIS — E53.8 B12 DEFICIENCY: Primary | ICD-10-CM

## 2022-10-14 PROCEDURE — 96372 THER/PROPH/DIAG INJ SC/IM: CPT | Performed by: FAMILY MEDICINE

## 2022-10-14 RX ORDER — CYANOCOBALAMIN 1000 UG/ML
1000 INJECTION INTRAMUSCULAR; SUBCUTANEOUS ONCE
Status: COMPLETED | OUTPATIENT
Start: 2022-10-14 | End: 2022-10-14

## 2022-10-14 RX ADMIN — CYANOCOBALAMIN 1000 MCG: 1000 INJECTION INTRAMUSCULAR; SUBCUTANEOUS at 14:36

## 2022-10-14 NOTE — PROGRESS NOTES
Pt in office today for B12 injection. 1 mL of B12 was injected into right deltoid. Pt tolerated well.
88 year old female with h/o HTN, HLD, hypothyroidism, osteoporosis- c/o rectal pain worse with bowel movements x 6 months. Pt had surgical consult- for rectal examination under anesthesia on 06/08/2017.

## 2022-10-28 ENCOUNTER — TELEPHONE (OUTPATIENT)
Dept: FAMILY MEDICINE CLINIC | Facility: CLINIC | Age: 77
End: 2022-10-28

## 2022-10-28 DIAGNOSIS — E78.00 HYPERCHOLESTEROLEMIA: ICD-10-CM

## 2022-10-28 DIAGNOSIS — E03.9 ACQUIRED HYPOTHYROIDISM: Primary | ICD-10-CM

## 2022-10-28 DIAGNOSIS — E53.8 B12 DEFICIENCY: ICD-10-CM

## 2022-10-28 DIAGNOSIS — Z79.899 ENCOUNTER FOR MEDICATION MANAGEMENT: ICD-10-CM

## 2022-11-01 ENCOUNTER — NURSE TRIAGE (OUTPATIENT)
Dept: OTHER | Facility: CLINIC | Age: 77
End: 2022-11-01

## 2022-11-01 ENCOUNTER — OFFICE VISIT (OUTPATIENT)
Dept: FAMILY MEDICINE CLINIC | Facility: CLINIC | Age: 77
End: 2022-11-01
Payer: MEDICARE

## 2022-11-01 VITALS — TEMPERATURE: 97.9 F | BODY MASS INDEX: 25.03 KG/M2 | RESPIRATION RATE: 16 BRPM | WEIGHT: 136 LBS | HEIGHT: 62 IN

## 2022-11-01 DIAGNOSIS — R05.1 ACUTE COUGH: ICD-10-CM

## 2022-11-01 DIAGNOSIS — R50.9 FEVER AND CHILLS: ICD-10-CM

## 2022-11-01 DIAGNOSIS — K21.9 GASTROESOPHAGEAL REFLUX DISEASE WITHOUT ESOPHAGITIS: ICD-10-CM

## 2022-11-01 DIAGNOSIS — J10.1 INFLUENZA A: Primary | ICD-10-CM

## 2022-11-01 LAB
INFLUENZA A ANTIGEN, POC: POSITIVE
INFLUENZA B ANTIGEN, POC: NEGATIVE
VALID INTERNAL CONTROL, POC: NORMAL

## 2022-11-01 PROCEDURE — 87804 INFLUENZA ASSAY W/OPTIC: CPT | Performed by: FAMILY MEDICINE

## 2022-11-01 PROCEDURE — 1123F ACP DISCUSS/DSCN MKR DOCD: CPT | Performed by: FAMILY MEDICINE

## 2022-11-01 PROCEDURE — G8400 PT W/DXA NO RESULTS DOC: HCPCS | Performed by: FAMILY MEDICINE

## 2022-11-01 PROCEDURE — G8484 FLU IMMUNIZE NO ADMIN: HCPCS | Performed by: FAMILY MEDICINE

## 2022-11-01 PROCEDURE — 1090F PRES/ABSN URINE INCON ASSESS: CPT | Performed by: FAMILY MEDICINE

## 2022-11-01 PROCEDURE — G8420 CALC BMI NORM PARAMETERS: HCPCS | Performed by: FAMILY MEDICINE

## 2022-11-01 PROCEDURE — G8427 DOCREV CUR MEDS BY ELIG CLIN: HCPCS | Performed by: FAMILY MEDICINE

## 2022-11-01 PROCEDURE — 1036F TOBACCO NON-USER: CPT | Performed by: FAMILY MEDICINE

## 2022-11-01 PROCEDURE — 99214 OFFICE O/P EST MOD 30 MIN: CPT | Performed by: FAMILY MEDICINE

## 2022-11-01 RX ORDER — ESOMEPRAZOLE MAGNESIUM 40 MG/1
40 CAPSULE, DELAYED RELEASE ORAL DAILY
Qty: 30 CAPSULE | Refills: 11 | Status: SHIPPED | OUTPATIENT
Start: 2022-11-01 | End: 2022-11-14

## 2022-11-01 RX ORDER — OSELTAMIVIR PHOSPHATE 75 MG/1
CAPSULE ORAL
Qty: 10 CAPSULE | Refills: 0 | Status: SHIPPED | OUTPATIENT
Start: 2022-11-01 | End: 2022-11-14 | Stop reason: ALTCHOICE

## 2022-11-01 RX ORDER — BENZONATATE 100 MG/1
CAPSULE ORAL
Qty: 20 CAPSULE | Refills: 0 | Status: SHIPPED | OUTPATIENT
Start: 2022-11-01

## 2022-11-01 ASSESSMENT — PATIENT HEALTH QUESTIONNAIRE - PHQ9
SUM OF ALL RESPONSES TO PHQ QUESTIONS 1-9: 0
SUM OF ALL RESPONSES TO PHQ QUESTIONS 1-9: 0
SUM OF ALL RESPONSES TO PHQ9 QUESTIONS 1 & 2: 0
SUM OF ALL RESPONSES TO PHQ QUESTIONS 1-9: 0
SUM OF ALL RESPONSES TO PHQ QUESTIONS 1-9: 0
1. LITTLE INTEREST OR PLEASURE IN DOING THINGS: 0
2. FEELING DOWN, DEPRESSED OR HOPELESS: 0

## 2022-11-01 NOTE — PROGRESS NOTES
[address] [facility]  [place], [address]  Phone: [phone] Fax [phone]  Alexander Varela MD  11/1/2022         Ms. Jefry Goel is a 65 y.o. female patient who comes in for low grade fevers, chest congestion, chest pain during cough, dry cough, headache, myalgias, nasal blockage, post nasal drip, sinus and nasal congestion, and sneezing . It has been going on for 2 days. It is getting worse. Has tried over the counter medicines without success. She is able to keep food and fluids down though she has had some diarrhea. She is staying well-hydrated. She was wondering about doing something other than Protonix for her reflux as she is having heartburn.     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 93/6/5525    GERD (gastroesophageal reflux disease)     no meds    Goiter     managed with medications    Hypercholesterolemia 10/1/2012    PUD (peptic ulcer disease) 10/1/2012    RA (rheumatoid arthritis) (HCC)     Reflux 10/1/2012    Seizures ([address]     from benadryl    Sleep apnea     no CPAP       Past Surgical History:   Procedure Laterality Date    BREAST BIOPSY Right 10/30/2017    benign    CATARACT REMOVAL Left     COLONOSCOPY  03/15/2017    Dr Farrah Raphael    COLONOSCOPY N/A 7/28/2022    COLONOSCOPY/ POLYPECTOMY performed by Bhumi Garcia MD at [address] [address] [address] DX STD W/PROVOCATION      11/12/18    HEENT      throat stretched Dr. Isaak Marsh      thyroid surgery [facility])  1980    secondary to bleeding; fibroids    MAHAMED STEROTACTIC LOC BREAST BIOPSY RIGHT Right 10/30/2017    MAHAMED STEROTACTIC LOC BREAST BIOPSY RIGHT 10/30/2017 [facility]    THYROIDECTOMY  06/2005    Dr. Gabriel Perry- partial    UPPER GASTROINTESTINAL ENDOSCOPY N/A 7/28/2022    EGD ESOPHAGOGASTRODUODENOSCOPY/ DILATATION performed by Bhumi Garcia MD at [address] [place] [address]       Current Outpatient Medications Medication Sig Dispense Refill    benzonatate (TESSALON PERLES) 100 MG capsule One po tid prn cough 20 capsule 0    oseltamivir (TAMIFLU) 75 MG capsule One po bid x 5 days 10 capsule 0    esomeprazole (NEXIUM) 40 MG delayed release capsule Take 1 capsule by mouth daily 30 capsule 11    predniSONE (DELTASONE) 5 MG tablet Take 5 mg by mouth in the morning. abatacept (ORENCIA) 250 MG injection Infuse intravenously once      cyanocobalamin 1000 MCG/ML injection INJECT 1 ML BY INTRAMUSCULAR ROUTE EVERY THIRTY (30) DAYS. 1 mL 11    levothyroxine (SYNTHROID) 50 MCG tablet Take 1 p.o. every other day alternating with the 75 mcg dose. levothyroxine (SYNTHROID) 75 MCG tablet One po every other day alternating with the 50 mcg dose      pantoprazole (PROTONIX) 40 MG tablet Take 40 mg by mouth daily       No current facility-administered medications for this visit. Family History   Problem Relation Age of Onset    Breast Cancer Neg Hx     Kidney Disease Mother     Heart Attack Father     Heart Disease Father        Social History     Socioeconomic History    Marital status:      Spouse name: Not on file    Number of children: Not on file    Years of education: Not on file    Highest education level: Not on file   Occupational History    Not on file   Tobacco Use    Smoking status: Never    Smokeless tobacco: Never   Substance and Sexual Activity    Alcohol use: No    Drug use: No    Sexual activity: Not on file   Other Topics Concern    Not on file   Social History Narrative    Not on file     Social Determinants of Health     Financial Resource Strain: Not on file   Food Insecurity: Not on file   Transportation Needs: Not on file   Physical Activity: Not on file   Stress: Not on file   Social Connections: Not on file   Intimate Partner Violence: Not on file   Housing Stability: Not on file       Allergies   Allergen Reactions    Diphenhydramine Other (See Comments)     Other reaction(s):  Anaphylactic shock-Allergy    Lidocaine Other (See Comments)     jittery and lost voice; trembling; stuttering    Amoxicillin Other (See Comments)     stuttering    Atorvastatin Other (See Comments)     Other reaction(s): Myalgia-Intolerance    Famotidine Other (See Comments)     Not being able to talk  Other reaction(s): Other- (not listed) - Allergy  Not being able to talk    Hydrocodone-Acetaminophen Other (See Comments)     affected speech    Infliximab Other (See Comments)     messed up immune system    Methotrexate Other (See Comments)     messed up immune system  Other reaction(s): Other- (not listed) - Allergy  messed up immune system    Metronidazole Other (See Comments)     Jittery; loss of voice  Other reaction(s): Other- (not listed) - Allergy  Jittery; loss of voice    Omeprazole Other (See Comments)     effects speech  Other reaction(s): Other- (not listed) - Allergy  effects speech    Sulindac Hives     Other reaction(s): Rash-Allergy       Temp 97.9 °F (36.6 °C)   Resp 16   Ht 5' 2\" (1.575 m)   Wt 136 lb (61.7 kg)   BMI 24.87 kg/m²     HEENT: Normocephalic, atraumatic, pupils equal and reactive to light and accommodation, tympanic membranes intact without erythema or edema, oropharynx clear with no erythema or tonsillar exudate  Neck :supple, no masses, no thyromegaly, no lymphadenopathy  Lungs: Coarse breath sounds but no wheezing bilaterally  CV: regular rate and rhythm, without murmurs, rubs, or gallops    Flu test: positive for type A    COVID test: Negative    No results found for this visit on 11/01/22. Harshal Abdullahi was seen today for influenza and cough. Diagnoses and all orders for this visit:    Influenza A  -     benzonatate (TESSALON PERLES) 100 MG capsule; One po tid prn cough  -     oseltamivir (TAMIFLU) 75 MG capsule; One po bid x 5 days    Fever and chills  -     AMB POC RAPID INFLUENZA TEST    Acute cough  -     benzonatate (TESSALON PERLES) 100 MG capsule;  One po tid prn cough    Gastroesophageal reflux disease without esophagitis  -     esomeprazole (NEXIUM) 40 MG delayed release capsule; Take 1 capsule by mouth daily    Follow-up Friday to ensure that she is improving. Nexium rather than Protonix.   Neal Araujo MD

## 2022-11-01 NOTE — TELEPHONE ENCOUNTER
Location of patient: [facility]     Received call from [facility] at [facility] with [employer]. Subjective: Caller states \"I have the flu or something\" cough is frequent,non productive, short of breath with coughing,chills     Current Symptoms: chest hurts with cough,cough is very frequent while talking on the phone,cough is non productive,wheezing at times    Onset: Sunday     Associated Symptoms: NA    Pain Severity: Denies     Temperature: Denies, did not check temp     What has been tried: Cough syrup    LMP: NA Pregnant: No    Recommended disposition: Go to Office Now,be seen within the next 4 hours. Care advice provided, patient verbalizes understanding; denies any other questions or concerns; instructed to call back for any new or worsening symptoms. Patient/Caller agrees with recommended disposition; writer provided warm transfer to Anton Garcia at [facility] for appointment scheduling    Attention Provider: Thank you for allowing me to participate in the care of your patient. The patient was connected to triage in response to information provided to the ECC/PSC. Please do not respond through this encounter as the response is not directed to a shared pool.      Reason for Disposition   Wheezing is present    Protocols used: Cough-ADULT-OH

## 2022-11-04 ENCOUNTER — OFFICE VISIT (OUTPATIENT)
Dept: FAMILY MEDICINE CLINIC | Facility: CLINIC | Age: 77
End: 2022-11-04
Payer: MEDICARE

## 2022-11-04 VITALS
SYSTOLIC BLOOD PRESSURE: 138 MMHG | WEIGHT: 136 LBS | BODY MASS INDEX: 25.03 KG/M2 | HEIGHT: 62 IN | HEART RATE: 63 BPM | DIASTOLIC BLOOD PRESSURE: 60 MMHG | RESPIRATION RATE: 16 BRPM | OXYGEN SATURATION: 97 % | TEMPERATURE: 97.3 F

## 2022-11-04 DIAGNOSIS — J10.1 INFLUENZA A: Primary | ICD-10-CM

## 2022-11-04 DIAGNOSIS — R05.1 ACUTE COUGH: ICD-10-CM

## 2022-11-04 PROCEDURE — 1123F ACP DISCUSS/DSCN MKR DOCD: CPT | Performed by: FAMILY MEDICINE

## 2022-11-04 PROCEDURE — 99213 OFFICE O/P EST LOW 20 MIN: CPT | Performed by: FAMILY MEDICINE

## 2022-11-04 PROCEDURE — G8484 FLU IMMUNIZE NO ADMIN: HCPCS | Performed by: FAMILY MEDICINE

## 2022-11-04 PROCEDURE — 1090F PRES/ABSN URINE INCON ASSESS: CPT | Performed by: FAMILY MEDICINE

## 2022-11-04 PROCEDURE — G8400 PT W/DXA NO RESULTS DOC: HCPCS | Performed by: FAMILY MEDICINE

## 2022-11-04 PROCEDURE — G8420 CALC BMI NORM PARAMETERS: HCPCS | Performed by: FAMILY MEDICINE

## 2022-11-04 PROCEDURE — 1036F TOBACCO NON-USER: CPT | Performed by: FAMILY MEDICINE

## 2022-11-04 PROCEDURE — G8427 DOCREV CUR MEDS BY ELIG CLIN: HCPCS | Performed by: FAMILY MEDICINE

## 2022-11-04 RX ORDER — PROMETHAZINE HYDROCHLORIDE AND CODEINE PHOSPHATE 6.25; 1 MG/5ML; MG/5ML
5 SYRUP ORAL NIGHTLY PRN
Qty: 30 ML | Refills: 0 | Status: SHIPPED | OUTPATIENT
Start: 2022-11-04 | End: 2022-11-07

## 2022-11-04 NOTE — PROGRESS NOTES
[address] [facility]  [place]  Phone: [phone] Fax [phone]  Hansel Pope MD  11/4/2022           Ms. Ranjeet Parker  is a 65y.o.  year old  female patient who comes in complaining of new cough. She was diagnosed with the flu 2 days ago. She was really sick then. She does feel much better after getting on Tamiflu and Tessalon Perles. It is hard to sleep low and she is taking codeine cough syrup with good relief in the past.  She is able to eat and drink and is urinating throughout the day. She overall feels much better. Ms. 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 Ms. Ranjeet Parker  has  has a past surgical history that includes Breast biopsy (Right, 10/30/2017); heent; heent; Cataract removal (Left); Hysterectomy (1980); esophgl balo distension dx std w/provocation; Colonoscopy (03/15/2017); Thyroidectomy (06/2005); [facility] STEREO BREAST BX W LOC DEVICE 1ST LESION RIGHT (Right, 10/30/2017); Upper gastrointestinal endoscopy (N/A, 7/28/2022); and Colonoscopy (N/A, 7/28/2022). Ms. Ranjeet Parker   Current Outpatient Medications   Medication Sig Dispense Refill    promethazine-codeine (PHENERGAN WITH CODEINE) 6.25-10 MG/5ML syrup Take 5 mLs by mouth nightly as needed for Cough for up to 3 days. 30 mL 0    benzonatate (TESSALON PERLES) 100 MG capsule One po tid prn cough 20 capsule 0    oseltamivir (TAMIFLU) 75 MG capsule One po bid x 5 days 10 capsule 0    esomeprazole (NEXIUM) 40 MG delayed release capsule Take 1 capsule by mouth daily 30 capsule 11    predniSONE (DELTASONE) 5 MG tablet Take 5 mg by mouth in the morning.       abatacept (ORENCIA) 250 MG injection Infuse intravenously once      cyanocobalamin 1000 MCG/ML injection INJECT 1 ML BY speech    Sulindac Hives     Other reaction(s): Rash-Allergy       /60   Pulse 63   Temp 97.3 °F (36.3 °C)   Resp 16   Ht 5' 2\" (1.575 m)   Wt 136 lb (61.7 kg)   SpO2 97%   BMI 24.87 kg/m²     HEENT: Normocephalic, atraumatic, pupils equal and reactive to light. Neck: Supple, no masses or thyromegaly. Lungs: clear to auscultation bilaterally. CV: regular rate and rhythm, without murmurs, rubs, or gallops  Ext: No lower extremity edema. Lisa Nelson was seen today for follow-up and influenza. Diagnoses and all orders for this visit:    Influenza A    Acute cough  -     promethazine-codeine (PHENERGAN WITH CODEINE) 6.25-10 MG/5ML syrup; Take 5 mLs by mouth nightly as needed for Cough for up to 3 days. Codeine cough syrup just at night. Continue Tamiflu. Notify us immediately for worsening.   Kenyatta Rg MD

## 2022-11-07 ENCOUNTER — NURSE ONLY (OUTPATIENT)
Dept: FAMILY MEDICINE CLINIC | Facility: CLINIC | Age: 77
End: 2022-11-07

## 2022-11-07 ENCOUNTER — TELEPHONE (OUTPATIENT)
Dept: FAMILY MEDICINE CLINIC | Facility: CLINIC | Age: 77
End: 2022-11-07

## 2022-11-07 DIAGNOSIS — E78.00 HYPERCHOLESTEROLEMIA: ICD-10-CM

## 2022-11-07 DIAGNOSIS — E03.9 ACQUIRED HYPOTHYROIDISM: ICD-10-CM

## 2022-11-07 DIAGNOSIS — E53.8 B12 DEFICIENCY: ICD-10-CM

## 2022-11-07 DIAGNOSIS — Z79.899 ENCOUNTER FOR MEDICATION MANAGEMENT: ICD-10-CM

## 2022-11-07 LAB
ALBUMIN SERPL-MCNC: 2.7 G/DL (ref 3.2–4.6)
ALBUMIN/GLOB SERPL: 0.9 {RATIO} (ref 0.4–1.6)
ALP SERPL-CCNC: 76 U/L (ref 50–136)
ALT SERPL-CCNC: 31 U/L (ref 12–65)
ANION GAP SERPL CALC-SCNC: 6 MMOL/L (ref 2–11)
AST SERPL-CCNC: 25 U/L (ref 15–37)
BILIRUB SERPL-MCNC: 0.4 MG/DL (ref 0.2–1.1)
BUN SERPL-MCNC: 21 MG/DL (ref 8–23)
CALCIUM SERPL-MCNC: 9.1 MG/DL (ref 8.3–10.4)
CHLORIDE SERPL-SCNC: 115 MMOL/L (ref 101–110)
CHOLEST SERPL-MCNC: 188 MG/DL
CO2 SERPL-SCNC: 22 MMOL/L (ref 21–32)
CREAT SERPL-MCNC: 1.2 MG/DL (ref 0.6–1)
GLOBULIN SER CALC-MCNC: 3 G/DL (ref 2.8–4.5)
GLUCOSE SERPL-MCNC: 83 MG/DL (ref 65–100)
HDLC SERPL-MCNC: 60 MG/DL (ref 40–60)
HDLC SERPL: 3.1 {RATIO}
LDLC SERPL CALC-MCNC: 105.8 MG/DL
POTASSIUM SERPL-SCNC: 4 MMOL/L (ref 3.5–5.1)
PROT SERPL-MCNC: 5.7 G/DL (ref 6.3–8.2)
SODIUM SERPL-SCNC: 143 MMOL/L (ref 133–143)
TRIGL SERPL-MCNC: 111 MG/DL (ref 35–150)
TSH, 3RD GENERATION: 0.89 UIU/ML (ref 0.36–3.74)
VIT B12 SERPL-MCNC: 2474 PG/ML (ref 193–986)
VLDLC SERPL CALC-MCNC: 22.2 MG/DL (ref 6–23)

## 2022-11-14 ENCOUNTER — OFFICE VISIT (OUTPATIENT)
Dept: FAMILY MEDICINE CLINIC | Facility: CLINIC | Age: 77
End: 2022-11-14
Payer: MEDICARE

## 2022-11-14 VITALS
DIASTOLIC BLOOD PRESSURE: 67 MMHG | BODY MASS INDEX: 25.03 KG/M2 | WEIGHT: 136 LBS | HEIGHT: 62 IN | SYSTOLIC BLOOD PRESSURE: 147 MMHG | OXYGEN SATURATION: 97 % | TEMPERATURE: 98.4 F | RESPIRATION RATE: 16 BRPM | HEART RATE: 71 BPM

## 2022-11-14 DIAGNOSIS — J40 BRONCHITIS: ICD-10-CM

## 2022-11-14 DIAGNOSIS — Z78.0 MENOPAUSE: ICD-10-CM

## 2022-11-14 DIAGNOSIS — E78.00 HYPERCHOLESTEROLEMIA: ICD-10-CM

## 2022-11-14 DIAGNOSIS — E53.8 B12 DEFICIENCY: ICD-10-CM

## 2022-11-14 DIAGNOSIS — K27.9 PUD (PEPTIC ULCER DISEASE): ICD-10-CM

## 2022-11-14 DIAGNOSIS — N18.31 CHRONIC RENAL FAILURE, STAGE 3A (HCC): ICD-10-CM

## 2022-11-14 DIAGNOSIS — Z23 NEED FOR IMMUNIZATION AGAINST INFLUENZA: Primary | ICD-10-CM

## 2022-11-14 DIAGNOSIS — E03.9 ACQUIRED HYPOTHYROIDISM: ICD-10-CM

## 2022-11-14 DIAGNOSIS — K21.00 GASTROESOPHAGEAL REFLUX DISEASE WITH ESOPHAGITIS WITHOUT HEMORRHAGE: ICD-10-CM

## 2022-11-14 DIAGNOSIS — M05.9 RHEUMATOID ARTHRITIS WITH POSITIVE RHEUMATOID FACTOR, INVOLVING UNSPECIFIED SITE (HCC): ICD-10-CM

## 2022-11-14 DIAGNOSIS — Z00.00 MEDICARE ANNUAL WELLNESS VISIT, SUBSEQUENT: ICD-10-CM

## 2022-11-14 PROCEDURE — 90694 VACC AIIV4 NO PRSRV 0.5ML IM: CPT | Performed by: FAMILY MEDICINE

## 2022-11-14 PROCEDURE — 99213 OFFICE O/P EST LOW 20 MIN: CPT | Performed by: FAMILY MEDICINE

## 2022-11-14 PROCEDURE — 1090F PRES/ABSN URINE INCON ASSESS: CPT | Performed by: FAMILY MEDICINE

## 2022-11-14 PROCEDURE — 1123F ACP DISCUSS/DSCN MKR DOCD: CPT | Performed by: FAMILY MEDICINE

## 2022-11-14 PROCEDURE — G0008 ADMIN INFLUENZA VIRUS VAC: HCPCS | Performed by: FAMILY MEDICINE

## 2022-11-14 PROCEDURE — G8427 DOCREV CUR MEDS BY ELIG CLIN: HCPCS | Performed by: FAMILY MEDICINE

## 2022-11-14 PROCEDURE — G8484 FLU IMMUNIZE NO ADMIN: HCPCS | Performed by: FAMILY MEDICINE

## 2022-11-14 PROCEDURE — G0439 PPPS, SUBSEQ VISIT: HCPCS | Performed by: FAMILY MEDICINE

## 2022-11-14 PROCEDURE — G8400 PT W/DXA NO RESULTS DOC: HCPCS | Performed by: FAMILY MEDICINE

## 2022-11-14 PROCEDURE — 1036F TOBACCO NON-USER: CPT | Performed by: FAMILY MEDICINE

## 2022-11-14 PROCEDURE — G8420 CALC BMI NORM PARAMETERS: HCPCS | Performed by: FAMILY MEDICINE

## 2022-11-14 RX ORDER — HYDROCODONE POLISTIREX AND CHLORPHENIRAMINE POLISTIREX 10; 8 MG/5ML; MG/5ML
SUSPENSION, EXTENDED RELEASE ORAL
Qty: 90 ML | Refills: 0 | Status: SHIPPED | OUTPATIENT
Start: 2022-11-14 | End: 2022-12-08

## 2022-11-14 RX ORDER — LEVOTHYROXINE SODIUM 0.07 MG/1
75 TABLET ORAL DAILY
Qty: 90 TABLET | Refills: 3 | Status: SHIPPED | OUTPATIENT
Start: 2022-11-14

## 2022-11-14 RX ORDER — LEVOTHYROXINE SODIUM 0.05 MG/1
TABLET ORAL
Qty: 90 TABLET | Refills: 3 | Status: SHIPPED | OUTPATIENT
Start: 2022-11-14

## 2022-11-14 RX ORDER — PANTOPRAZOLE SODIUM 40 MG/1
40 TABLET, DELAYED RELEASE ORAL DAILY
Qty: 90 TABLET | Refills: 3 | Status: SHIPPED | OUTPATIENT
Start: 2022-11-14

## 2022-11-14 RX ORDER — AZITHROMYCIN 250 MG/1
TABLET, FILM COATED ORAL
Qty: 1 PACKET | Refills: 0 | Status: SHIPPED | OUTPATIENT
Start: 2022-11-14

## 2022-11-14 ASSESSMENT — PATIENT HEALTH QUESTIONNAIRE - PHQ9
1. LITTLE INTEREST OR PLEASURE IN DOING THINGS: 1
SUM OF ALL RESPONSES TO PHQ QUESTIONS 1-9: 2
SUM OF ALL RESPONSES TO PHQ QUESTIONS 1-9: 2
2. FEELING DOWN, DEPRESSED OR HOPELESS: 1
SUM OF ALL RESPONSES TO PHQ QUESTIONS 1-9: 2
SUM OF ALL RESPONSES TO PHQ QUESTIONS 1-9: 2
SUM OF ALL RESPONSES TO PHQ9 QUESTIONS 1 & 2: 2

## 2022-11-14 ASSESSMENT — LIFESTYLE VARIABLES
HOW OFTEN DO YOU HAVE A DRINK CONTAINING ALCOHOL: NEVER
HOW MANY STANDARD DRINKS CONTAINING ALCOHOL DO YOU HAVE ON A TYPICAL DAY: PATIENT DOES NOT DRINK

## 2022-11-14 NOTE — PATIENT INSTRUCTIONS
Personalized Preventive Plan for [facility] - 11/14/2022  Medicare offers a range of preventive health benefits. Some of the tests and screenings are paid in full while other may be subject to a deductible, co-insurance, and/or copay. Some of these benefits include a comprehensive review of your medical history including lifestyle, illnesses that may run in your family, and various assessments and screenings as appropriate. After reviewing your medical record and screening and assessments performed today your provider may have ordered immunizations, labs, imaging, and/or referrals for you. A list of these orders (if applicable) as well as your Preventive Care list are included within your After Visit Summary for your review. Other Preventive Recommendations:    A preventive eye exam performed by an eye specialist is recommended every 1-2 years to screen for glaucoma; cataracts, macular degeneration, and other eye disorders. A preventive dental visit is recommended every 6 months. Try to get at least 150 minutes of exercise per week or 10,000 steps per day on a pedometer . Order or download the FREE \"Exercise & Physical Activity: Your Everyday Guide\" from [employer]. Call 4-919.690.1851 or search [employer] online. You need 4856-7852 mg of calcium and 3763-7776 IU of vitamin D per day. It is possible to meet your calcium requirement with diet alone, but a vitamin D supplement is usually necessary to meet this goal.  When exposed to the sun, use a sunscreen that protects against both UVA and UVB radiation with an SPF of 30 or greater. Reapply every 2 to 3 hours or after sweating, drying off with a towel, or swimming. Always wear a seat belt when traveling in a car. Always wear a helmet when riding a bicycle or motorcycle.

## 2022-11-14 NOTE — PROGRESS NOTES
Medicare Annual Wellness Visit    Mary Quiroz is here for Medicare AWV    Assessment & Plan   Medicare annual wellness visit, subsequent    Recommendations for Preventive Services Due: see orders and patient instructions/AVS.  Recommended screening schedule for the next 5-10 years is provided to the patient in written form: see Patient Instructions/AVS.     Return for Medicare Annual Wellness Visit in 1 year. Subjective   The following acute and/or chronic problems were also addressed today:  She also continues to have a very bad cough after having had the flu. The pharmacy could not get the cough syrup that we had sent in for her. She cannot stop coughing. She does not feel well. Lungs are clear though distant. We will try Z-Jeanmarie and codeine cough syrup. Go over her blood work today and it does show some decreased renal function which we will recheck in 3 months. Continue on her current dose of thyroid medication. Be sure to follow-up with her rheumatologist.  Follow-up in 3 months for labs. Take the Protonix regularly for reflux. B12 is a little high so she is to stop her B12 injection for 1 month. Patient's complete Health Risk Assessment and screening values have been reviewed and are found in Flowsheets. The following problems were reviewed today and where indicated follow up appointments were made and/or referrals ordered.     Positive Risk Factor Screenings with Interventions:    Fall Risk:  Do you feel unsteady or are you worried about falling? : (!) yes  2 or more falls in past year?: no  Fall with injury in past year?: no   Fall Risk Interventions:    Home safety tips provided            General Health and ACP:  General  In general, how would you say your health is?: Good  In the past 7 days, have you experienced any of the following: New or Increased Pain, New or Increased Fatigue, Loneliness, Social Isolation, Stress or Anger?: (!) Yes  Select all that apply: (!) New or Increased Pain  Do you get the social and emotional support that you need?: Yes  Do you have a Living Will?: (!) No    Advance Directives       Power of Zain Mercer Will ACP-Advance Directive ACP-Power of     Not on File Not on File Not on File Not on File          General Health Risk Interventions:  No Living Will: Advance Care Planning addressed with patient today      Safety:  Do you have working smoke detectors?: Yes  Do you have any tripping hazards - loose or unsecured carpets or rugs?: No  Do you have any tripping hazards - clutter in doorways, halls, or stairs?: No  Do you have either shower bars, grab bars, non-slip mats or non-slip surfaces in your shower or bathtub?: Yes  Do all of your stairways have a railing or banister?: (!) No  Do you always fasten your seatbelt when you are in a car?: Yes  Safety Interventions:  Home safety tips provided    ADLs:  In the past 7 days, did you need help from others to perform any of the following everyday activities: Eating, dressing, grooming, bathing, toileting, or walking/balance?: (!) Yes  Select all that apply: (!) Walking/Balance  In the past 7 days, did you need help from others to take care of any of the following: Laundry, housekeeping, banking/finances, shopping, telephone use, food preparation, transportation, or taking medications?: (!) Yes  Select all that apply: (!) Housekeeping  ADL Interventions:    Has a           Objective   Vitals:    11/14/22 1011   BP: (!) 147/67   Pulse: 71   Resp: 16   Temp: 98.4 °F (36.9 °C)   SpO2: 97%   Weight: 136 lb (61.7 kg)   Height: 5' 2\" (1.575 m)      Body mass index is 24.87 kg/m².       Neck: neck supple and non tender without mass, no thyromegaly or thyroid nodules, no cervical lymphadenopathy   Pulmonary/Chest: clear to auscultation bilaterally- no wheezes, rales or rhonchi, normal air movement, no respiratory distress  Cardiovascular: normal rate, normal S1 and S2, and no gallops       Allergies Allergen Reactions    Diphenhydramine Other (See Comments)     Other reaction(s): Anaphylactic shock-Allergy    Lidocaine Other (See Comments)     jittery and lost voice; trembling; stuttering    Amoxicillin Other (See Comments)     stuttering    Atorvastatin Other (See Comments)     Other reaction(s): Myalgia-Intolerance    Famotidine Other (See Comments)     Not being able to talk  Other reaction(s): Other- (not listed) - Allergy  Not being able to talk    Hydrocodone-Acetaminophen Other (See Comments)     affected speech    Infliximab Other (See Comments)     messed up immune system    Methotrexate Other (See Comments)     messed up immune system  Other reaction(s): Other- (not listed) - Allergy  messed up immune system    Metronidazole Other (See Comments)     Jittery; loss of voice  Other reaction(s): Other- (not listed) - Allergy  Jittery; loss of voice    Omeprazole Other (See Comments)     effects speech  Other reaction(s): Other- (not listed) - Allergy  effects speech    Sulindac Hives     Other reaction(s): Rash-Allergy     Prior to Visit Medications    Medication Sig Taking? Authorizing Provider   benzonatate (TESSALON PERLES) 100 MG capsule One po tid prn cough Yes Jennifer Stevens MD   esomeprazole (NEXIUM) 40 MG delayed release capsule Take 1 capsule by mouth daily Yes Jennifer Stevens MD   abatacept (ORENCIA) 250 MG injection Infuse intravenously once Yes Historical Provider, MD   cyanocobalamin 1000 MCG/ML injection INJECT 1 ML BY INTRAMUSCULAR ROUTE EVERY THIRTY (30) DAYS. Yes Jennifer Stevens MD   levothyroxine (SYNTHROID) 50 MCG tablet Take 1 p.o. every other day alternating with the 75 mcg dose.  Yes Ar Automatic Reconciliation   levothyroxine (SYNTHROID) 75 MCG tablet One po every other day alternating with the 50 mcg dose Yes Ar Automatic Reconciliation   pantoprazole (PROTONIX) 40 MG tablet Take 40 mg by mouth daily Yes Ar Automatic Reconciliation       CareTeam (Including outside providers/suppliers regularly involved in providing care):   Patient Care Team:  Carina Black MD as PCP - Izabela Scherer MD as PCP - [facility] Empaneled Provider     Reviewed and updated this visit:  Tobacco  Allergies  Meds  Problems  Med Hx  Surg Hx  Soc Hx  Fam Hx

## 2023-01-05 ENCOUNTER — HOSPITAL ENCOUNTER (OUTPATIENT)
Dept: MAMMOGRAPHY | Age: 78
Discharge: HOME | End: 2023-01-05
Payer: MEDICARE

## 2023-01-05 ENCOUNTER — APPOINTMENT (OUTPATIENT)
Dept: MAMMOGRAPHY | Age: 78
End: 2023-01-05
Payer: MEDICARE

## 2023-01-05 DIAGNOSIS — Z78.0 MENOPAUSE: ICD-10-CM

## 2023-01-05 DIAGNOSIS — Z12.31 VISIT FOR SCREENING MAMMOGRAM: ICD-10-CM

## 2023-01-05 PROCEDURE — 77067 SCR MAMMO BI INCL CAD: CPT

## 2023-01-05 PROCEDURE — 77080 DXA BONE DENSITY AXIAL: CPT

## 2023-01-06 NOTE — RESULT ENCOUNTER NOTE
Let patient know bone density test does show borderline osteoporosis. She has had a lot of stomach trouble so Fosamax would not be a good alternative for her. Has she done injections for osteoporosis?

## 2023-03-09 ENCOUNTER — CARE COORDINATION (OUTPATIENT)
Dept: CARE COORDINATION | Facility: CLINIC | Age: 78
End: 2023-03-09

## 2023-03-10 ENCOUNTER — CARE COORDINATION (OUTPATIENT)
Dept: CARE COORDINATION | Facility: CLINIC | Age: 78
End: 2023-03-10

## 2023-04-26 ENCOUNTER — TELEPHONE (OUTPATIENT)
Dept: FAMILY MEDICINE CLINIC | Facility: CLINIC | Age: 78
End: 2023-04-26

## 2023-04-26 DIAGNOSIS — E03.9 ACQUIRED HYPOTHYROIDISM: Primary | ICD-10-CM

## 2023-04-26 DIAGNOSIS — E53.8 B12 DEFICIENCY: ICD-10-CM

## 2023-04-26 DIAGNOSIS — E78.00 HYPERCHOLESTEROLEMIA: ICD-10-CM

## 2023-05-15 ENCOUNTER — OFFICE VISIT (OUTPATIENT)
Dept: FAMILY MEDICINE CLINIC | Facility: CLINIC | Age: 78
End: 2023-05-15
Payer: MEDICARE

## 2023-05-15 VITALS
DIASTOLIC BLOOD PRESSURE: 63 MMHG | BODY MASS INDEX: 25.76 KG/M2 | OXYGEN SATURATION: 96 % | HEART RATE: 74 BPM | RESPIRATION RATE: 16 BRPM | HEIGHT: 62 IN | WEIGHT: 140 LBS | TEMPERATURE: 98.1 F | SYSTOLIC BLOOD PRESSURE: 149 MMHG

## 2023-05-15 DIAGNOSIS — Z87.19 HISTORY OF ESOPHAGEAL STRICTURE: ICD-10-CM

## 2023-05-15 DIAGNOSIS — E53.8 B12 DEFICIENCY: ICD-10-CM

## 2023-05-15 DIAGNOSIS — K27.9 PUD (PEPTIC ULCER DISEASE): ICD-10-CM

## 2023-05-15 DIAGNOSIS — D47.2 MONOCLONAL GAMMOPATHY: ICD-10-CM

## 2023-05-15 DIAGNOSIS — D50.9 IRON DEFICIENCY ANEMIA, UNSPECIFIED IRON DEFICIENCY ANEMIA TYPE: ICD-10-CM

## 2023-05-15 DIAGNOSIS — K21.00 GASTROESOPHAGEAL REFLUX DISEASE WITH ESOPHAGITIS WITHOUT HEMORRHAGE: Primary | ICD-10-CM

## 2023-05-15 PROCEDURE — 1036F TOBACCO NON-USER: CPT | Performed by: FAMILY MEDICINE

## 2023-05-15 PROCEDURE — G8427 DOCREV CUR MEDS BY ELIG CLIN: HCPCS | Performed by: FAMILY MEDICINE

## 2023-05-15 PROCEDURE — 99214 OFFICE O/P EST MOD 30 MIN: CPT | Performed by: FAMILY MEDICINE

## 2023-05-15 PROCEDURE — G8399 PT W/DXA RESULTS DOCUMENT: HCPCS | Performed by: FAMILY MEDICINE

## 2023-05-15 PROCEDURE — 1090F PRES/ABSN URINE INCON ASSESS: CPT | Performed by: FAMILY MEDICINE

## 2023-05-15 PROCEDURE — G8417 CALC BMI ABV UP PARAM F/U: HCPCS | Performed by: FAMILY MEDICINE

## 2023-05-15 PROCEDURE — 1123F ACP DISCUSS/DSCN MKR DOCD: CPT | Performed by: FAMILY MEDICINE

## 2023-05-15 RX ORDER — PREDNISONE 1 MG/1
5 TABLET ORAL DAILY
COMMUNITY

## 2023-05-15 RX ORDER — PANTOPRAZOLE SODIUM 40 MG/1
40 TABLET, DELAYED RELEASE ORAL DAILY
Qty: 90 TABLET | Refills: 3 | Status: SHIPPED | OUTPATIENT
Start: 2023-05-15

## 2023-05-15 RX ORDER — CYANOCOBALAMIN 1000 UG/ML
INJECTION, SOLUTION INTRAMUSCULAR; SUBCUTANEOUS
Qty: 1 ML | Refills: 11 | Status: SHIPPED | OUTPATIENT
Start: 2023-05-15

## 2023-05-15 SDOH — ECONOMIC STABILITY: INCOME INSECURITY: HOW HARD IS IT FOR YOU TO PAY FOR THE VERY BASICS LIKE FOOD, HOUSING, MEDICAL CARE, AND HEATING?: NOT HARD AT ALL

## 2023-05-15 SDOH — ECONOMIC STABILITY: HOUSING INSECURITY
IN THE LAST 12 MONTHS, WAS THERE A TIME WHEN YOU DID NOT HAVE A STEADY PLACE TO SLEEP OR SLEPT IN A SHELTER (INCLUDING NOW)?: NO

## 2023-05-15 SDOH — ECONOMIC STABILITY: FOOD INSECURITY: WITHIN THE PAST 12 MONTHS, YOU WORRIED THAT YOUR FOOD WOULD RUN OUT BEFORE YOU GOT MONEY TO BUY MORE.: NEVER TRUE

## 2023-05-15 SDOH — ECONOMIC STABILITY: FOOD INSECURITY: WITHIN THE PAST 12 MONTHS, THE FOOD YOU BOUGHT JUST DIDN'T LAST AND YOU DIDN'T HAVE MONEY TO GET MORE.: NEVER TRUE

## 2023-05-15 ASSESSMENT — PATIENT HEALTH QUESTIONNAIRE - PHQ9
SUM OF ALL RESPONSES TO PHQ9 QUESTIONS 1 & 2: 2
SUM OF ALL RESPONSES TO PHQ QUESTIONS 1-9: 2
2. FEELING DOWN, DEPRESSED OR HOPELESS: 1
1. LITTLE INTEREST OR PLEASURE IN DOING THINGS: 1

## 2023-05-15 NOTE — PROGRESS NOTES
[address] [facility]  [place], [address]  Phone: [phone] Fax [phone]  Erickson Ames MD  5/15/2023           Ms. Hill Dean  is a 65y.o.  year old  female patient who comes in complaining of reflux. She has been taking omeprazole but it seems to make things worse and certainly not better. She also is having taken an hour before she eats which is very inconvenient. She does have a history of peptic ulcer disease and has had an esophageal stricture in the past.    She also needs some refills. She also is seeing hematology for monoclonal gammopathy and iron deficiency and and her hematologist is retiring and she needs to be referred to a new one. Ms. 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 Ms. Hill Dean  has  has a past surgical history that includes Breast biopsy (Right, 10/30/2017); heent; heent; Cataract removal (Left); Hysterectomy (1980); esophgl balo distension dx std w/provocation; Colonoscopy (03/15/2017); Thyroidectomy (06/2005); [facility] STEREO BREAST BX W LOC DEVICE 1ST LESION RIGHT (Right, 10/30/2017); Upper gastrointestinal endoscopy (N/A, 7/28/2022); and Colonoscopy (N/A, 7/28/2022). Ms. Hill Dean   Current Outpatient Medications   Medication Sig Dispense Refill    predniSONE (DELTASONE) 5 MG tablet Take 1 tablet by mouth daily      cyanocobalamin 1000 MCG/ML injection INJECT 1 ML BY INTRAMUSCULAR ROUTE EVERY THIRTY (30) DAYS. 1 mL 11    pantoprazole (PROTONIX) 40 MG tablet Take 1 tablet by mouth daily 90 tablet 3    levothyroxine (SYNTHROID) 50 MCG tablet Take 1 p.o. every other day alternating with the 75 mcg dose.  90 tablet 3    levothyroxine (SYNTHROID) 75 MCG tablet Take 1 tablet by mouth Daily One po every other day alternating with

## 2023-06-01 ENCOUNTER — TELEPHONE (OUTPATIENT)
Dept: FAMILY MEDICINE CLINIC | Facility: CLINIC | Age: 78
End: 2023-06-01

## 2023-06-02 RX ORDER — ESOMEPRAZOLE MAGNESIUM 40 MG/1
40 CAPSULE, DELAYED RELEASE ORAL
Qty: 90 CAPSULE | Refills: 1 | Status: SHIPPED | OUTPATIENT
Start: 2023-06-02

## 2023-06-22 NOTE — PROGRESS NOTES
New Patient Abstract    Reason for Referral: Monoclonal gammopathy; Iron deficiency anemia, unspecified iron deficiency anemia type    Referring Provider: Fred Pires MD    Primary Care Provider: Fred Pires MD    Family History of Cancer/Hematologic Disorders: Family history significant for maternal aunt with breast cancer. Presenting Symptoms: KEVIN and MGUS    Narrative with recent with Results/Procedures/Biopsies and Dates completed:      78-year-old black female    Patient has a history of (EPIC)  anemia caused by folic acid deficiency, GERD, PUD, has had an esophageal stricture in the past, HTN and RA    Patient followed by Dr. Heath Banks in [facility] for RA (EPIC/Media)  Was on Methotrexate, but now on Orencia; infusion once a month  Lab work up revealed elevated protein level. CBC showed a WBC of 4.5, Hgb of 9.0, Hct of 28.1 and PC of 162. Serum protein electrophoresis showed elevated gamma globulins. Serum immunoglobulin level showed IgG of 2720, IgA 975, IgM 742. Serum immunofixation showed area of restricted mobility in the IgG and K lanes. 4/10/23 - met with Hematologist/Oncologist, Deysi Cosby MD for further evaluation to r/o monoclonal gammopathy (EPIC/Media)  Lab workup revealed polyclonal gammopathy with no monoclonal protein  Patient was anemic and her ferritin level was low at 6 ng/ml. which confirmed iron deficiency  Noted: suspect due to GI bleeding or marked epistaxis episodes; patient was taken off Arthrotec. Noted: urine and serum protein electrophoresis in the past showed polyclonal increase in free K and Lambda light chains, but no monoclonal protein. Serum protein electrophoresis showed a monoclonal band of 0.4 ng/dl composed of IgG Kappa monoclonal protein.   Noted: MD suspected that patient has MGUS rather than MM.  8/24/20 and 4/10/23 - SPEP and immunofixation were normal.  Received Iron Dextran infusions  7/1/14  6/8/15  1/30/19  2/9/22  12/11/22  12/2018 - Hgb dropped

## 2023-06-23 ENCOUNTER — OFFICE VISIT (OUTPATIENT)
Dept: ONCOLOGY | Age: 78
End: 2023-06-23
Payer: MEDICARE

## 2023-06-23 VITALS
OXYGEN SATURATION: 99 % | WEIGHT: 139 LBS | RESPIRATION RATE: 16 BRPM | BODY MASS INDEX: 25.58 KG/M2 | DIASTOLIC BLOOD PRESSURE: 78 MMHG | HEART RATE: 74 BPM | SYSTOLIC BLOOD PRESSURE: 179 MMHG | TEMPERATURE: 98.6 F | HEIGHT: 62 IN

## 2023-06-23 DIAGNOSIS — D52.1 DRUG-INDUCED FOLATE DEFICIENCY ANEMIA: Primary | ICD-10-CM

## 2023-06-23 DIAGNOSIS — D47.2 MGUS (MONOCLONAL GAMMOPATHY OF UNKNOWN SIGNIFICANCE): ICD-10-CM

## 2023-06-23 PROCEDURE — 1123F ACP DISCUSS/DSCN MKR DOCD: CPT | Performed by: INTERNAL MEDICINE

## 2023-06-23 PROCEDURE — G8417 CALC BMI ABV UP PARAM F/U: HCPCS | Performed by: INTERNAL MEDICINE

## 2023-06-23 PROCEDURE — 1036F TOBACCO NON-USER: CPT | Performed by: INTERNAL MEDICINE

## 2023-06-23 PROCEDURE — 1090F PRES/ABSN URINE INCON ASSESS: CPT | Performed by: INTERNAL MEDICINE

## 2023-06-23 PROCEDURE — 99204 OFFICE O/P NEW MOD 45 MIN: CPT | Performed by: INTERNAL MEDICINE

## 2023-06-23 PROCEDURE — G8399 PT W/DXA RESULTS DOCUMENT: HCPCS | Performed by: INTERNAL MEDICINE

## 2023-06-23 PROCEDURE — G8427 DOCREV CUR MEDS BY ELIG CLIN: HCPCS | Performed by: INTERNAL MEDICINE

## 2023-06-23 ASSESSMENT — PATIENT HEALTH QUESTIONNAIRE - PHQ9
SUM OF ALL RESPONSES TO PHQ QUESTIONS 1-9: 0
SUM OF ALL RESPONSES TO PHQ QUESTIONS 1-9: 0
2. FEELING DOWN, DEPRESSED OR HOPELESS: 0
SUM OF ALL RESPONSES TO PHQ QUESTIONS 1-9: 0
SUM OF ALL RESPONSES TO PHQ QUESTIONS 1-9: 0
SUM OF ALL RESPONSES TO PHQ9 QUESTIONS 1 & 2: 0
1. LITTLE INTEREST OR PLEASURE IN DOING THINGS: 0

## 2023-06-23 NOTE — PROGRESS NOTES
[facility]: Office Visit New Patient H & P    Chief Complaint:    Chief Complaint   Patient presents with    New Patient         History of Present Illness:  Ms. Jeanne Hamilton is a 65 y.o. female who presents today for evaluation regarding anemia and MGUS. She has a history of anemia felt to be related to folate deficiency, she has been on MTX for RA in the past with poor tolerance, and now is on [place]. She underwent testing at her PCP which showed elevated polyclonal gammopathy, with an area of \"restricted mobility\" in the IgG and kappa lanes. On one occasion she did have an M-spike to 0.4 g/dL, but this is was felt to be an MGUS rather than a plasma cell disorder. Of note, she also has a history of iron deficiency with a history of iron infusions done previously. No current symptoms. Review of Systems:  Constitutional: Negative. HENT: Negative. Eyes: Negative. Respiratory: Negative. Cardiovascular: Negative. Gastrointestinal: Negative. Genitourinary: Negative. Musculoskeletal: Negative. Skin: Negative. Neurological: Negative. Endo/Heme/Allergies: Negative. Psychiatric/Behavioral: Negative. All other systems reviewed and are negative. Allergies   Allergen Reactions    Diphenhydramine Other (See Comments)     Other reaction(s): Anaphylactic shock-Allergy    Lidocaine Other (See Comments)     jittery and lost voice; trembling; stuttering    Amoxicillin Other (See Comments)     stuttering    Atorvastatin Other (See Comments)     Other reaction(s): Myalgia-Intolerance    Famotidine Other (See Comments)     Not being able to talk  Other reaction(s): Other- (not listed) - Allergy  Not being able to talk    Hydrocodone-Acetaminophen Other (See Comments)     affected speech    Infliximab Other (See Comments)     messed up immune system    Methotrexate Other (See Comments)     messed up immune system  Other reaction(s):  Other- (not listed) - Allergy  messed up

## 2023-06-23 NOTE — PATIENT INSTRUCTIONS
Patient Instructions from Today's Visit    Reason for Visit:  New Patient Monoclonal Gammopathy     Diagnosis Information:  https://[email or web address]/. net/about-us/asco-answers-patient-education-materials/zdgv-ljjkxml-jkyt-sheets    Plan:  Labs every 6 months  Anemia of Chronic Inflammation     Follow Up: Follow up in October Recent Lab Results:  N/A    Treatment Summary has been discussed and given to patient:   N/A  -------------------------------------------------------------------------------------------------------------------  Please call our office at [phone] if you have any  of the following symptoms:   Fever of 100.5 or greater  Chills  Shortness of breath  Swelling or pain in one leg    After office hours an answering service is available and will contact a provider for emergencies or if you are experiencing any of the above symptoms. Patient does express an interest in My Chart. My Chart log in information explained on the after visit summary printout at the [facility] [address] desk.     Luis Friedman RN

## 2023-06-30 ENCOUNTER — TELEPHONE (OUTPATIENT)
Dept: FAMILY MEDICINE CLINIC | Facility: CLINIC | Age: 78
End: 2023-06-30

## 2023-08-10 ENCOUNTER — TELEPHONE (OUTPATIENT)
Dept: FAMILY MEDICINE CLINIC | Facility: CLINIC | Age: 78
End: 2023-08-10

## 2023-10-16 ENCOUNTER — HOSPITAL ENCOUNTER (OUTPATIENT)
Dept: LAB | Age: 78
Discharge: HOME | End: 2023-10-19
Payer: MEDICARE

## 2023-10-16 DIAGNOSIS — D47.2 MGUS (MONOCLONAL GAMMOPATHY OF UNKNOWN SIGNIFICANCE): ICD-10-CM

## 2023-10-16 DIAGNOSIS — D52.1 DRUG-INDUCED FOLATE DEFICIENCY ANEMIA: ICD-10-CM

## 2023-10-16 LAB
ALBUMIN SERPL-MCNC: 2.7 G/DL (ref 3.2–4.6)
ALBUMIN/GLOB SERPL: 0.8 (ref 0.4–1.6)
ALP SERPL-CCNC: 86 U/L (ref 50–136)
ALT SERPL-CCNC: 26 U/L (ref 12–65)
ANION GAP SERPL CALC-SCNC: 5 MMOL/L (ref 2–11)
AST SERPL-CCNC: 25 U/L (ref 15–37)
BASOPHILS # BLD: 0 K/UL (ref 0–0.2)
BASOPHILS NFR BLD: 1 % (ref 0–2)
BILIRUB SERPL-MCNC: 0.4 MG/DL (ref 0.2–1.1)
BUN SERPL-MCNC: 20 MG/DL (ref 8–23)
CALCIUM SERPL-MCNC: 9.1 MG/DL (ref 8.3–10.4)
CHLORIDE SERPL-SCNC: 114 MMOL/L (ref 101–110)
CO2 SERPL-SCNC: 24 MMOL/L (ref 21–32)
CREAT SERPL-MCNC: 1.3 MG/DL (ref 0.6–1)
DIFFERENTIAL METHOD BLD: ABNORMAL
EOSINOPHIL # BLD: 0.1 K/UL (ref 0–0.8)
EOSINOPHIL NFR BLD: 2 % (ref 0.5–7.8)
ERYTHROCYTE [DISTWIDTH] IN BLOOD BY AUTOMATED COUNT: 14.2 % (ref 11.9–14.6)
FERRITIN SERPL-MCNC: 20 NG/ML (ref 8–388)
GLOBULIN SER CALC-MCNC: 3.4 G/DL (ref 2.8–4.5)
GLUCOSE SERPL-MCNC: 153 MG/DL (ref 65–100)
HCT VFR BLD AUTO: 33.9 % (ref 35.8–46.3)
HGB BLD-MCNC: 10.4 G/DL (ref 11.7–15.4)
IMM GRANULOCYTES # BLD AUTO: 0 K/UL (ref 0–0.5)
IMM GRANULOCYTES NFR BLD AUTO: 1 % (ref 0–5)
IRON SATN MFR SERPL: 12 %
IRON SERPL-MCNC: 35 UG/DL (ref 35–150)
LYMPHOCYTES # BLD: 0.5 K/UL (ref 0.5–4.6)
LYMPHOCYTES NFR BLD: 10 % (ref 13–44)
MCH RBC QN AUTO: 28.2 PG (ref 26.1–32.9)
MCHC RBC AUTO-ENTMCNC: 30.7 G/DL (ref 31.4–35)
MCV RBC AUTO: 91.9 FL (ref 82–102)
MONOCYTES # BLD: 0.5 K/UL (ref 0.1–1.3)
MONOCYTES NFR BLD: 10 % (ref 4–12)
NEUTS SEG # BLD: 4 K/UL (ref 1.7–8.2)
NEUTS SEG NFR BLD: 76 % (ref 43–78)
NRBC # BLD: 0 K/UL (ref 0–0.2)
PLATELET # BLD AUTO: 152 K/UL (ref 150–450)
PMV BLD AUTO: 10.9 FL (ref 9.4–12.3)
POTASSIUM SERPL-SCNC: 3.9 MMOL/L (ref 3.5–5.1)
PROT SERPL-MCNC: 6.1 G/DL (ref 6.3–8.2)
RBC # BLD AUTO: 3.69 M/UL (ref 4.05–5.2)
SODIUM SERPL-SCNC: 143 MMOL/L (ref 133–143)
TIBC SERPL-MCNC: 282 UG/DL (ref 250–450)
WBC # BLD AUTO: 5.2 K/UL (ref 4.3–11.1)

## 2023-10-16 PROCEDURE — 80053 COMPREHEN METABOLIC PANEL: CPT

## 2023-10-16 PROCEDURE — 83540 ASSAY OF IRON: CPT

## 2023-10-16 PROCEDURE — 82784 ASSAY IGA/IGD/IGG/IGM EACH: CPT

## 2023-10-16 PROCEDURE — 83521 IG LIGHT CHAINS FREE EACH: CPT

## 2023-10-16 PROCEDURE — 83550 IRON BINDING TEST: CPT

## 2023-10-16 PROCEDURE — 85025 COMPLETE CBC W/AUTO DIFF WBC: CPT

## 2023-10-16 PROCEDURE — 84165 PROTEIN E-PHORESIS SERUM: CPT

## 2023-10-16 PROCEDURE — 82728 ASSAY OF FERRITIN: CPT

## 2023-10-16 PROCEDURE — 86334 IMMUNOFIX E-PHORESIS SERUM: CPT

## 2023-10-16 PROCEDURE — 36415 COLL VENOUS BLD VENIPUNCTURE: CPT

## 2023-10-17 LAB
KAPPA LC FREE SER-MCNC: 49.2 MG/L (ref 3.3–19.4)
KAPPA LC FREE/LAMBDA FREE SER: 1.6 (ref 0.26–1.65)
LAMBDA LC FREE SERPL-MCNC: 30.8 MG/L (ref 5.7–26.3)

## 2023-10-19 LAB
ALBUMIN SERPL ELPH-MCNC: 2.9 G/DL (ref 2.9–4.4)
ALBUMIN/GLOB SERPL: 1.2 (ref 0.7–1.7)
ALPHA1 GLOB SERPL ELPH-MCNC: 0.2 G/DL (ref 0–0.4)
ALPHA2 GLOB SERPL ELPH-MCNC: 0.6 G/DL (ref 0.4–1)
B-GLOBULIN SERPL ELPH-MCNC: 0.9 G/DL (ref 0.7–1.3)
GAMMA GLOB SERPL ELPH-MCNC: 1 G/DL (ref 0.4–1.8)
GLOBULIN SER-MCNC: 2.6 G/DL (ref 2.2–3.9)
IGA SERPL-MCNC: 297 MG/DL (ref 64–422)
IGG SERPL-MCNC: 812 MG/DL (ref 586–1602)
IGM SERPL-MCNC: 206 MG/DL (ref 26–217)
INTERPRETATION SERPL IEP-IMP: ABNORMAL
M PROTEIN SERPL ELPH-MCNC: ABNORMAL G/DL
PROT SERPL-MCNC: 5.5 G/DL (ref 6–8.5)

## 2023-11-07 ENCOUNTER — NURSE ONLY (OUTPATIENT)
Dept: FAMILY MEDICINE CLINIC | Facility: CLINIC | Age: 78
End: 2023-11-07

## 2023-11-07 DIAGNOSIS — E53.8 B12 DEFICIENCY: ICD-10-CM

## 2023-11-07 DIAGNOSIS — E78.00 HYPERCHOLESTEROLEMIA: ICD-10-CM

## 2023-11-07 DIAGNOSIS — E03.9 ACQUIRED HYPOTHYROIDISM: ICD-10-CM

## 2023-11-07 LAB
ALBUMIN SERPL-MCNC: 2.8 G/DL (ref 3.2–4.6)
ALBUMIN/GLOB SERPL: 0.9 (ref 0.4–1.6)
ALP SERPL-CCNC: 78 U/L (ref 50–136)
ALT SERPL-CCNC: 26 U/L (ref 12–65)
ANION GAP SERPL CALC-SCNC: 9 MMOL/L (ref 2–11)
AST SERPL-CCNC: 30 U/L (ref 15–37)
BASOPHILS # BLD: 0 K/UL (ref 0–0.2)
BASOPHILS NFR BLD: 1 % (ref 0–2)
BILIRUB SERPL-MCNC: 0.4 MG/DL (ref 0.2–1.1)
BUN SERPL-MCNC: 23 MG/DL (ref 8–23)
CALCIUM SERPL-MCNC: 9.8 MG/DL (ref 8.3–10.4)
CHLORIDE SERPL-SCNC: 113 MMOL/L (ref 101–110)
CHOLEST SERPL-MCNC: 191 MG/DL
CO2 SERPL-SCNC: 22 MMOL/L (ref 21–32)
CREAT SERPL-MCNC: 1.2 MG/DL (ref 0.6–1)
DIFFERENTIAL METHOD BLD: ABNORMAL
EOSINOPHIL # BLD: 0.1 K/UL (ref 0–0.8)
EOSINOPHIL NFR BLD: 2 % (ref 0.5–7.8)
ERYTHROCYTE [DISTWIDTH] IN BLOOD BY AUTOMATED COUNT: 14.6 % (ref 11.9–14.6)
GLOBULIN SER CALC-MCNC: 3.2 G/DL (ref 2.8–4.5)
GLUCOSE SERPL-MCNC: 111 MG/DL (ref 65–100)
HCT VFR BLD AUTO: 34.5 % (ref 35.8–46.3)
HDLC SERPL-MCNC: 62 MG/DL (ref 40–60)
HDLC SERPL: 3.1
HGB BLD-MCNC: 10.4 G/DL (ref 11.7–15.4)
IMM GRANULOCYTES # BLD AUTO: 0 K/UL (ref 0–0.5)
IMM GRANULOCYTES NFR BLD AUTO: 0 % (ref 0–5)
LDLC SERPL CALC-MCNC: 103.6 MG/DL
LYMPHOCYTES # BLD: 0.6 K/UL (ref 0.5–4.6)
LYMPHOCYTES NFR BLD: 13 % (ref 13–44)
MCH RBC QN AUTO: 28 PG (ref 26.1–32.9)
MCHC RBC AUTO-ENTMCNC: 30.1 G/DL (ref 31.4–35)
MCV RBC AUTO: 92.7 FL (ref 82–102)
MONOCYTES # BLD: 0.6 K/UL (ref 0.1–1.3)
MONOCYTES NFR BLD: 13 % (ref 4–12)
NEUTS SEG # BLD: 3.2 K/UL (ref 1.7–8.2)
NEUTS SEG NFR BLD: 71 % (ref 43–78)
NRBC # BLD: 0 K/UL (ref 0–0.2)
PLATELET # BLD AUTO: 177 K/UL (ref 150–450)
PMV BLD AUTO: 11.5 FL (ref 9.4–12.3)
POTASSIUM SERPL-SCNC: 4.1 MMOL/L (ref 3.5–5.1)
PROT SERPL-MCNC: 6 G/DL (ref 6.3–8.2)
RBC # BLD AUTO: 3.72 M/UL (ref 4.05–5.2)
SODIUM SERPL-SCNC: 144 MMOL/L (ref 133–143)
TRIGL SERPL-MCNC: 127 MG/DL (ref 35–150)
TSH, 3RD GENERATION: 0.99 UIU/ML (ref 0.36–3.74)
VIT B12 SERPL-MCNC: 824 PG/ML (ref 193–986)
VLDLC SERPL CALC-MCNC: 25.4 MG/DL (ref 6–23)
WBC # BLD AUTO: 4.6 K/UL (ref 4.3–11.1)

## 2023-11-14 ENCOUNTER — TELEPHONE (OUTPATIENT)
Dept: FAMILY MEDICINE CLINIC | Facility: CLINIC | Age: 78
End: 2023-11-14

## 2023-11-14 ENCOUNTER — OFFICE VISIT (OUTPATIENT)
Dept: FAMILY MEDICINE CLINIC | Facility: CLINIC | Age: 78
End: 2023-11-14
Payer: MEDICARE

## 2023-11-14 VITALS
HEIGHT: 62 IN | TEMPERATURE: 97.2 F | DIASTOLIC BLOOD PRESSURE: 67 MMHG | BODY MASS INDEX: 25.76 KG/M2 | OXYGEN SATURATION: 98 % | HEART RATE: 69 BPM | WEIGHT: 140 LBS | SYSTOLIC BLOOD PRESSURE: 171 MMHG | RESPIRATION RATE: 16 BRPM

## 2023-11-14 DIAGNOSIS — H69.91 DYSFUNCTION OF RIGHT EUSTACHIAN TUBE: ICD-10-CM

## 2023-11-14 DIAGNOSIS — Z00.00 MEDICARE ANNUAL WELLNESS VISIT, SUBSEQUENT: ICD-10-CM

## 2023-11-14 DIAGNOSIS — Z23 NEED FOR IMMUNIZATION AGAINST INFLUENZA: Primary | ICD-10-CM

## 2023-11-14 DIAGNOSIS — E03.9 ACQUIRED HYPOTHYROIDISM: ICD-10-CM

## 2023-11-14 DIAGNOSIS — Z12.31 SCREENING MAMMOGRAM FOR HIGH-RISK PATIENT: Primary | ICD-10-CM

## 2023-11-14 DIAGNOSIS — M05.9 RHEUMATOID ARTHRITIS WITH POSITIVE RHEUMATOID FACTOR, INVOLVING UNSPECIFIED SITE (HCC): ICD-10-CM

## 2023-11-14 DIAGNOSIS — K21.00 GASTROESOPHAGEAL REFLUX DISEASE WITH ESOPHAGITIS WITHOUT HEMORRHAGE: ICD-10-CM

## 2023-11-14 DIAGNOSIS — N18.31 CHRONIC RENAL FAILURE, STAGE 3A (HCC): ICD-10-CM

## 2023-11-14 DIAGNOSIS — K27.9 PUD (PEPTIC ULCER DISEASE): ICD-10-CM

## 2023-11-14 PROCEDURE — 99214 OFFICE O/P EST MOD 30 MIN: CPT | Performed by: FAMILY MEDICINE

## 2023-11-14 PROCEDURE — 1036F TOBACCO NON-USER: CPT | Performed by: FAMILY MEDICINE

## 2023-11-14 PROCEDURE — G8427 DOCREV CUR MEDS BY ELIG CLIN: HCPCS | Performed by: FAMILY MEDICINE

## 2023-11-14 PROCEDURE — 1123F ACP DISCUSS/DSCN MKR DOCD: CPT | Performed by: FAMILY MEDICINE

## 2023-11-14 PROCEDURE — 1090F PRES/ABSN URINE INCON ASSESS: CPT | Performed by: FAMILY MEDICINE

## 2023-11-14 PROCEDURE — G8399 PT W/DXA RESULTS DOCUMENT: HCPCS | Performed by: FAMILY MEDICINE

## 2023-11-14 PROCEDURE — G0439 PPPS, SUBSEQ VISIT: HCPCS | Performed by: FAMILY MEDICINE

## 2023-11-14 PROCEDURE — G8417 CALC BMI ABV UP PARAM F/U: HCPCS | Performed by: FAMILY MEDICINE

## 2023-11-14 PROCEDURE — G8484 FLU IMMUNIZE NO ADMIN: HCPCS | Performed by: FAMILY MEDICINE

## 2023-11-14 RX ORDER — LEVOTHYROXINE SODIUM 0.07 MG/1
75 TABLET ORAL DAILY
Qty: 90 TABLET | Refills: 3 | Status: SHIPPED | OUTPATIENT
Start: 2023-11-14

## 2023-11-14 RX ORDER — LEVOTHYROXINE SODIUM 0.05 MG/1
TABLET ORAL
Qty: 90 TABLET | Refills: 3 | Status: SHIPPED | OUTPATIENT
Start: 2023-11-14

## 2023-11-14 RX ORDER — FLUTICASONE PROPIONATE 50 MCG
2 SPRAY, SUSPENSION (ML) NASAL DAILY
Qty: 16 G | Refills: 5 | Status: SHIPPED | OUTPATIENT
Start: 2023-11-14

## 2023-11-14 ASSESSMENT — PATIENT HEALTH QUESTIONNAIRE - PHQ9
SUM OF ALL RESPONSES TO PHQ QUESTIONS 1-9: 0
1. LITTLE INTEREST OR PLEASURE IN DOING THINGS: 0
SUM OF ALL RESPONSES TO PHQ QUESTIONS 1-9: 0
2. FEELING DOWN, DEPRESSED OR HOPELESS: 0
SUM OF ALL RESPONSES TO PHQ9 QUESTIONS 1 & 2: 0

## 2023-11-14 ASSESSMENT — LIFESTYLE VARIABLES
HOW MANY STANDARD DRINKS CONTAINING ALCOHOL DO YOU HAVE ON A TYPICAL DAY: PATIENT DOES NOT DRINK
HOW OFTEN DO YOU HAVE A DRINK CONTAINING ALCOHOL: NEVER

## 2023-11-14 NOTE — TELEPHONE ENCOUNTER
Patient is requesting that Dr. Madonna Maria place an order Ricardo Blase her mammogram with [facility].

## 2023-11-15 PROCEDURE — 90694 VACC AIIV4 NO PRSRV 0.5ML IM: CPT | Performed by: FAMILY MEDICINE

## 2023-11-15 PROCEDURE — G0008 ADMIN INFLUENZA VIRUS VAC: HCPCS | Performed by: FAMILY MEDICINE

## 2023-11-21 DIAGNOSIS — D52.1 DRUG-INDUCED FOLATE DEFICIENCY ANEMIA: ICD-10-CM

## 2023-11-21 DIAGNOSIS — D47.2 MGUS (MONOCLONAL GAMMOPATHY OF UNKNOWN SIGNIFICANCE): Primary | ICD-10-CM

## 2023-11-22 ENCOUNTER — HOSPITAL ENCOUNTER (OUTPATIENT)
Dept: LAB | Age: 78
Discharge: HOME | End: 2023-11-25
Payer: MEDICARE

## 2023-11-22 ENCOUNTER — OFFICE VISIT (OUTPATIENT)
Dept: ONCOLOGY | Age: 78
End: 2023-11-22
Payer: MEDICARE

## 2023-11-22 VITALS
WEIGHT: 138.4 LBS | HEART RATE: 71 BPM | OXYGEN SATURATION: 99 % | HEIGHT: 62 IN | SYSTOLIC BLOOD PRESSURE: 157 MMHG | RESPIRATION RATE: 16 BRPM | BODY MASS INDEX: 25.47 KG/M2 | DIASTOLIC BLOOD PRESSURE: 64 MMHG | TEMPERATURE: 98.1 F

## 2023-11-22 DIAGNOSIS — D52.1 DRUG-INDUCED FOLATE DEFICIENCY ANEMIA: ICD-10-CM

## 2023-11-22 DIAGNOSIS — D47.2 MGUS (MONOCLONAL GAMMOPATHY OF UNKNOWN SIGNIFICANCE): ICD-10-CM

## 2023-11-22 DIAGNOSIS — D47.2 MGUS (MONOCLONAL GAMMOPATHY OF UNKNOWN SIGNIFICANCE): Primary | ICD-10-CM

## 2023-11-22 LAB
ALBUMIN SERPL-MCNC: 2.9 G/DL (ref 3.2–4.6)
ALBUMIN/GLOB SERPL: 0.8 (ref 0.4–1.6)
ALP SERPL-CCNC: 82 U/L (ref 50–136)
ALT SERPL-CCNC: 28 U/L (ref 12–65)
ANION GAP SERPL CALC-SCNC: 5 MMOL/L (ref 2–11)
AST SERPL-CCNC: 24 U/L (ref 15–37)
BASOPHILS # BLD: 0 K/UL (ref 0–0.2)
BASOPHILS NFR BLD: 0 % (ref 0–2)
BILIRUB SERPL-MCNC: 0.5 MG/DL (ref 0.2–1.1)
BUN SERPL-MCNC: 31 MG/DL (ref 8–23)
CALCIUM SERPL-MCNC: 8.9 MG/DL (ref 8.3–10.4)
CHLORIDE SERPL-SCNC: 114 MMOL/L (ref 101–110)
CO2 SERPL-SCNC: 24 MMOL/L (ref 21–32)
CREAT SERPL-MCNC: 1.2 MG/DL (ref 0.6–1)
DIFFERENTIAL METHOD BLD: ABNORMAL
EOSINOPHIL # BLD: 0.1 K/UL (ref 0–0.8)
EOSINOPHIL NFR BLD: 1 % (ref 0.5–7.8)
ERYTHROCYTE [DISTWIDTH] IN BLOOD BY AUTOMATED COUNT: 14.6 % (ref 11.9–14.6)
FERRITIN SERPL-MCNC: 17 NG/ML (ref 8–388)
GLOBULIN SER CALC-MCNC: 3.6 G/DL (ref 2.8–4.5)
GLUCOSE SERPL-MCNC: 158 MG/DL (ref 65–100)
HCT VFR BLD AUTO: 31.3 % (ref 35.8–46.3)
HGB BLD-MCNC: 9.4 G/DL (ref 11.7–15.4)
IMM GRANULOCYTES # BLD AUTO: 0 K/UL (ref 0–0.5)
IMM GRANULOCYTES NFR BLD AUTO: 1 % (ref 0–5)
IRON SATN MFR SERPL: 10 %
IRON SERPL-MCNC: 33 UG/DL (ref 35–150)
LYMPHOCYTES # BLD: 0.5 K/UL (ref 0.5–4.6)
LYMPHOCYTES NFR BLD: 7 % (ref 13–44)
MCH RBC QN AUTO: 26.8 PG (ref 26.1–32.9)
MCHC RBC AUTO-ENTMCNC: 30 G/DL (ref 31.4–35)
MCV RBC AUTO: 89.2 FL (ref 82–102)
MONOCYTES # BLD: 0.6 K/UL (ref 0.1–1.3)
MONOCYTES NFR BLD: 8 % (ref 4–12)
NEUTS SEG # BLD: 5.6 K/UL (ref 1.7–8.2)
NEUTS SEG NFR BLD: 83 % (ref 43–78)
NRBC # BLD: 0 K/UL (ref 0–0.2)
PLATELET # BLD AUTO: 184 K/UL (ref 150–450)
PMV BLD AUTO: 10.8 FL (ref 9.4–12.3)
POTASSIUM SERPL-SCNC: 4.3 MMOL/L (ref 3.5–5.1)
PROT SERPL-MCNC: 6.5 G/DL (ref 6.3–8.2)
RBC # BLD AUTO: 3.51 M/UL (ref 4.05–5.2)
SODIUM SERPL-SCNC: 143 MMOL/L (ref 133–143)
TIBC SERPL-MCNC: 341 UG/DL (ref 250–450)
WBC # BLD AUTO: 6.7 K/UL (ref 4.3–11.1)

## 2023-11-22 PROCEDURE — G8399 PT W/DXA RESULTS DOCUMENT: HCPCS | Performed by: INTERNAL MEDICINE

## 2023-11-22 PROCEDURE — G8428 CUR MEDS NOT DOCUMENT: HCPCS | Performed by: INTERNAL MEDICINE

## 2023-11-22 PROCEDURE — G8417 CALC BMI ABV UP PARAM F/U: HCPCS | Performed by: INTERNAL MEDICINE

## 2023-11-22 PROCEDURE — 83521 IG LIGHT CHAINS FREE EACH: CPT

## 2023-11-22 PROCEDURE — 85025 COMPLETE CBC W/AUTO DIFF WBC: CPT

## 2023-11-22 PROCEDURE — 82784 ASSAY IGA/IGD/IGG/IGM EACH: CPT

## 2023-11-22 PROCEDURE — 99214 OFFICE O/P EST MOD 30 MIN: CPT | Performed by: INTERNAL MEDICINE

## 2023-11-22 PROCEDURE — 82728 ASSAY OF FERRITIN: CPT

## 2023-11-22 PROCEDURE — 36415 COLL VENOUS BLD VENIPUNCTURE: CPT

## 2023-11-22 PROCEDURE — 84165 PROTEIN E-PHORESIS SERUM: CPT

## 2023-11-22 PROCEDURE — G8484 FLU IMMUNIZE NO ADMIN: HCPCS | Performed by: INTERNAL MEDICINE

## 2023-11-22 PROCEDURE — 1090F PRES/ABSN URINE INCON ASSESS: CPT | Performed by: INTERNAL MEDICINE

## 2023-11-22 PROCEDURE — 80053 COMPREHEN METABOLIC PANEL: CPT

## 2023-11-22 PROCEDURE — 84238 ASSAY NONENDOCRINE RECEPTOR: CPT

## 2023-11-22 PROCEDURE — 1123F ACP DISCUSS/DSCN MKR DOCD: CPT | Performed by: INTERNAL MEDICINE

## 2023-11-22 PROCEDURE — 83550 IRON BINDING TEST: CPT

## 2023-11-22 PROCEDURE — 1036F TOBACCO NON-USER: CPT | Performed by: INTERNAL MEDICINE

## 2023-11-22 PROCEDURE — 83540 ASSAY OF IRON: CPT

## 2023-11-22 PROCEDURE — 86334 IMMUNOFIX E-PHORESIS SERUM: CPT

## 2023-11-22 RX ORDER — ACETAMINOPHEN 325 MG/1
650 TABLET ORAL
OUTPATIENT
Start: 2023-11-29

## 2023-11-22 RX ORDER — HEPARIN SODIUM (PORCINE) LOCK FLUSH IV SOLN 100 UNIT/ML 100 UNIT/ML
500 SOLUTION INTRAVENOUS PRN
OUTPATIENT
Start: 2023-11-29

## 2023-11-22 RX ORDER — EPINEPHRINE 1 MG/ML
0.3 INJECTION, SOLUTION, CONCENTRATE INTRAVENOUS PRN
OUTPATIENT
Start: 2023-11-29

## 2023-11-22 RX ORDER — SENNOSIDES 8.6 MG
CAPSULE ORAL EVERY 8 HOURS
COMMUNITY

## 2023-11-22 RX ORDER — SODIUM CHLORIDE 9 MG/ML
5-250 INJECTION, SOLUTION INTRAVENOUS PRN
OUTPATIENT
Start: 2023-11-29

## 2023-11-22 RX ORDER — FAMOTIDINE 10 MG/ML
20 INJECTION, SOLUTION INTRAVENOUS
OUTPATIENT
Start: 2023-11-29

## 2023-11-22 RX ORDER — SODIUM CHLORIDE 0.9 % (FLUSH) 0.9 %
5-40 SYRINGE (ML) INJECTION PRN
OUTPATIENT
Start: 2023-11-29

## 2023-11-22 RX ORDER — DIPHENHYDRAMINE HYDROCHLORIDE 50 MG/ML
50 INJECTION INTRAMUSCULAR; INTRAVENOUS
OUTPATIENT
Start: 2023-11-29

## 2023-11-22 RX ORDER — ONDANSETRON 2 MG/ML
8 INJECTION INTRAMUSCULAR; INTRAVENOUS
OUTPATIENT
Start: 2023-11-29

## 2023-11-22 RX ORDER — SODIUM CHLORIDE 9 MG/ML
INJECTION, SOLUTION INTRAVENOUS CONTINUOUS
OUTPATIENT
Start: 2023-11-29

## 2023-11-22 RX ORDER — ALBUTEROL SULFATE 90 UG/1
4 AEROSOL, METERED RESPIRATORY (INHALATION) PRN
OUTPATIENT
Start: 2023-11-29

## 2023-11-22 ASSESSMENT — PATIENT HEALTH QUESTIONNAIRE - PHQ9
SUM OF ALL RESPONSES TO PHQ QUESTIONS 1-9: 0
2. FEELING DOWN, DEPRESSED OR HOPELESS: 0
SUM OF ALL RESPONSES TO PHQ QUESTIONS 1-9: 0
SUM OF ALL RESPONSES TO PHQ9 QUESTIONS 1 & 2: 0
1. LITTLE INTEREST OR PLEASURE IN DOING THINGS: 0

## 2023-11-22 NOTE — PROGRESS NOTES
M/uL    Hemoglobin 9.4 (L) 11.7 - 15.4 g/dL    Hematocrit 31.3 (L) 35.8 - 46.3 %    MCV 89.2 82.0 - 102.0 FL    MCH 26.8 26.1 - 32.9 PG    MCHC 30.0 (L) 31.4 - 35.0 g/dL    RDW 14.6 11.9 - 14.6 %    Platelets 636 994 - 539 K/uL    MPV 10.8 9.4 - 12.3 FL    nRBC 0.00 0.0 - 0.2 K/uL    Neutrophils % 83 (H) 43 - 78 %    Lymphocytes % 7 (L) 13 - 44 %    Monocytes % 8 4.0 - 12.0 %    Eosinophils % 1 0.5 - 7.8 %    Basophils % 0 0.0 - 2.0 %    Immature Granulocytes 1 0.0 - 5.0 %    Neutrophils Absolute 5.6 1.7 - 8.2 K/UL    Lymphocytes Absolute 0.5 0.5 - 4.6 K/UL    Monocytes Absolute 0.6 0.1 - 1.3 K/UL    Eosinophils Absolute 0.1 0.0 - 0.8 K/UL    Basophils Absolute 0.0 0.0 - 0.2 K/UL    Absolute Immature Granulocyte 0.0 0.0 - 0.5 K/UL    Differential Type AUTOMATED     Comprehensive Metabolic Panel    Collection Time: 11/22/23  3:17 PM   Result Value Ref Range    Sodium 143 133 - 143 mmol/L    Potassium 4.3 3.5 - 5.1 mmol/L    Chloride 114 (H) 101 - 110 mmol/L    CO2 24 21 - 32 mmol/L    Anion Gap 5 2 - 11 mmol/L    Glucose 158 (H) 65 - 100 mg/dL    BUN 31 (H) 8 - 23 MG/DL    Creatinine 1.20 (H) 0.6 - 1.0 MG/DL    Est, Glom Filt Rate 46 (L) >60 ml/min/1.73m2    Calcium 8.9 8.3 - 10.4 MG/DL    Total Bilirubin 0.5 0.2 - 1.1 MG/DL    ALT 28 12 - 65 U/L    AST 24 15 - 37 U/L    Alk Phosphatase 82 50 - 136 U/L    Total Protein 6.5 6.3 - 8.2 g/dL    Albumin 2.9 (L) 3.2 - 4.6 g/dL    Globulin 3.6 2.8 - 4.5 g/dL    Albumin/Globulin Ratio 0.8 0.4 - 1.6     Ferritin    Collection Time: 11/22/23  3:17 PM   Result Value Ref Range    Ferritin 17 8 - 388 NG/ML   Transferrin Saturation    Collection Time: 11/22/23  3:17 PM   Result Value Ref Range    Iron 33 (L) 35 - 150 ug/dL    TIBC 341 250 - 450 ug/dL    TRANSFERRIN SATURATION 10 (L) >20 %       Imaging:  No results found. ASSESSMENT:   Diagnosis Orders   1.  MGUS (monoclonal gammopathy of unknown significance)  Soluble transferrin receptor          Patient Active Problem List

## 2023-11-22 NOTE — PATIENT INSTRUCTIONS
Patient Instructions from Today's Visit    Reason for Visit:  Follow up MGUS    Diagnosis Information:  https://[email or web address] net/about-us/asco-answers-patient-education-materials/eozr-dksysro-xctl-sheets    Plan:  Lab results reviewed     Follow Up:   Follow up 6 months     Recent Lab Results:  Hospital Outpatient Visit on 11/22/2023   Component Date Value Ref Range Status    WBC 11/22/2023 6.7  4.3 - 11.1 K/uL Final    RBC 11/22/2023 3.51 (L)  4.05 - 5.2 M/uL Final    Hemoglobin 11/22/2023 9.4 (L)  11.7 - 15.4 g/dL Final    Hematocrit 11/22/2023 31.3 (L)  35.8 - 46.3 % Final    MCV 11/22/2023 89.2  82.0 - 102.0 FL Final    MCH 11/22/2023 26.8  26.1 - 32.9 PG Final    MCHC 11/22/2023 30.0 (L)  31.4 - 35.0 g/dL Final    RDW 11/22/2023 14.6  11.9 - 14.6 % Final    Platelets 65/12/7883 184  150 - 450 K/uL Final    MPV 11/22/2023 10.8  9.4 - 12.3 FL Final    nRBC 11/22/2023 0.00  0.0 - 0.2 K/uL Final    **Note: Absolute NRBC parameter is now reported with Hemogram**    Neutrophils % 11/22/2023 83 (H)  43 - 78 % Final    Lymphocytes % 11/22/2023 7 (L)  13 - 44 % Final    Monocytes % 11/22/2023 8  4.0 - 12.0 % Final    Eosinophils % 11/22/2023 1  0.5 - 7.8 % Final    Basophils % 11/22/2023 0  0.0 - 2.0 % Final    Immature Granulocytes 11/22/2023 1  0.0 - 5.0 % Final    Neutrophils Absolute 11/22/2023 5.6  1.7 - 8.2 K/UL Final    Lymphocytes Absolute 11/22/2023 0.5  0.5 - 4.6 K/UL Final    Monocytes Absolute 11/22/2023 0.6  0.1 - 1.3 K/UL Final    Eosinophils Absolute 11/22/2023 0.1  0.0 - 0.8 K/UL Final    Basophils Absolute 11/22/2023 0.0  0.0 - 0.2 K/UL Final    Absolute Immature Granulocyte 11/22/2023 0.0  0.0 - 0.5 K/UL Final    Differential Type 11/22/2023 AUTOMATED    Final     Treatment Summary has been discussed and given to patient:   N/A  -------------------------------------------------------------------------------------------------------------------  Please call our office at [phone] if you have any  of

## 2023-11-24 LAB
KAPPA LC FREE SER-MCNC: 50.1 MG/L (ref 3.3–19.4)
KAPPA LC FREE/LAMBDA FREE SER: 1.64 (ref 0.26–1.65)
LAMBDA LC FREE SERPL-MCNC: 30.6 MG/L (ref 5.7–26.3)

## 2023-11-27 LAB
ALBUMIN SERPL ELPH-MCNC: 3.1 G/DL (ref 2.9–4.4)
ALBUMIN/GLOB SERPL: 1.1 (ref 0.7–1.7)
ALPHA1 GLOB SERPL ELPH-MCNC: 0.2 G/DL (ref 0–0.4)
ALPHA2 GLOB SERPL ELPH-MCNC: 0.6 G/DL (ref 0.4–1)
B-GLOBULIN SERPL ELPH-MCNC: 1 G/DL (ref 0.7–1.3)
GAMMA GLOB SERPL ELPH-MCNC: 1 G/DL (ref 0.4–1.8)
GLOBULIN SER-MCNC: 2.9 G/DL (ref 2.2–3.9)
IGA SERPL-MCNC: 285 MG/DL (ref 64–422)
IGG SERPL-MCNC: 899 MG/DL (ref 586–1602)
IGM SERPL-MCNC: 234 MG/DL (ref 26–217)
INTERPRETATION SERPL IEP-IMP: ABNORMAL
M PROTEIN SERPL ELPH-MCNC: ABNORMAL G/DL
PROT SERPL-MCNC: 6 G/DL (ref 6–8.5)
TRANSFERRIN SERPL-SCNC: 50.2 NMOL/L (ref 12.2–27.3)

## 2024-01-19 ENCOUNTER — HOSPITAL ENCOUNTER (OUTPATIENT)
Dept: MAMMOGRAPHY | Age: 79
End: 2024-01-19
Attending: FAMILY MEDICINE
Payer: MEDICARE

## 2024-01-19 VITALS — WEIGHT: 138 LBS | HEIGHT: 62 IN | BODY MASS INDEX: 25.4 KG/M2

## 2024-01-19 DIAGNOSIS — Z12.31 SCREENING MAMMOGRAM FOR HIGH-RISK PATIENT: ICD-10-CM

## 2024-01-19 PROCEDURE — 77063 BREAST TOMOSYNTHESIS BI: CPT

## 2024-01-29 ENCOUNTER — ANCILLARY ORDERS (OUTPATIENT)
Dept: MAMMOGRAPHY | Age: 79
End: 2024-01-29

## 2024-01-29 DIAGNOSIS — R92.8 ABNORMAL SCREENING MAMMOGRAM: Primary | ICD-10-CM

## 2024-01-29 DIAGNOSIS — R92.1 CALCIFICATION OF RIGHT BREAST: ICD-10-CM

## 2024-02-28 ENCOUNTER — HOSPITAL ENCOUNTER (OUTPATIENT)
Dept: MAMMOGRAPHY | Age: 79
Discharge: HOME | End: 2024-03-02
Attending: FAMILY MEDICINE
Payer: MEDICARE

## 2024-02-28 DIAGNOSIS — R92.8 ABNORMAL SCREENING MAMMOGRAM: ICD-10-CM

## 2024-02-28 PROCEDURE — 77065 DX MAMMO INCL CAD UNI: CPT

## 2024-02-28 NOTE — RESULT ENCOUNTER NOTE
Let patient know her mammogram showed some right breast calcifications that they think are benign, but they would like to repeat her mammogram in 6 months.  We will order this for her.

## 2024-04-08 ENCOUNTER — TELEPHONE (OUTPATIENT)
Dept: FAMILY MEDICINE CLINIC | Facility: CLINIC | Age: 79
End: 2024-04-08

## 2024-04-17 ENCOUNTER — OFFICE VISIT (OUTPATIENT)
Dept: FAMILY MEDICINE CLINIC | Facility: CLINIC | Age: 79
End: 2024-04-17

## 2024-04-17 VITALS
HEIGHT: 62 IN | RESPIRATION RATE: 18 BRPM | TEMPERATURE: 98.4 F | WEIGHT: 138 LBS | DIASTOLIC BLOOD PRESSURE: 80 MMHG | BODY MASS INDEX: 25.4 KG/M2 | HEART RATE: 68 BPM | OXYGEN SATURATION: 99 % | SYSTOLIC BLOOD PRESSURE: 178 MMHG

## 2024-04-17 DIAGNOSIS — D50.9 IRON DEFICIENCY ANEMIA, UNSPECIFIED IRON DEFICIENCY ANEMIA TYPE: ICD-10-CM

## 2024-04-17 DIAGNOSIS — I15.9 SECONDARY HYPERTENSION: ICD-10-CM

## 2024-04-17 DIAGNOSIS — R73.9 BLOOD GLUCOSE ELEVATED: ICD-10-CM

## 2024-04-17 DIAGNOSIS — I10 ESSENTIAL HYPERTENSION: Primary | ICD-10-CM

## 2024-04-17 LAB
BASOPHILS # BLD: 0 K/UL (ref 0–0.2)
BASOPHILS NFR BLD: 1 % (ref 0–2)
DIFFERENTIAL METHOD BLD: ABNORMAL
EOSINOPHIL # BLD: 0 K/UL (ref 0–0.8)
EOSINOPHIL NFR BLD: 1 % (ref 0.5–7.8)
ERYTHROCYTE [DISTWIDTH] IN BLOOD BY AUTOMATED COUNT: 26.8 % (ref 11.9–14.6)
EST. AVERAGE GLUCOSE BLD GHB EST-MCNC: 140 MG/DL
HBA1C MFR BLD: 6.5 % (ref 4.8–5.6)
HCT VFR BLD AUTO: 34 % (ref 35.8–46.3)
HGB BLD-MCNC: 9.2 G/DL (ref 11.7–15.4)
IMM GRANULOCYTES # BLD AUTO: 0 K/UL (ref 0–0.5)
IMM GRANULOCYTES NFR BLD AUTO: 1 % (ref 0–5)
LYMPHOCYTES # BLD: 0.5 K/UL (ref 0.5–4.6)
LYMPHOCYTES NFR BLD: 13 % (ref 13–44)
MCH RBC QN AUTO: 24 PG (ref 26.1–32.9)
MCHC RBC AUTO-ENTMCNC: 27.1 G/DL (ref 31.4–35)
MCV RBC AUTO: 88.5 FL (ref 82–102)
MONOCYTES # BLD: 0.4 K/UL (ref 0.1–1.3)
MONOCYTES NFR BLD: 10 % (ref 4–12)
NEUTS SEG # BLD: 3.2 K/UL (ref 1.7–8.2)
NEUTS SEG NFR BLD: 75 % (ref 43–78)
NRBC # BLD: 0 K/UL (ref 0–0.2)
PLATELET # BLD AUTO: 165 K/UL (ref 150–450)
PMV BLD AUTO: 10.3 FL (ref 9.4–12.3)
RBC # BLD AUTO: 3.84 M/UL (ref 4.05–5.2)
WBC # BLD AUTO: 4.2 K/UL (ref 4.3–11.1)

## 2024-04-17 RX ORDER — LOSARTAN POTASSIUM 25 MG/1
25 TABLET ORAL DAILY
Qty: 90 TABLET | Refills: 1 | Status: SHIPPED | OUTPATIENT
Start: 2024-04-17

## 2024-04-17 ASSESSMENT — ENCOUNTER SYMPTOMS
RESPIRATORY NEGATIVE: 1
GASTROINTESTINAL NEGATIVE: 1

## 2024-04-17 ASSESSMENT — PATIENT HEALTH QUESTIONNAIRE - PHQ9
2. FEELING DOWN, DEPRESSED OR HOPELESS: NOT AT ALL
SUM OF ALL RESPONSES TO PHQ QUESTIONS 1-9: 0
SUM OF ALL RESPONSES TO PHQ9 QUESTIONS 1 & 2: 0
1. LITTLE INTEREST OR PLEASURE IN DOING THINGS: NOT AT ALL
SUM OF ALL RESPONSES TO PHQ QUESTIONS 1-9: 0

## 2024-04-17 NOTE — PROGRESS NOTES
Amy Negro MD  [address] [place] [id number]  Phone: [phone]  Fax: [phone]            Today's Date: 4/17/2024    Subjective:   HPI:  Luci Carmona is a 78 y.o. female who presents for transitions of care from the hospital.  I reviewedthe hospital records.  These symptoms are improving. Hospital discharge medications reconciled at today’s visit and current medication is updated.    Her right arm is better today, no redness, no edema.  She does have dry eyes skin that is peeling but no signs symptoms of infection, no fever no chills.  She is concerned about diabetes having elevated blood sugar and placed on insulin in the hospital.  She denies increased thirst increased hunger or increased urination.  Her blood pressure is today reports that she has taken blood pressure medication years ago that resulted with hypotension.  She denies chest pain shortness of breath numbness or tingling.        Hospital note :  Patient patient presented to [facility] ER with increasing pain and swelling in right upper extremity at the forearm that has been ongoing for 3 days.  In emergency room she was found to be hypertensive, labs revealed microcytic anemia.  D-dimer was elevated at 3.15.  X-rays obtained were negative for any acute abnormalities.  Due to elevated D-dimer CTA in the upper extremity was performed and was negative for blood clots but indicated edema concerning for cellulitis.  She was started on Rocephin and vancomycin and admitted for management of cellulitis    General surgery was consulted for possible abscess and determined there was no drainable collection noted on exam or bedside ultrasound.  Leukocytosis was resolved and no fever.    She was also placed on doxycycline 100 mg twice daily for 2 days to complete a full 7-day course of antibiotics after discontinuing the 5-day IV vancomycin.    She also received IV iron x 4 days since while in the hospital and was discharged on oral

## 2024-04-18 NOTE — RESULT ENCOUNTER NOTE
I called patient and was unable to leave voicemail.  Mailbox is full.  Could you please attempt to call again and let her know that her hemoglobin is 9.2 and her hematocrit is 34.0 which is low she does see hematology and has a follow-up with them in May.  If she has any questions she may reach back out she also has follow-up with her PCP on May 10

## 2024-05-10 ENCOUNTER — OFFICE VISIT (OUTPATIENT)
Dept: FAMILY MEDICINE CLINIC | Facility: CLINIC | Age: 79
End: 2024-05-10

## 2024-05-10 VITALS
OXYGEN SATURATION: 97 % | WEIGHT: 142 LBS | SYSTOLIC BLOOD PRESSURE: 164 MMHG | DIASTOLIC BLOOD PRESSURE: 69 MMHG | RESPIRATION RATE: 16 BRPM | HEIGHT: 62 IN | BODY MASS INDEX: 26.13 KG/M2 | TEMPERATURE: 97.7 F | HEART RATE: 82 BPM

## 2024-05-10 DIAGNOSIS — I10 PRIMARY HYPERTENSION: Primary | ICD-10-CM

## 2024-05-10 DIAGNOSIS — I10 ESSENTIAL HYPERTENSION: ICD-10-CM

## 2024-05-10 DIAGNOSIS — D47.2 MGUS (MONOCLONAL GAMMOPATHY OF UNKNOWN SIGNIFICANCE): ICD-10-CM

## 2024-05-10 DIAGNOSIS — E11.9 TYPE 2 DIABETES MELLITUS WITHOUT COMPLICATION, WITHOUT LONG-TERM CURRENT USE OF INSULIN (HCC): ICD-10-CM

## 2024-05-10 DIAGNOSIS — N18.31 CHRONIC RENAL FAILURE, STAGE 3A (HCC): ICD-10-CM

## 2024-05-10 DIAGNOSIS — G89.4 CHRONIC PAIN SYNDROME: ICD-10-CM

## 2024-05-10 DIAGNOSIS — M05.9 RHEUMATOID ARTHRITIS WITH POSITIVE RHEUMATOID FACTOR, INVOLVING UNSPECIFIED SITE (HCC): ICD-10-CM

## 2024-05-10 DIAGNOSIS — L03.113 CELLULITIS OF RIGHT UPPER EXTREMITY: ICD-10-CM

## 2024-05-10 DIAGNOSIS — G89.29 CHRONIC RIGHT-SIDED LOW BACK PAIN WITH RIGHT-SIDED SCIATICA: ICD-10-CM

## 2024-05-10 DIAGNOSIS — E03.9 ACQUIRED HYPOTHYROIDISM: ICD-10-CM

## 2024-05-10 DIAGNOSIS — M54.41 CHRONIC RIGHT-SIDED LOW BACK PAIN WITH RIGHT-SIDED SCIATICA: ICD-10-CM

## 2024-05-10 DIAGNOSIS — D50.8 OTHER IRON DEFICIENCY ANEMIA: ICD-10-CM

## 2024-05-10 PROBLEM — G45.9 TIA ON MEDICATION: Status: RESOLVED | Noted: 2017-01-18 | Resolved: 2024-05-10

## 2024-05-10 RX ORDER — METHOCARBAMOL 500 MG/1
500 TABLET, FILM COATED ORAL 4 TIMES DAILY
Qty: 40 TABLET | Refills: 0 | Status: SHIPPED | OUTPATIENT
Start: 2024-05-10 | End: 2024-05-20

## 2024-05-10 RX ORDER — METHOCARBAMOL 500 MG/1
TABLET, FILM COATED ORAL
COMMUNITY
Start: 2024-03-04

## 2024-05-10 ASSESSMENT — PATIENT HEALTH QUESTIONNAIRE - PHQ9
SUM OF ALL RESPONSES TO PHQ QUESTIONS 1-9: 0
SUM OF ALL RESPONSES TO PHQ QUESTIONS 1-9: 0
1. LITTLE INTEREST OR PLEASURE IN DOING THINGS: NOT AT ALL
SUM OF ALL RESPONSES TO PHQ QUESTIONS 1-9: 0
SUM OF ALL RESPONSES TO PHQ9 QUESTIONS 1 & 2: 0
SUM OF ALL RESPONSES TO PHQ QUESTIONS 1-9: 0
2. FEELING DOWN, DEPRESSED OR HOPELESS: NOT AT ALL

## 2024-05-10 NOTE — PROGRESS NOTES
[facility] [place] [place] [address]  Phone: [phone] Fax [phone]  Amy Negro MD  5/10/2024           Ms. Carmona  is a 78 y.o.  year old  female patient who comes in to follow-up on an elevated blood sugar and elevated blood pressure.  She has never had problems with either problem.  She did have a bout of cellulitis however, for which she was in the hospital.  She did get IV antibiotics and then sent home on home oral antibiotics.  She was seen here in hospital follow-up by our nurse practitioner.  She was started on losartan 25 mg a day by her nurse practitioner and had blood work done and comes in for review on those.      She does also have an anemia and she did have IV iron given in the hospital.      When she was in the hospital they did an A1c and it was 8.3 upon admission.  Recent A1c 6.5 when she was seen here in follow-up recently.    She did have an infusion for her rheumatoid arthritis yesterday but it has not helped a lot.  She is wondering about getting a refill on Robaxin that she takes because it does help her pain.    She has been diagnosed with a monoclonal gammopathy of unknown significance and does get seen at the cancer center.    She does say that her rheumatologist drained the fluid collection on her right arm yesterday but it has recurred.      Ms. 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    Ms. Carmona  has  has a past surgical history that includes Breast biopsy (Right, 10/30/2017); heent; heent; Cataract removal (Left); Hysterectomy (1980); esophgl balo distension dx std w/provocation; Colonoscopy (03/15/2017); Thyroidectomy (06/2005); [facility] STEREO BREAST BX W LOC DEVICE 1ST LESION RIGHT (Right, 10/30/2017); Upper  The patient is a 69y Male complaining of abdominal pain.

## 2024-05-13 DIAGNOSIS — D47.2 MGUS (MONOCLONAL GAMMOPATHY OF UNKNOWN SIGNIFICANCE): Primary | ICD-10-CM

## 2024-05-13 DIAGNOSIS — D52.1 DRUG-INDUCED FOLATE DEFICIENCY ANEMIA: ICD-10-CM

## 2024-05-14 ENCOUNTER — NURSE ONLY (OUTPATIENT)
Dept: FAMILY MEDICINE CLINIC | Facility: CLINIC | Age: 79
End: 2024-05-14

## 2024-05-14 ENCOUNTER — TELEPHONE (OUTPATIENT)
Dept: FAMILY MEDICINE CLINIC | Facility: CLINIC | Age: 79
End: 2024-05-14

## 2024-05-14 VITALS — DIASTOLIC BLOOD PRESSURE: 87 MMHG | SYSTOLIC BLOOD PRESSURE: 200 MMHG

## 2024-05-14 DIAGNOSIS — D50.8 OTHER IRON DEFICIENCY ANEMIA: Primary | ICD-10-CM

## 2024-05-14 DIAGNOSIS — I10 ESSENTIAL HYPERTENSION: Primary | ICD-10-CM

## 2024-05-14 NOTE — PROGRESS NOTES
Pt presents today for a BP recheck.  She states that ever since she got bit by a black spider around [facility], her BP has been higher than normal.  I took her BP on her left arm the first time and got a reading of 213/92, then took it again but on the right arm and got a reading of 200/87.   Since Dr. Negro is not in the office this afternoon, I spoke to Dr. Rodriguez and she advised her to double up on her BP medication until Dr. Negro is notified.   Pt understood and tolerated this with no complications.

## 2024-05-14 NOTE — TELEPHONE ENCOUNTER
Pt came in today for a nurse visit to get her BP rechecked.   She states that ever since she got bit by a black spider around [facility], her BP has been higher than normal.  I took her BP on her left arm the first time and got a reading of 213/92, then took it again but on the right arm and got a reading of 200/87.   Since Dr. Negro was not in the office this afternoon, I spoke to Dr. Rodriguez and she advised her to double up on her BP medication until Dr. Negro is notified.

## 2024-05-20 ENCOUNTER — OFFICE VISIT (OUTPATIENT)
Dept: FAMILY MEDICINE CLINIC | Facility: CLINIC | Age: 79
End: 2024-05-20
Payer: MEDICARE

## 2024-05-20 VITALS
BODY MASS INDEX: 24.84 KG/M2 | HEIGHT: 62 IN | WEIGHT: 135 LBS | TEMPERATURE: 97.4 F | DIASTOLIC BLOOD PRESSURE: 83 MMHG | RESPIRATION RATE: 16 BRPM | SYSTOLIC BLOOD PRESSURE: 168 MMHG | HEART RATE: 75 BPM | OXYGEN SATURATION: 96 %

## 2024-05-20 DIAGNOSIS — G89.4 CHRONIC PAIN SYNDROME: ICD-10-CM

## 2024-05-20 DIAGNOSIS — M05.9 RHEUMATOID ARTHRITIS WITH POSITIVE RHEUMATOID FACTOR, INVOLVING UNSPECIFIED SITE (HCC): ICD-10-CM

## 2024-05-20 DIAGNOSIS — G89.29 CHRONIC RIGHT-SIDED LOW BACK PAIN WITHOUT SCIATICA: ICD-10-CM

## 2024-05-20 DIAGNOSIS — N18.31 CHRONIC RENAL FAILURE, STAGE 3A (HCC): ICD-10-CM

## 2024-05-20 DIAGNOSIS — M54.50 CHRONIC RIGHT-SIDED LOW BACK PAIN WITHOUT SCIATICA: ICD-10-CM

## 2024-05-20 DIAGNOSIS — E11.9 TYPE 2 DIABETES MELLITUS WITHOUT COMPLICATION, WITHOUT LONG-TERM CURRENT USE OF INSULIN (HCC): ICD-10-CM

## 2024-05-20 DIAGNOSIS — I10 ESSENTIAL HYPERTENSION: Primary | ICD-10-CM

## 2024-05-20 DIAGNOSIS — M25.551 RIGHT HIP PAIN: ICD-10-CM

## 2024-05-20 PROCEDURE — 99214 OFFICE O/P EST MOD 30 MIN: CPT | Performed by: FAMILY MEDICINE

## 2024-05-20 PROCEDURE — G8399 PT W/DXA RESULTS DOCUMENT: HCPCS | Performed by: FAMILY MEDICINE

## 2024-05-20 PROCEDURE — 1036F TOBACCO NON-USER: CPT | Performed by: FAMILY MEDICINE

## 2024-05-20 PROCEDURE — 3044F HG A1C LEVEL LT 7.0%: CPT | Performed by: FAMILY MEDICINE

## 2024-05-20 PROCEDURE — 1123F ACP DISCUSS/DSCN MKR DOCD: CPT | Performed by: FAMILY MEDICINE

## 2024-05-20 PROCEDURE — 3077F SYST BP >= 140 MM HG: CPT | Performed by: FAMILY MEDICINE

## 2024-05-20 PROCEDURE — G8427 DOCREV CUR MEDS BY ELIG CLIN: HCPCS | Performed by: FAMILY MEDICINE

## 2024-05-20 PROCEDURE — 3079F DIAST BP 80-89 MM HG: CPT | Performed by: FAMILY MEDICINE

## 2024-05-20 PROCEDURE — G8420 CALC BMI NORM PARAMETERS: HCPCS | Performed by: FAMILY MEDICINE

## 2024-05-20 PROCEDURE — 1090F PRES/ABSN URINE INCON ASSESS: CPT | Performed by: FAMILY MEDICINE

## 2024-05-20 RX ORDER — LOSARTAN POTASSIUM 100 MG/1
100 TABLET ORAL DAILY
Qty: 30 TABLET | Refills: 11 | Status: SHIPPED | OUTPATIENT
Start: 2024-05-20

## 2024-05-20 SDOH — ECONOMIC STABILITY: INCOME INSECURITY: HOW HARD IS IT FOR YOU TO PAY FOR THE VERY BASICS LIKE FOOD, HOUSING, MEDICAL CARE, AND HEATING?: NOT HARD AT ALL

## 2024-05-20 SDOH — ECONOMIC STABILITY: FOOD INSECURITY: WITHIN THE PAST 12 MONTHS, YOU WORRIED THAT YOUR FOOD WOULD RUN OUT BEFORE YOU GOT MONEY TO BUY MORE.: NEVER TRUE

## 2024-05-20 SDOH — ECONOMIC STABILITY: FOOD INSECURITY: WITHIN THE PAST 12 MONTHS, THE FOOD YOU BOUGHT JUST DIDN'T LAST AND YOU DIDN'T HAVE MONEY TO GET MORE.: NEVER TRUE

## 2024-05-20 ASSESSMENT — PATIENT HEALTH QUESTIONNAIRE - PHQ9
SUM OF ALL RESPONSES TO PHQ QUESTIONS 1-9: 0
1. LITTLE INTEREST OR PLEASURE IN DOING THINGS: NOT AT ALL
2. FEELING DOWN, DEPRESSED OR HOPELESS: NOT AT ALL
SUM OF ALL RESPONSES TO PHQ9 QUESTIONS 1 & 2: 0
SUM OF ALL RESPONSES TO PHQ QUESTIONS 1-9: 0

## 2024-05-20 NOTE — PROGRESS NOTES
[facility] [place] [address]  Phone: [phone] Fax [phone]  Amy Negro MD  5/21/2024           Ms. Carmona  is a 78 y.o.  year old  female patient who comes in for follow up on blood pressure. Current med(s) for this are losartan 50 mg daily. Blood pressure runs up at home.  She is not having side effects but she is in a lot of pain from her back and right hip..  She does have rheumatoid arthritis.      Blood sugars have been running 100s-110s at home.             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 10/1/2012    GERD (gastroesophageal reflux disease)     no meds    Goiter     managed with medications    Hypercholesterolemia 10/1/2012    Obesity     PUD (peptic ulcer disease) 10/1/2012    RA (rheumatoid arthritis) (HCC)     Reflux 10/1/2012    Seizures (HCC)     from benadryl    Sleep apnea     no CPAP       Past Surgical History:   Procedure Laterality Date    BREAST BIOPSY Right 10/30/2017    benign    CATARACT REMOVAL Left     COLONOSCOPY  03/15/2017    Dr Gonzalez-[facility]    COLONOSCOPY N/A 7/28/2022    COLONOSCOPY/ POLYPECTOMY performed by Arlet Gonzalez [place] [facility]    ESOPHGL BALO DISTENSION DX STD W/PROVOCATION      11/12/18    HEENT      throat stretched Dr. Sam YANG      thyroid surgery [place]    HYSTERECTOMY (CERVIX STATUS UNKNOWN)  1980    secondary to bleeding; fibroids    MAHAMED STEROTACTIC LOC BREAST BIOPSY RIGHT Right 10/30/2017    MAHAMED STEROTACTIC LOC BREAST BIOPSY RIGHT 10/30/2017 [facility]    THYROIDECTOMY  06/2005    Dr. Buchanan- partial    UPPER GASTROINTESTINAL ENDOSCOPY N/A 7/28/2022    EGD ESOPHAGOGASTRODUODENOSCOPY/ DILATATION performed by Arlet Gonzalez MD at [facility]       Current Outpatient Medications   Medication Sig Dispense Refill    losartan (COZAAR) 100 MG tablet Take 1 tablet by mouth daily 30 tablet 11    methocarbamol (ROBAXIN) 500 MG tablet TAKE 1 TABLET

## 2024-05-22 ENCOUNTER — HOSPITAL ENCOUNTER (OUTPATIENT)
Dept: LAB | Age: 79
Discharge: HOME | End: 2024-05-25
Payer: MEDICARE

## 2024-05-22 ENCOUNTER — OFFICE VISIT (OUTPATIENT)
Dept: ONCOLOGY | Age: 79
End: 2024-05-22
Payer: MEDICARE

## 2024-05-22 VITALS
RESPIRATION RATE: 16 BRPM | TEMPERATURE: 98.5 F | DIASTOLIC BLOOD PRESSURE: 82 MMHG | HEIGHT: 62 IN | HEART RATE: 88 BPM | SYSTOLIC BLOOD PRESSURE: 160 MMHG | OXYGEN SATURATION: 96 % | BODY MASS INDEX: 25.58 KG/M2 | WEIGHT: 139 LBS

## 2024-05-22 DIAGNOSIS — D50.8 OTHER IRON DEFICIENCY ANEMIA: ICD-10-CM

## 2024-05-22 DIAGNOSIS — D47.2 MGUS (MONOCLONAL GAMMOPATHY OF UNKNOWN SIGNIFICANCE): ICD-10-CM

## 2024-05-22 DIAGNOSIS — G89.29 CHRONIC RIGHT-SIDED LOW BACK PAIN WITHOUT SCIATICA: ICD-10-CM

## 2024-05-22 DIAGNOSIS — D52.1 DRUG-INDUCED FOLATE DEFICIENCY ANEMIA: ICD-10-CM

## 2024-05-22 DIAGNOSIS — D47.2 MGUS (MONOCLONAL GAMMOPATHY OF UNKNOWN SIGNIFICANCE): Primary | ICD-10-CM

## 2024-05-22 DIAGNOSIS — M25.551 RIGHT HIP PAIN: ICD-10-CM

## 2024-05-22 DIAGNOSIS — M54.50 CHRONIC RIGHT-SIDED LOW BACK PAIN WITHOUT SCIATICA: ICD-10-CM

## 2024-05-22 LAB
ALBUMIN SERPL-MCNC: 3.1 G/DL (ref 3.2–4.6)
ALBUMIN/GLOB SERPL: 1.1 (ref 1–1.9)
ALP SERPL-CCNC: 90 U/L (ref 35–104)
ALT SERPL-CCNC: 18 U/L (ref 12–65)
ANION GAP SERPL CALC-SCNC: 10 MMOL/L (ref 9–18)
AST SERPL-CCNC: 28 U/L (ref 15–37)
BASOPHILS # BLD: 0 K/UL (ref 0–0.2)
BASOPHILS NFR BLD: 1 % (ref 0–2)
BILIRUB SERPL-MCNC: 0.7 MG/DL (ref 0–1.2)
BUN SERPL-MCNC: 16 MG/DL (ref 8–23)
CALCIUM SERPL-MCNC: 9.5 MG/DL (ref 8.8–10.2)
CHLORIDE SERPL-SCNC: 111 MMOL/L (ref 98–107)
CO2 SERPL-SCNC: 23 MMOL/L (ref 20–28)
CREAT SERPL-MCNC: 0.95 MG/DL (ref 0.6–1.1)
DIFFERENTIAL METHOD BLD: ABNORMAL
EOSINOPHIL # BLD: 0.1 K/UL (ref 0–0.8)
EOSINOPHIL NFR BLD: 2 % (ref 0.5–7.8)
ERYTHROCYTE [DISTWIDTH] IN BLOOD BY AUTOMATED COUNT: 21.4 % (ref 11.9–14.6)
FERRITIN SERPL-MCNC: 201 NG/ML (ref 8–388)
GLOBULIN SER CALC-MCNC: 2.9 G/DL (ref 2.3–3.5)
GLUCOSE SERPL-MCNC: 113 MG/DL (ref 70–99)
HCT VFR BLD AUTO: 36.4 % (ref 35.8–46.3)
HGB BLD-MCNC: 11 G/DL (ref 11.7–15.4)
IMM GRANULOCYTES # BLD AUTO: 0.1 K/UL (ref 0–0.5)
IMM GRANULOCYTES NFR BLD AUTO: 1 % (ref 0–5)
IRON SATN MFR SERPL: 28 % (ref 20–50)
IRON SERPL-MCNC: 63 UG/DL (ref 35–100)
KAPPA LC FREE SER-MCNC: 49.6 MG/L (ref 2.4–20.7)
KAPPA LC FREE/LAMBDA FREE SER: 1.5 (ref 0.2–0.8)
LAMBDA LC FREE SERPL-MCNC: 33.8 MG/L (ref 4.2–27.7)
LYMPHOCYTES # BLD: 0.5 K/UL (ref 0.5–4.6)
LYMPHOCYTES NFR BLD: 10 % (ref 13–44)
MCH RBC QN AUTO: 27.2 PG (ref 26.1–32.9)
MCHC RBC AUTO-ENTMCNC: 30.2 G/DL (ref 31.4–35)
MCV RBC AUTO: 90.1 FL (ref 82–102)
MONOCYTES # BLD: 0.5 K/UL (ref 0.1–1.3)
MONOCYTES NFR BLD: 9 % (ref 4–12)
NEUTS SEG # BLD: 3.9 K/UL (ref 1.7–8.2)
NEUTS SEG NFR BLD: 77 % (ref 43–78)
NRBC # BLD: 0 K/UL (ref 0–0.2)
PLATELET # BLD AUTO: 140 K/UL (ref 150–450)
PMV BLD AUTO: 10.8 FL (ref 9.4–12.3)
POTASSIUM SERPL-SCNC: 3.9 MMOL/L (ref 3.5–5.1)
PROT SERPL-MCNC: 6 G/DL (ref 6.3–8.2)
RBC # BLD AUTO: 4.04 M/UL (ref 4.05–5.2)
SODIUM SERPL-SCNC: 144 MMOL/L (ref 136–145)
TIBC SERPL-MCNC: 227 UG/DL (ref 240–450)
UIBC SERPL-MCNC: 164 UG/DL (ref 112–347)
WBC # BLD AUTO: 5 K/UL (ref 4.3–11.1)

## 2024-05-22 PROCEDURE — 83550 IRON BINDING TEST: CPT

## 2024-05-22 PROCEDURE — G8417 CALC BMI ABV UP PARAM F/U: HCPCS | Performed by: NURSE PRACTITIONER

## 2024-05-22 PROCEDURE — 1123F ACP DISCUSS/DSCN MKR DOCD: CPT | Performed by: NURSE PRACTITIONER

## 2024-05-22 PROCEDURE — 80053 COMPREHEN METABOLIC PANEL: CPT

## 2024-05-22 PROCEDURE — 1090F PRES/ABSN URINE INCON ASSESS: CPT | Performed by: NURSE PRACTITIONER

## 2024-05-22 PROCEDURE — 82784 ASSAY IGA/IGD/IGG/IGM EACH: CPT

## 2024-05-22 PROCEDURE — 99214 OFFICE O/P EST MOD 30 MIN: CPT | Performed by: NURSE PRACTITIONER

## 2024-05-22 PROCEDURE — 36415 COLL VENOUS BLD VENIPUNCTURE: CPT

## 2024-05-22 PROCEDURE — 85025 COMPLETE CBC W/AUTO DIFF WBC: CPT

## 2024-05-22 PROCEDURE — 3077F SYST BP >= 140 MM HG: CPT | Performed by: NURSE PRACTITIONER

## 2024-05-22 PROCEDURE — 84165 PROTEIN E-PHORESIS SERUM: CPT

## 2024-05-22 PROCEDURE — 83521 IG LIGHT CHAINS FREE EACH: CPT

## 2024-05-22 PROCEDURE — 3079F DIAST BP 80-89 MM HG: CPT | Performed by: NURSE PRACTITIONER

## 2024-05-22 PROCEDURE — G8399 PT W/DXA RESULTS DOCUMENT: HCPCS | Performed by: NURSE PRACTITIONER

## 2024-05-22 PROCEDURE — G8427 DOCREV CUR MEDS BY ELIG CLIN: HCPCS | Performed by: NURSE PRACTITIONER

## 2024-05-22 PROCEDURE — 1036F TOBACCO NON-USER: CPT | Performed by: NURSE PRACTITIONER

## 2024-05-22 PROCEDURE — 83540 ASSAY OF IRON: CPT

## 2024-05-22 PROCEDURE — 82728 ASSAY OF FERRITIN: CPT

## 2024-05-22 PROCEDURE — 86334 IMMUNOFIX E-PHORESIS SERUM: CPT

## 2024-05-22 RX ORDER — LATANOPROST 50 UG/ML
SOLUTION/ DROPS OPHTHALMIC
COMMUNITY
Start: 2024-05-05

## 2024-05-22 ASSESSMENT — PATIENT HEALTH QUESTIONNAIRE - PHQ9
SUM OF ALL RESPONSES TO PHQ QUESTIONS 1-9: 0
SUM OF ALL RESPONSES TO PHQ QUESTIONS 1-9: 0
1. LITTLE INTEREST OR PLEASURE IN DOING THINGS: NOT AT ALL
SUM OF ALL RESPONSES TO PHQ QUESTIONS 1-9: 0
SUM OF ALL RESPONSES TO PHQ QUESTIONS 1-9: 0
2. FEELING DOWN, DEPRESSED OR HOPELESS: NOT AT ALL
SUM OF ALL RESPONSES TO PHQ9 QUESTIONS 1 & 2: 0

## 2024-05-22 NOTE — RESULT ENCOUNTER NOTE
Let patient know x-rays of her hip and her low back both show arthritis.  I would like for her to see orthopedics to help determine what next steps would be.  Who would she like to see?

## 2024-05-22 NOTE — PROGRESS NOTES
[facility]: Office Visit New Patient H & P    Chief Complaint:    Chief Complaint   Patient presents with    Follow-up         History of Present Illness:  Ms. Carmona is a 78 y.o. female who presents today for evaluation regarding anemia and MGUS.  She has a history of anemia felt to be related to folate deficiency, she has been on MTX for RA in the past with poor tolerance, and now is on Orencia.  She underwent testing at her PCP which showed elevated polyclonal gammopathy, with an area of \"restricted mobility\" in the IgG and kappa lanes.  On one occasion she did have an M-spike to 0.4 g/dL, but this is was felt to be an MGUS rather than a plasma cell disorder.  Of note, she also has a history of iron deficiency with a history of iron infusions done previously.  No current symptoms.  She needs ongoing follow-up with serial paraprotein labs yearly and CBC/iron studies at least semi-annually, but with her most recent labs done in April we can wait until the fall to recheck these.  She will continue follow-up with Dr. Negro and Dr. Vega for her other chronic medical conditions.       She is here today for routine follow up for MGUS and anemia.  Last month she was admitted for 5 days for RUE cellulitis believed to be r/t a spider bite.  While at [facility] she was found to be iron deficient and received 4 doses of IV iron.  Since discharge she reports some improvement in fatigue.  Appetite is good and weight is stable.  She notes BLE edema that is improving since discharge.  There is ongoing right leg/hip pain that PCP is working up and plain films reveal arthritis and she will be referred to ortho.         Review of Systems:  Constitutional: Negative.   HENT: Negative.   Eyes: Negative.   Respiratory: Negative.   Cardiovascular: Negative.   Gastrointestinal: Negative.   Genitourinary: Negative.   Musculoskeletal: Positive for right leg/hip pain.   Skin: Negative.   Neurological: Negative.

## 2024-05-31 LAB
ALBUMIN SERPL ELPH-MCNC: 3 G/DL (ref 2.9–4.4)
ALBUMIN/GLOB SERPL: 1.2 (ref 0.7–1.7)
ALPHA1 GLOB SERPL ELPH-MCNC: 0.2 G/DL (ref 0–0.4)
ALPHA2 GLOB SERPL ELPH-MCNC: 0.6 G/DL (ref 0.4–1)
B-GLOBULIN SERPL ELPH-MCNC: 0.8 G/DL (ref 0.7–1.3)
GAMMA GLOB SERPL ELPH-MCNC: 1 G/DL (ref 0.4–1.8)
GLOBULIN SER-MCNC: 2.7 G/DL (ref 2.2–3.9)
IGA SERPL-MCNC: 294 MG/DL (ref 64–422)
IGG SERPL-MCNC: 849 MG/DL (ref 586–1602)
IGM SERPL-MCNC: 349 MG/DL (ref 26–217)
INTERPRETATION SERPL IEP-IMP: ABNORMAL
M PROTEIN SERPL ELPH-MCNC: 0.3 G/DL
PROT SERPL-MCNC: 5.7 G/DL (ref 6–8.5)

## 2024-06-03 ENCOUNTER — TELEPHONE (OUTPATIENT)
Dept: FAMILY MEDICINE CLINIC | Facility: CLINIC | Age: 79
End: 2024-06-03

## 2024-06-03 NOTE — TELEPHONE ENCOUNTER
Patient came in to the office she received the letter but she has an Arthritis Dr Alberto she has been out of town due to a death in the family   Please call her   [phone].   She will be home after 4 pm today

## 2024-06-03 NOTE — TELEPHONE ENCOUNTER
No, continue with orthopedics.  I am concerned she may need something done as far as hip replacement.

## 2024-06-03 NOTE — TELEPHONE ENCOUNTER
Pt called reguarding her xrays that show arthritis. Pt states that she already sees and arthritis dr. Does she need to reach out to them instead of ortho? Please advise.

## 2024-07-08 ENCOUNTER — OFFICE VISIT (OUTPATIENT)
Dept: FAMILY MEDICINE CLINIC | Facility: CLINIC | Age: 79
End: 2024-07-08
Payer: MEDICARE

## 2024-07-08 VITALS
HEIGHT: 62 IN | BODY MASS INDEX: 25.03 KG/M2 | HEART RATE: 75 BPM | SYSTOLIC BLOOD PRESSURE: 142 MMHG | RESPIRATION RATE: 16 BRPM | WEIGHT: 136 LBS | DIASTOLIC BLOOD PRESSURE: 62 MMHG | TEMPERATURE: 97.3 F | OXYGEN SATURATION: 97 %

## 2024-07-08 DIAGNOSIS — E03.9 ACQUIRED HYPOTHYROIDISM: ICD-10-CM

## 2024-07-08 DIAGNOSIS — M79.89 LOCALIZED SWELLING OF BOTH LOWER EXTREMITIES: Primary | ICD-10-CM

## 2024-07-08 LAB — TSH, 3RD GENERATION: 0.3 UIU/ML (ref 0.27–4.2)

## 2024-07-08 PROCEDURE — G8399 PT W/DXA RESULTS DOCUMENT: HCPCS | Performed by: FAMILY MEDICINE

## 2024-07-08 PROCEDURE — G8420 CALC BMI NORM PARAMETERS: HCPCS | Performed by: FAMILY MEDICINE

## 2024-07-08 PROCEDURE — 1123F ACP DISCUSS/DSCN MKR DOCD: CPT | Performed by: FAMILY MEDICINE

## 2024-07-08 PROCEDURE — 1090F PRES/ABSN URINE INCON ASSESS: CPT | Performed by: FAMILY MEDICINE

## 2024-07-08 PROCEDURE — G8427 DOCREV CUR MEDS BY ELIG CLIN: HCPCS | Performed by: FAMILY MEDICINE

## 2024-07-08 PROCEDURE — 1036F TOBACCO NON-USER: CPT | Performed by: FAMILY MEDICINE

## 2024-07-08 PROCEDURE — 3077F SYST BP >= 140 MM HG: CPT | Performed by: FAMILY MEDICINE

## 2024-07-08 PROCEDURE — 99214 OFFICE O/P EST MOD 30 MIN: CPT | Performed by: FAMILY MEDICINE

## 2024-07-08 PROCEDURE — 3078F DIAST BP <80 MM HG: CPT | Performed by: FAMILY MEDICINE

## 2024-07-08 RX ORDER — LEVOTHYROXINE SODIUM 0.05 MG/1
TABLET ORAL
Qty: 90 TABLET | Refills: 3 | Status: SHIPPED | OUTPATIENT
Start: 2024-07-08

## 2024-07-08 RX ORDER — FUROSEMIDE 20 MG/1
TABLET ORAL
Qty: 10 TABLET | Refills: 0 | Status: SHIPPED | OUTPATIENT
Start: 2024-07-08

## 2024-07-08 RX ORDER — METHOCARBAMOL 500 MG/1
500 TABLET, FILM COATED ORAL 4 TIMES DAILY
COMMUNITY

## 2024-07-08 RX ORDER — LEVOTHYROXINE SODIUM 0.07 MG/1
75 TABLET ORAL DAILY
Qty: 90 TABLET | Refills: 3 | Status: SHIPPED | OUTPATIENT
Start: 2024-07-08

## 2024-07-08 NOTE — PROGRESS NOTES
0.005 % ophthalmic solution       losartan (COZAAR) 100 MG tablet Take 1 tablet by mouth daily 30 tablet 11    acetaminophen (TYLENOL) 650 MG extended release tablet Take by mouth every 8 (eight) hours      bismuth subsalicylate (PEPTO BISMOL) 262 MG/15ML suspension Take 15 mLs by mouth every 6 hours as needed for Indigestion      predniSONE (DELTASONE) 5 MG tablet Take 1 tablet by mouth daily      abatacept (ORENCIA) 250 MG injection Infuse intravenously once       No current facility-administered medications for this visit.       Ms. Carmona   Social History     Socioeconomic History    Marital status:      Spouse name: None    Number of children: None    Years of education: None    Highest education level: None   Tobacco Use    Smoking status: Never     Passive exposure: Never    Smokeless tobacco: Never   Substance and Sexual Activity    Alcohol use: No    Drug use: No     Social Determinants of Health     Financial Resource Strain: Low Risk  (5/20/2024)    Overall Financial Resource Strain (CARDIA)     Difficulty of Paying Living Expenses: Not hard at all   Food Insecurity: No Food Insecurity (5/20/2024)    Hunger Vital Sign     Worried About Running Out of Food in the Last Year: Never true     Ran Out of Food in the Last Year: Never true   Transportation Needs: Unknown (5/20/2024)    PRAPARE - Transportation     Lack of Transportation (Non-Medical): No   Physical Activity: Insufficiently Active (11/14/2023)    Exercise Vital Sign     Days of Exercise per Week: 2 days     Minutes of Exercise per Session: 10 min   Housing Stability: Unknown (5/20/2024)    Housing Stability Vital Sign     Unstable Housing in the Last Year: No       Ms. Carmona   Family History   Problem Relation Age of Onset    Kidney Disease Mother     Heart Attack Father     Heart Disease Father     Breast Cancer Maternal Aunt 80            Ms. Carmona  has the following allergies:   Allergies   Allergen Reactions    Diphenhydramine

## 2024-07-12 ENCOUNTER — TELEPHONE (OUTPATIENT)
Dept: FAMILY MEDICINE CLINIC | Facility: CLINIC | Age: 79
End: 2024-07-12

## 2024-08-21 ENCOUNTER — OFFICE VISIT (OUTPATIENT)
Dept: FAMILY MEDICINE CLINIC | Facility: CLINIC | Age: 79
End: 2024-08-21

## 2024-08-21 VITALS
SYSTOLIC BLOOD PRESSURE: 178 MMHG | DIASTOLIC BLOOD PRESSURE: 86 MMHG | HEIGHT: 62 IN | OXYGEN SATURATION: 99 % | WEIGHT: 139.4 LBS | BODY MASS INDEX: 25.65 KG/M2 | TEMPERATURE: 97.5 F | HEART RATE: 61 BPM | RESPIRATION RATE: 16 BRPM

## 2024-08-21 DIAGNOSIS — B35.4 TINEA CORPORIS: ICD-10-CM

## 2024-08-21 DIAGNOSIS — I10 UNCONTROLLED HYPERTENSION: Primary | ICD-10-CM

## 2024-08-21 DIAGNOSIS — M79.89 LOCALIZED SWELLING OF BOTH LOWER EXTREMITIES: ICD-10-CM

## 2024-08-21 RX ORDER — HYDROCHLOROTHIAZIDE 12.5 MG/1
12.5 CAPSULE, GELATIN COATED ORAL EVERY MORNING
Qty: 30 CAPSULE | Refills: 5 | Status: SHIPPED | OUTPATIENT
Start: 2024-08-21

## 2024-08-21 RX ORDER — KETOCONAZOLE 20 MG/G
CREAM TOPICAL
Qty: 30 G | Refills: 1 | Status: SHIPPED | OUTPATIENT
Start: 2024-08-21

## 2024-08-21 ASSESSMENT — PATIENT HEALTH QUESTIONNAIRE - PHQ9
2. FEELING DOWN, DEPRESSED OR HOPELESS: NOT AT ALL
SUM OF ALL RESPONSES TO PHQ QUESTIONS 1-9: 0
1. LITTLE INTEREST OR PLEASURE IN DOING THINGS: NOT AT ALL
SUM OF ALL RESPONSES TO PHQ9 QUESTIONS 1 & 2: 0

## 2024-08-21 ASSESSMENT — ENCOUNTER SYMPTOMS
WHEEZING: 0
CHEST TIGHTNESS: 0
SHORTNESS OF BREATH: 0

## 2024-08-21 NOTE — PATIENT INSTRUCTIONS
*Continue Losartan 100 mg once a day.  *Add Hydrochlorothiazide 12.5 mg once a day for blood pressure and to help with swelling.  *Please keep track of your blood pressure. Check it 3-4 days a week. Write down your results and bring them with you to your next office visit for review. We'd like you to stay < 140/90, and ideally < 130/80. Let us know if you find yourself above this routinely.  *Recommend more exercise/aerobic activity to improve skeletal muscle compression of distal fluids into the lymphatics and veins for return to the heart.   *Decrease salt intake when able.  *Recommend wearing knee high support stockings during the day.  *Elevate the feet/legs when able.  *For the rash under your breasts, use the ketoconazole cream as prescribed.

## 2024-08-21 NOTE — PROGRESS NOTES
[facility] [address] [address]  Phone [phone]      Patient: Luci Carmona  YOB: 1945  Age 78 y.o.  Sex female  Medical Record:  [id number]  Visit Date: 08/21/24  Author:  Efren Ye PA-C    Family Practice Clinic Note    Chief Complaint   Patient presents with    Leg Swelling     Bilateral but mainly the right one       History of Present Illness  This is a 78-year-old female who returns today with complaints of persistent lower extremity edema.  She was seen here last month for the same and was given a 7-day course of Lasix.  She states that she took the medication as prescribed and it did seem to help although the swelling did not fully resolved.  It has remained persistent in the feet and ankles and lower legs.  She denies any associated pain.  She has not had any associated chest discomfort, shortness of breath, palpitations, orthopnea.  States that the swelling seems to worsen as the day goes on.  Seems to be a little better in the morning when she first wakes up.  She denies excessive salt intake.  No recent changes in her diet.  She is also concerned about her blood pressure.  Not checking at home but notes that her readings have been elevated recently on visits.  Continues to take losartan 100 mg daily.  She is currently taking prednisone 5 mg daily for her rheumatoid arthritis.    She also notes that she has a recurrent rash beneath her breast.  This has been present for quite some time.  Pruritic at times.  Denies any new or unusual exposures to the area.  States that she was given a cream by Dr. Negro sometime in the past but cannot recall what it was.  It did seem to be helpful.    Past History:    Past Medical history   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 10/1/2012    GERD (gastroesophageal reflux disease)     no meds    Goiter     managed with medications    Hypercholesterolemia

## 2024-08-28 ENCOUNTER — ANCILLARY ORDERS (OUTPATIENT)
Dept: MAMMOGRAPHY | Age: 79
End: 2024-08-28

## 2024-08-28 ENCOUNTER — HOSPITAL ENCOUNTER (OUTPATIENT)
Dept: MAMMOGRAPHY | Age: 79
Discharge: HOME | End: 2024-08-31
Attending: FAMILY MEDICINE
Payer: MEDICARE

## 2024-08-28 VITALS — BODY MASS INDEX: 25.58 KG/M2 | HEIGHT: 62 IN | WEIGHT: 139 LBS

## 2024-08-28 DIAGNOSIS — R92.1 CALCIFICATION OF RIGHT BREAST: ICD-10-CM

## 2024-08-28 DIAGNOSIS — Z12.31 SCREENING MAMMOGRAM FOR HIGH-RISK PATIENT: ICD-10-CM

## 2024-08-28 DIAGNOSIS — R92.8 ABNORMAL SCREENING MAMMOGRAM: Primary | ICD-10-CM

## 2024-08-28 PROCEDURE — G0279 TOMOSYNTHESIS, MAMMO: HCPCS

## 2024-08-28 NOTE — RESULT ENCOUNTER NOTE
Let patient know the calcifications in her right breast are stable and they do not see anything worrisome.  They would like for her to do a bilateral mammogram in 6 months to get her back on her regular schedule.  Will place the order.

## 2024-09-18 ENCOUNTER — OFFICE VISIT (OUTPATIENT)
Dept: FAMILY MEDICINE CLINIC | Facility: CLINIC | Age: 79
End: 2024-09-18

## 2024-09-18 VITALS
TEMPERATURE: 97.9 F | DIASTOLIC BLOOD PRESSURE: 70 MMHG | BODY MASS INDEX: 25.43 KG/M2 | HEIGHT: 62 IN | OXYGEN SATURATION: 98 % | WEIGHT: 138.2 LBS | RESPIRATION RATE: 16 BRPM | HEART RATE: 64 BPM | SYSTOLIC BLOOD PRESSURE: 151 MMHG

## 2024-09-18 DIAGNOSIS — M54.31 RIGHT SIDED SCIATICA: ICD-10-CM

## 2024-09-18 DIAGNOSIS — M79.89 LOCALIZED SWELLING OF BOTH LOWER EXTREMITIES: ICD-10-CM

## 2024-09-18 DIAGNOSIS — I10 ESSENTIAL HYPERTENSION: Primary | ICD-10-CM

## 2024-09-18 ASSESSMENT — PATIENT HEALTH QUESTIONNAIRE - PHQ9
7. TROUBLE CONCENTRATING ON THINGS, SUCH AS READING THE NEWSPAPER OR WATCHING TELEVISION: NOT AT ALL
SUM OF ALL RESPONSES TO PHQ9 QUESTIONS 1 & 2: 1
3. TROUBLE FALLING OR STAYING ASLEEP: NOT AT ALL
2. FEELING DOWN, DEPRESSED OR HOPELESS: NOT AT ALL
SUM OF ALL RESPONSES TO PHQ QUESTIONS 1-9: 1
SUM OF ALL RESPONSES TO PHQ QUESTIONS 1-9: 1
9. THOUGHTS THAT YOU WOULD BE BETTER OFF DEAD, OR OF HURTING YOURSELF: NOT AT ALL
8. MOVING OR SPEAKING SO SLOWLY THAT OTHER PEOPLE COULD HAVE NOTICED. OR THE OPPOSITE, BEING SO FIGETY OR RESTLESS THAT YOU HAVE BEEN MOVING AROUND A LOT MORE THAN USUAL: NOT AT ALL
1. LITTLE INTEREST OR PLEASURE IN DOING THINGS: SEVERAL DAYS
6. FEELING BAD ABOUT YOURSELF - OR THAT YOU ARE A FAILURE OR HAVE LET YOURSELF OR YOUR FAMILY DOWN: NOT AT ALL
4. FEELING TIRED OR HAVING LITTLE ENERGY: NOT AT ALL
5. POOR APPETITE OR OVEREATING: NOT AT ALL
SUM OF ALL RESPONSES TO PHQ QUESTIONS 1-9: 1
10. IF YOU CHECKED OFF ANY PROBLEMS, HOW DIFFICULT HAVE THESE PROBLEMS MADE IT FOR YOU TO DO YOUR WORK, TAKE CARE OF THINGS AT HOME, OR GET ALONG WITH OTHER PEOPLE: NOT DIFFICULT AT ALL
SUM OF ALL RESPONSES TO PHQ QUESTIONS 1-9: 1

## 2024-09-19 ASSESSMENT — ENCOUNTER SYMPTOMS: SHORTNESS OF BREATH: 0

## 2024-09-25 ENCOUNTER — TELEPHONE (OUTPATIENT)
Dept: FAMILY MEDICINE CLINIC | Facility: CLINIC | Age: 79
End: 2024-09-25

## 2024-09-25 DIAGNOSIS — M54.31 RIGHT SIDED SCIATICA: Primary | ICD-10-CM

## 2024-10-18 ENCOUNTER — TELEPHONE (OUTPATIENT)
Dept: FAMILY MEDICINE CLINIC | Facility: CLINIC | Age: 79
End: 2024-10-18

## 2024-11-12 ENCOUNTER — LAB (OUTPATIENT)
Dept: FAMILY MEDICINE CLINIC | Facility: CLINIC | Age: 79
End: 2024-11-12

## 2024-11-12 DIAGNOSIS — I10 ESSENTIAL HYPERTENSION: ICD-10-CM

## 2024-11-12 DIAGNOSIS — E03.9 ACQUIRED HYPOTHYROIDISM: ICD-10-CM

## 2024-11-12 DIAGNOSIS — E53.8 B12 DEFICIENCY: ICD-10-CM

## 2024-11-12 DIAGNOSIS — D50.8 OTHER IRON DEFICIENCY ANEMIA: ICD-10-CM

## 2024-11-12 DIAGNOSIS — E11.9 TYPE 2 DIABETES MELLITUS WITHOUT COMPLICATION, WITHOUT LONG-TERM CURRENT USE OF INSULIN (HCC): Primary | ICD-10-CM

## 2024-11-12 DIAGNOSIS — E78.00 HYPERCHOLESTEROLEMIA: ICD-10-CM

## 2024-11-12 LAB
ALBUMIN SERPL-MCNC: 2.9 G/DL (ref 3.2–4.6)
ALBUMIN/GLOB SERPL: 0.8 (ref 1–1.9)
ALP SERPL-CCNC: 77 U/L (ref 35–104)
ALT SERPL-CCNC: 25 U/L (ref 8–45)
ANION GAP SERPL CALC-SCNC: 12 MMOL/L (ref 7–16)
AST SERPL-CCNC: 40 U/L (ref 15–37)
BASOPHILS # BLD: 0.1 K/UL (ref 0–0.2)
BASOPHILS NFR BLD: 1 % (ref 0–2)
BILIRUB SERPL-MCNC: 0.9 MG/DL (ref 0–1.2)
BUN SERPL-MCNC: 17 MG/DL (ref 8–23)
CALCIUM SERPL-MCNC: 9.6 MG/DL (ref 8.8–10.2)
CHLORIDE SERPL-SCNC: 110 MMOL/L (ref 98–107)
CHOLEST SERPL-MCNC: 278 MG/DL (ref 0–200)
CO2 SERPL-SCNC: 21 MMOL/L (ref 20–29)
CREAT SERPL-MCNC: 1.54 MG/DL (ref 0.6–1.1)
DIFFERENTIAL METHOD BLD: ABNORMAL
EOSINOPHIL # BLD: 0.1 K/UL (ref 0–0.8)
EOSINOPHIL NFR BLD: 1 % (ref 0.5–7.8)
ERYTHROCYTE [DISTWIDTH] IN BLOOD BY AUTOMATED COUNT: 15.9 % (ref 11.9–14.6)
EST. AVERAGE GLUCOSE BLD GHB EST-MCNC: 179 MG/DL
GLOBULIN SER CALC-MCNC: 3.5 G/DL (ref 2.3–3.5)
GLUCOSE SERPL-MCNC: 94 MG/DL (ref 70–99)
HBA1C MFR BLD: 7.9 % (ref 0–5.6)
HCT VFR BLD AUTO: 37.4 % (ref 35.8–46.3)
HDLC SERPL-MCNC: 83 MG/DL (ref 40–60)
HDLC SERPL: 3.4 (ref 0–5)
HGB BLD-MCNC: 11.4 G/DL (ref 11.7–15.4)
IMM GRANULOCYTES # BLD AUTO: 0.1 K/UL (ref 0–0.5)
IMM GRANULOCYTES NFR BLD AUTO: 2 % (ref 0–5)
LDLC SERPL CALC-MCNC: 169 MG/DL (ref 0–100)
LYMPHOCYTES # BLD: 0.9 K/UL (ref 0.5–4.6)
LYMPHOCYTES NFR BLD: 19 % (ref 13–44)
MCH RBC QN AUTO: 30.3 PG (ref 26.1–32.9)
MCHC RBC AUTO-ENTMCNC: 30.5 G/DL (ref 31.4–35)
MCV RBC AUTO: 99.5 FL (ref 82–102)
MONOCYTES # BLD: 0.4 K/UL (ref 0.1–1.3)
MONOCYTES NFR BLD: 10 % (ref 4–12)
NEUTS SEG # BLD: 3.1 K/UL (ref 1.7–8.2)
NEUTS SEG NFR BLD: 67 % (ref 43–78)
NRBC # BLD: 0 K/UL (ref 0–0.2)
PLATELET # BLD AUTO: 147 K/UL (ref 150–450)
PMV BLD AUTO: 11.2 FL (ref 9.4–12.3)
POTASSIUM SERPL-SCNC: 3.8 MMOL/L (ref 3.5–5.1)
PROT SERPL-MCNC: 6.4 G/DL (ref 6.3–8.2)
RBC # BLD AUTO: 3.76 M/UL (ref 4.05–5.2)
SODIUM SERPL-SCNC: 144 MMOL/L (ref 136–145)
TRIGL SERPL-MCNC: 131 MG/DL (ref 0–150)
TSH, 3RD GENERATION: 502 UIU/ML (ref 0.27–4.2)
VIT B12 SERPL-MCNC: 1073 PG/ML (ref 193–986)
VLDLC SERPL CALC-MCNC: 26 MG/DL (ref 6–23)
WBC # BLD AUTO: 4.7 K/UL (ref 4.3–11.1)

## 2024-11-13 ENCOUNTER — TELEPHONE (OUTPATIENT)
Dept: FAMILY MEDICINE CLINIC | Facility: CLINIC | Age: 79
End: 2024-11-13

## 2024-11-13 NOTE — RESULT ENCOUNTER NOTE
Let patient know her thyroid blood work is very much out of range.  Her diabetes is also out of control.  Be sure that she follows up with me on the 19th.  At this time I would like for her to go to taking her Synthroid 75 mcg every day rather than alternating with 50 mcg.

## 2024-11-19 ENCOUNTER — OFFICE VISIT (OUTPATIENT)
Dept: FAMILY MEDICINE CLINIC | Facility: CLINIC | Age: 79
End: 2024-11-19

## 2024-11-19 ENCOUNTER — TELEPHONE (OUTPATIENT)
Dept: FAMILY MEDICINE CLINIC | Facility: CLINIC | Age: 79
End: 2024-11-19

## 2024-11-19 VITALS
BODY MASS INDEX: 24.84 KG/M2 | WEIGHT: 135 LBS | OXYGEN SATURATION: 99 % | HEIGHT: 62 IN | RESPIRATION RATE: 16 BRPM | DIASTOLIC BLOOD PRESSURE: 76 MMHG | SYSTOLIC BLOOD PRESSURE: 165 MMHG | TEMPERATURE: 97.5 F | HEART RATE: 65 BPM

## 2024-11-19 DIAGNOSIS — K21.00 GASTROESOPHAGEAL REFLUX DISEASE WITH ESOPHAGITIS WITHOUT HEMORRHAGE: ICD-10-CM

## 2024-11-19 DIAGNOSIS — Z23 NEED FOR IMMUNIZATION AGAINST INFLUENZA: Primary | ICD-10-CM

## 2024-11-19 DIAGNOSIS — E03.9 ACQUIRED HYPOTHYROIDISM: ICD-10-CM

## 2024-11-19 DIAGNOSIS — Z00.00 MEDICARE ANNUAL WELLNESS VISIT, SUBSEQUENT: ICD-10-CM

## 2024-11-19 DIAGNOSIS — E53.8 B12 DEFICIENCY: ICD-10-CM

## 2024-11-19 DIAGNOSIS — D69.6 THROMBOCYTOPENIA, UNSPECIFIED (HCC): ICD-10-CM

## 2024-11-19 DIAGNOSIS — H91.90 HEARING LOSS, UNSPECIFIED HEARING LOSS TYPE, UNSPECIFIED LATERALITY: ICD-10-CM

## 2024-11-19 DIAGNOSIS — I10 ESSENTIAL HYPERTENSION: ICD-10-CM

## 2024-11-19 DIAGNOSIS — E78.00 HYPERCHOLESTEROLEMIA: ICD-10-CM

## 2024-11-19 DIAGNOSIS — Z87.19 HISTORY OF ESOPHAGEAL STRICTURE: ICD-10-CM

## 2024-11-19 DIAGNOSIS — N18.31 CHRONIC RENAL FAILURE, STAGE 3A (HCC): ICD-10-CM

## 2024-11-19 DIAGNOSIS — E11.65 TYPE 2 DIABETES MELLITUS WITH HYPERGLYCEMIA, WITHOUT LONG-TERM CURRENT USE OF INSULIN (HCC): ICD-10-CM

## 2024-11-19 DIAGNOSIS — M05.9 RHEUMATOID ARTHRITIS WITH POSITIVE RHEUMATOID FACTOR, INVOLVING UNSPECIFIED SITE (HCC): ICD-10-CM

## 2024-11-19 RX ORDER — RABEPRAZOLE SODIUM 20 MG/1
20 TABLET, DELAYED RELEASE ORAL DAILY
Qty: 90 TABLET | Refills: 3 | Status: ON HOLD | OUTPATIENT
Start: 2024-11-19

## 2024-11-19 RX ORDER — PANTOPRAZOLE SODIUM 40 MG/1
40 TABLET, DELAYED RELEASE ORAL DAILY
Qty: 90 TABLET | Refills: 3 | Status: SHIPPED | OUTPATIENT
Start: 2024-11-19 | End: 2024-11-19

## 2024-11-19 RX ORDER — LEVOTHYROXINE SODIUM 75 UG/1
TABLET ORAL
Qty: 90 TABLET | Refills: 3 | Status: ON HOLD | OUTPATIENT
Start: 2024-11-19

## 2024-11-19 ASSESSMENT — PATIENT HEALTH QUESTIONNAIRE - PHQ9
SUM OF ALL RESPONSES TO PHQ QUESTIONS 1-9: 0
SUM OF ALL RESPONSES TO PHQ QUESTIONS 1-9: 0
1. LITTLE INTEREST OR PLEASURE IN DOING THINGS: NOT AT ALL
SUM OF ALL RESPONSES TO PHQ9 QUESTIONS 1 & 2: 0
SUM OF ALL RESPONSES TO PHQ QUESTIONS 1-9: 0
2. FEELING DOWN, DEPRESSED OR HOPELESS: NOT AT ALL
SUM OF ALL RESPONSES TO PHQ QUESTIONS 1-9: 0

## 2024-11-19 NOTE — PROGRESS NOTES
min  Interventions:  She will try to walk more       Hearing Screen:  Do you or your family notice any trouble with your hearing that hasn't been managed with hearing aids?: (!) Yes    Interventions:  Referred to Audiology      ADL's:   Patient reports needing help with:  Select all that apply: (!) Transportation  Interventions:  Her niece for help her    Advanced Directives:  Do you have a Living Will?: (!) No    Intervention:  has NO advanced directive - information provided                     Objective   Vitals:    11/19/24 0910   BP: (!) 165/76   Pulse: 65   Resp: 16   Temp: 97.5 °F (36.4 °C)   SpO2: 99%   Weight: 61.2 kg (135 lb)   Height: 1.575 m (5' 2\")      Body mass index is 24.69 kg/m².        Neck: neck supple and non tender without mass, no thyromegaly or thyroid nodules, no cervical lymphadenopathy   Pulmonary/Chest: clear to auscultation bilaterally- no wheezes, rales or rhonchi, normal air movement, no respiratory distress  Cardiovascular: normal rate, normal S1 and S2, and no gallops.  Diabetic foot exam:   Left Foot:   Visual Exam: normal   Pulse DP: 2+ (normal)   Filament test: normal sensation     Right Foot:   Visual Exam: normal   Pulse DP: 2+ (normal)   Filament test: normal sensation                Allergies   Allergen Reactions    Diphenhydramine Other (See Comments)     Other reaction(s): Anaphylactic shock-Allergy    Lidocaine Other (See Comments)     jittery and lost voice; trembling; stuttering    Amoxicillin Other (See Comments)     stuttering    Atorvastatin Other (See Comments)     Other reaction(s): Myalgia-Intolerance    Famotidine Other (See Comments)     Not being able to talk  Other reaction(s): Other- (not listed) - Allergy  Not being able to talk    Hydrocodone-Acetaminophen Other (See Comments)     affected speech    Infliximab Other (See Comments)     messed up immune system    Methotrexate Other (See Comments)     messed up immune system  Other reaction(s): Other- (not

## 2024-11-19 NOTE — TELEPHONE ENCOUNTER
Patient and family member called to let Dr. Negro know that she took Rabeprazole 20 mg that Dr Negro prescribed for acid reflux and had a reaction to it. States that it caused her to start stuttering. Asking if there is anything else she can take and if we have a sample of it that she can try before she pays for another prescription that she can't take? Please let the patient know.

## 2024-11-19 NOTE — PATIENT INSTRUCTIONS
enter R798 to learn more about \"Hearing Loss: Care Instructions.\"  Current as of: September 27, 2023  Content Version: 14.2  © 2024 [employer].   Care instructions adapted under license by [employer]. If you have questions about a medical condition or this instruction, always ask your healthcare professional. [employer], [employer] disclaims any warranty or liability for your use of this information.           Learning About Activities of Daily Living  What are activities of daily living?     Activities of daily living (ADLs) are the basic self-care tasks you do every day. These include eating, bathing, dressing, and moving around.  As you age, and if you have health problems, you may find that it's harder to do some of these tasks. If so, your doctor can suggest ideas that may help.  To measure what kind of help you may need, your doctor will ask how well you are able to do ADLs. Let your doctor know if there are any tasks that you are having trouble doing. This is an important first step to getting help. And when you have the help you need, you can stay as independent as possible.  How will a doctor assess your ADLs?  Asking about ADLs is part of a routine health checkup your doctor will likely do as you age. Your health check might be done in a doctor's office, in your home, or at a hospital. The goal is to find out if you are having any problems that could make it hard to care for yourself or that make it unsafe for you to be on your own.  To measure your ADLs, your doctor will ask how hard it is for you to do routine tasks. Your doctor may also want to know if you have changed the way you do a task because of a health problem. Your doctor may watch how you:  Walk back and forth.  Keep your balance while you stand or walk.  Move from sitting to standing or from a bed to a chair.  Button or unbutton a shirt or sweater.  Remove and put on your shoes.  It's common to feel a little worried or anxious

## 2024-11-20 NOTE — TELEPHONE ENCOUNTER
She has stuttered with all the other medications for reflux.  I do not know that she has stuttered with Pepcid however so I would like for her to take it twice a day which is an over-the-counter medicine and see if it helps.

## 2024-11-22 ENCOUNTER — APPOINTMENT (OUTPATIENT)
Dept: GENERAL RADIOLOGY | Age: 79
DRG: 683 | End: 2024-11-22
Payer: MEDICARE

## 2024-11-22 ENCOUNTER — OFFICE VISIT (OUTPATIENT)
Dept: ONCOLOGY | Age: 79
End: 2024-11-22
Payer: MEDICARE

## 2024-11-22 ENCOUNTER — HOSPITAL ENCOUNTER (OUTPATIENT)
Dept: LAB | Age: 79
Discharge: HOME | End: 2024-11-22
Payer: MEDICARE

## 2024-11-22 ENCOUNTER — HOSPITAL ENCOUNTER (INPATIENT)
Age: 79
LOS: 3 days | Discharge: HOME | DRG: 683 | End: 2024-11-25
Attending: EMERGENCY MEDICINE | Admitting: INTERNAL MEDICINE
Payer: MEDICARE

## 2024-11-22 ENCOUNTER — APPOINTMENT (OUTPATIENT)
Dept: ULTRASOUND IMAGING | Age: 79
DRG: 683 | End: 2024-11-22
Payer: MEDICARE

## 2024-11-22 VITALS
DIASTOLIC BLOOD PRESSURE: 80 MMHG | HEART RATE: 64 BPM | WEIGHT: 136.7 LBS | BODY MASS INDEX: 25.16 KG/M2 | TEMPERATURE: 97.1 F | SYSTOLIC BLOOD PRESSURE: 159 MMHG | OXYGEN SATURATION: 98 % | RESPIRATION RATE: 16 BRPM | HEIGHT: 62 IN

## 2024-11-22 DIAGNOSIS — E11.65 TYPE 2 DIABETES MELLITUS WITH HYPERGLYCEMIA, WITHOUT LONG-TERM CURRENT USE OF INSULIN (HCC): ICD-10-CM

## 2024-11-22 DIAGNOSIS — N39.0 URINARY TRACT INFECTION WITHOUT HEMATURIA, SITE UNSPECIFIED: ICD-10-CM

## 2024-11-22 DIAGNOSIS — N17.9 ACUTE KIDNEY INJURY (HCC): ICD-10-CM

## 2024-11-22 DIAGNOSIS — E86.0 DEHYDRATION: ICD-10-CM

## 2024-11-22 DIAGNOSIS — D47.2 MGUS (MONOCLONAL GAMMOPATHY OF UNKNOWN SIGNIFICANCE): Primary | ICD-10-CM

## 2024-11-22 DIAGNOSIS — D47.2 MGUS (MONOCLONAL GAMMOPATHY OF UNKNOWN SIGNIFICANCE): ICD-10-CM

## 2024-11-22 DIAGNOSIS — N17.9 ACUTE KIDNEY INJURY (HCC): Primary | ICD-10-CM

## 2024-11-22 DIAGNOSIS — I10 UNCONTROLLED HYPERTENSION: ICD-10-CM

## 2024-11-22 DIAGNOSIS — M79.89 LOCALIZED SWELLING OF BOTH LOWER EXTREMITIES: ICD-10-CM

## 2024-11-22 DIAGNOSIS — D50.8 OTHER IRON DEFICIENCY ANEMIA: ICD-10-CM

## 2024-11-22 DIAGNOSIS — I10 ESSENTIAL HYPERTENSION: ICD-10-CM

## 2024-11-22 PROBLEM — N18.31 ACUTE KIDNEY INJURY SUPERIMPOSED ON STAGE 3A CHRONIC KIDNEY DISEASE (HCC): Status: ACTIVE | Noted: 2024-11-22

## 2024-11-22 LAB
ALBUMIN SERPL-MCNC: 2.9 G/DL (ref 3.2–4.6)
ALBUMIN SERPL-MCNC: 3.2 G/DL (ref 3.2–4.6)
ALBUMIN/GLOB SERPL: 0.9 (ref 1–1.9)
ALBUMIN/GLOB SERPL: 0.9 (ref 1–1.9)
ALP SERPL-CCNC: 82 U/L (ref 35–104)
ALP SERPL-CCNC: 87 U/L (ref 35–104)
ALT SERPL-CCNC: 24 U/L (ref 8–45)
ALT SERPL-CCNC: 24 U/L (ref 8–45)
ANION GAP SERPL CALC-SCNC: 14 MMOL/L (ref 7–16)
ANION GAP SERPL CALC-SCNC: 16 MMOL/L (ref 7–16)
APPEARANCE UR: CLEAR
AST SERPL-CCNC: 33 U/L (ref 15–37)
AST SERPL-CCNC: 38 U/L (ref 15–37)
BACTERIA URNS QL MICRO: ABNORMAL /HPF
BASOPHILS # BLD: 0 K/UL (ref 0–0.2)
BASOPHILS # BLD: 0.1 K/UL (ref 0–0.2)
BASOPHILS NFR BLD: 0 % (ref 0–2)
BASOPHILS NFR BLD: 1 % (ref 0–2)
BILIRUB SERPL-MCNC: 0.9 MG/DL (ref 0–1.2)
BILIRUB SERPL-MCNC: 1 MG/DL (ref 0–1.2)
BILIRUB UR QL: NEGATIVE
BUN SERPL-MCNC: 33 MG/DL (ref 8–23)
BUN SERPL-MCNC: 34 MG/DL (ref 8–23)
CA-I BLD-MCNC: 5.1 MG/DL (ref 4–5.2)
CALCIUM SERPL-MCNC: 10.3 MG/DL (ref 8.8–10.2)
CALCIUM SERPL-MCNC: 10.7 MG/DL (ref 8.8–10.2)
CHLORIDE SERPL-SCNC: 107 MMOL/L (ref 98–107)
CHLORIDE SERPL-SCNC: 107 MMOL/L (ref 98–107)
CO2 SERPL-SCNC: 16 MMOL/L (ref 20–29)
CO2 SERPL-SCNC: 20 MMOL/L (ref 20–29)
COLOR UR: ABNORMAL
CREAT SERPL-MCNC: 2.94 MG/DL (ref 0.6–1.1)
CREAT SERPL-MCNC: 3.02 MG/DL (ref 0.6–1.1)
DIFFERENTIAL METHOD BLD: ABNORMAL
DIFFERENTIAL METHOD BLD: ABNORMAL
EOSINOPHIL # BLD: 0 K/UL (ref 0–0.8)
EOSINOPHIL # BLD: 0 K/UL (ref 0–0.8)
EOSINOPHIL NFR BLD: 0 % (ref 0.5–7.8)
EOSINOPHIL NFR BLD: 0 % (ref 0.5–7.8)
EPI CELLS #/AREA URNS HPF: ABNORMAL /HPF
ERYTHROCYTE [DISTWIDTH] IN BLOOD BY AUTOMATED COUNT: 16.2 % (ref 11.9–14.6)
ERYTHROCYTE [DISTWIDTH] IN BLOOD BY AUTOMATED COUNT: 16.5 % (ref 11.9–14.6)
EST. AVERAGE GLUCOSE BLD GHB EST-MCNC: 174 MG/DL
FERRITIN SERPL-MCNC: 87 NG/ML (ref 8–388)
GLOBULIN SER CALC-MCNC: 3.3 G/DL (ref 2.3–3.5)
GLOBULIN SER CALC-MCNC: 3.6 G/DL (ref 2.3–3.5)
GLUCOSE BLD STRIP.AUTO-MCNC: 120 MG/DL (ref 65–100)
GLUCOSE SERPL-MCNC: 143 MG/DL (ref 70–99)
GLUCOSE SERPL-MCNC: 207 MG/DL (ref 70–99)
GLUCOSE UR STRIP.AUTO-MCNC: NEGATIVE MG/DL
HBA1C MFR BLD: 7.7 % (ref 0–5.6)
HCT VFR BLD AUTO: 34.8 % (ref 35.8–46.3)
HCT VFR BLD AUTO: 40.6 % (ref 35.8–46.3)
HGB BLD-MCNC: 10.9 G/DL (ref 11.7–15.4)
HGB BLD-MCNC: 12.2 G/DL (ref 11.7–15.4)
HGB UR QL STRIP: NEGATIVE
IMM GRANULOCYTES # BLD AUTO: 0.2 K/UL (ref 0–0.5)
IMM GRANULOCYTES # BLD AUTO: 0.2 K/UL (ref 0–0.5)
IMM GRANULOCYTES NFR BLD AUTO: 2 % (ref 0–5)
IMM GRANULOCYTES NFR BLD AUTO: 2 % (ref 0–5)
IRON SATN MFR SERPL: 23 % (ref 20–50)
IRON SERPL-MCNC: 53 UG/DL (ref 35–100)
KAPPA LC FREE SER-MCNC: 58.4 MG/L (ref 2.4–20.7)
KAPPA LC FREE/LAMBDA FREE SER: ABNORMAL (ref 0.2–0.8)
KETONES UR QL STRIP.AUTO: ABNORMAL MG/DL
LAMBDA LC FREE SERPL-MCNC: 47.4 MG/L (ref 4.2–27.7)
LEUKOCYTE ESTERASE UR QL STRIP.AUTO: ABNORMAL
LYMPHOCYTES # BLD: 0.5 K/UL (ref 0.5–4.6)
LYMPHOCYTES # BLD: 0.7 K/UL (ref 0.5–4.6)
LYMPHOCYTES NFR BLD: 6 % (ref 13–44)
LYMPHOCYTES NFR BLD: 8 % (ref 13–44)
MAGNESIUM SERPL-MCNC: 2.3 MG/DL (ref 1.8–2.4)
MCH RBC QN AUTO: 30.6 PG (ref 26.1–32.9)
MCH RBC QN AUTO: 30.7 PG (ref 26.1–32.9)
MCHC RBC AUTO-ENTMCNC: 30 G/DL (ref 31.4–35)
MCHC RBC AUTO-ENTMCNC: 31.3 G/DL (ref 31.4–35)
MCV RBC AUTO: 102.3 FL (ref 82–102)
MCV RBC AUTO: 97.8 FL (ref 82–102)
MONOCYTES # BLD: 0.4 K/UL (ref 0.1–1.3)
MONOCYTES # BLD: 0.7 K/UL (ref 0.1–1.3)
MONOCYTES NFR BLD: 5 % (ref 4–12)
MONOCYTES NFR BLD: 7 % (ref 4–12)
NEUTS SEG # BLD: 7.8 K/UL (ref 1.7–8.2)
NEUTS SEG # BLD: 8.2 K/UL (ref 1.7–8.2)
NEUTS SEG NFR BLD: 83 % (ref 43–78)
NEUTS SEG NFR BLD: 87 % (ref 43–78)
NITRITE UR QL STRIP.AUTO: NEGATIVE
NRBC # BLD: 0 K/UL (ref 0–0.2)
NRBC # BLD: 0 K/UL (ref 0–0.2)
OTHER OBSERVATIONS: ABNORMAL
PH UR STRIP: 5 (ref 5–9)
PLATELET # BLD AUTO: 169 K/UL (ref 150–450)
PLATELET # BLD AUTO: 180 K/UL (ref 150–450)
PMV BLD AUTO: 10.4 FL (ref 9.4–12.3)
PMV BLD AUTO: 10.6 FL (ref 9.4–12.3)
POTASSIUM SERPL-SCNC: 4.5 MMOL/L (ref 3.5–5.1)
POTASSIUM SERPL-SCNC: 4.8 MMOL/L (ref 3.5–5.1)
PROT SERPL-MCNC: 6.2 G/DL (ref 6.3–8.2)
PROT SERPL-MCNC: 6.8 G/DL (ref 6.3–8.2)
PROT UR STRIP-MCNC: NEGATIVE MG/DL
RBC # BLD AUTO: 3.56 M/UL (ref 4.05–5.2)
RBC # BLD AUTO: 3.97 M/UL (ref 4.05–5.2)
RBC #/AREA URNS HPF: ABNORMAL /HPF
SERVICE CMNT-IMP: ABNORMAL
SODIUM SERPL-SCNC: 139 MMOL/L (ref 136–145)
SODIUM SERPL-SCNC: 141 MMOL/L (ref 136–145)
SP GR UR REFRACTOMETRY: 1.02 (ref 1–1.02)
TIBC SERPL-MCNC: 226 UG/DL (ref 240–450)
TSH, 3RD GENERATION: 236 UIU/ML (ref 0.27–4.2)
UIBC SERPL-MCNC: 173 UG/DL (ref 112–347)
UROBILINOGEN UR QL STRIP.AUTO: 1 EU/DL (ref 0.2–1)
WBC # BLD AUTO: 9 K/UL (ref 4.3–11.1)
WBC # BLD AUTO: 9.9 K/UL (ref 4.3–11.1)
WBC URNS QL MICRO: ABNORMAL /HPF

## 2024-11-22 PROCEDURE — G8399 PT W/DXA RESULTS DOCUMENT: HCPCS | Performed by: INTERNAL MEDICINE

## 2024-11-22 PROCEDURE — 93005 ELECTROCARDIOGRAM TRACING: CPT | Performed by: EMERGENCY MEDICINE

## 2024-11-22 PROCEDURE — 83540 ASSAY OF IRON: CPT

## 2024-11-22 PROCEDURE — 2580000003 HC RX 258: Performed by: EMERGENCY MEDICINE

## 2024-11-22 PROCEDURE — G8420 CALC BMI NORM PARAMETERS: HCPCS | Performed by: INTERNAL MEDICINE

## 2024-11-22 PROCEDURE — 83521 IG LIGHT CHAINS FREE EACH: CPT

## 2024-11-22 PROCEDURE — 3079F DIAST BP 80-89 MM HG: CPT | Performed by: INTERNAL MEDICINE

## 2024-11-22 PROCEDURE — 0077U IG PARAPROTEIN QUAL BLD/UR: CPT

## 2024-11-22 PROCEDURE — 96361 HYDRATE IV INFUSION ADD-ON: CPT

## 2024-11-22 PROCEDURE — 80053 COMPREHEN METABOLIC PANEL: CPT

## 2024-11-22 PROCEDURE — 36415 COLL VENOUS BLD VENIPUNCTURE: CPT

## 2024-11-22 PROCEDURE — 2580000003 HC RX 258: Performed by: INTERNAL MEDICINE

## 2024-11-22 PROCEDURE — 83550 IRON BINDING TEST: CPT

## 2024-11-22 PROCEDURE — 82962 GLUCOSE BLOOD TEST: CPT

## 2024-11-22 PROCEDURE — 83036 HEMOGLOBIN GLYCOSYLATED A1C: CPT

## 2024-11-22 PROCEDURE — G8482 FLU IMMUNIZE ORDER/ADMIN: HCPCS | Performed by: INTERNAL MEDICINE

## 2024-11-22 PROCEDURE — 85025 COMPLETE CBC W/AUTO DIFF WBC: CPT

## 2024-11-22 PROCEDURE — 1090F PRES/ABSN URINE INCON ASSESS: CPT | Performed by: INTERNAL MEDICINE

## 2024-11-22 PROCEDURE — 81001 URINALYSIS AUTO W/SCOPE: CPT

## 2024-11-22 PROCEDURE — 99285 EMERGENCY DEPT VISIT HI MDM: CPT

## 2024-11-22 PROCEDURE — 87086 URINE CULTURE/COLONY COUNT: CPT

## 2024-11-22 PROCEDURE — 3077F SYST BP >= 140 MM HG: CPT | Performed by: INTERNAL MEDICINE

## 2024-11-22 PROCEDURE — 6360000002 HC RX W HCPCS: Performed by: EMERGENCY MEDICINE

## 2024-11-22 PROCEDURE — 82784 ASSAY IGA/IGD/IGG/IGM EACH: CPT

## 2024-11-22 PROCEDURE — 71045 X-RAY EXAM CHEST 1 VIEW: CPT

## 2024-11-22 PROCEDURE — 1100000000 HC RM PRIVATE

## 2024-11-22 PROCEDURE — 6370000000 HC RX 637 (ALT 250 FOR IP): Performed by: INTERNAL MEDICINE

## 2024-11-22 PROCEDURE — G8427 DOCREV CUR MEDS BY ELIG CLIN: HCPCS | Performed by: INTERNAL MEDICINE

## 2024-11-22 PROCEDURE — 76770 US EXAM ABDO BACK WALL COMP: CPT

## 2024-11-22 PROCEDURE — 6360000002 HC RX W HCPCS: Performed by: INTERNAL MEDICINE

## 2024-11-22 PROCEDURE — 83735 ASSAY OF MAGNESIUM: CPT

## 2024-11-22 PROCEDURE — 1036F TOBACCO NON-USER: CPT | Performed by: INTERNAL MEDICINE

## 2024-11-22 PROCEDURE — 96374 THER/PROPH/DIAG INJ IV PUSH: CPT

## 2024-11-22 PROCEDURE — 82728 ASSAY OF FERRITIN: CPT

## 2024-11-22 PROCEDURE — 1123F ACP DISCUSS/DSCN MKR DOCD: CPT | Performed by: INTERNAL MEDICINE

## 2024-11-22 PROCEDURE — 1126F AMNT PAIN NOTED NONE PRSNT: CPT | Performed by: INTERNAL MEDICINE

## 2024-11-22 PROCEDURE — 84443 ASSAY THYROID STIM HORMONE: CPT

## 2024-11-22 PROCEDURE — 1159F MED LIST DOCD IN RCRD: CPT | Performed by: INTERNAL MEDICINE

## 2024-11-22 PROCEDURE — 99214 OFFICE O/P EST MOD 30 MIN: CPT | Performed by: INTERNAL MEDICINE

## 2024-11-22 PROCEDURE — 82330 ASSAY OF CALCIUM: CPT

## 2024-11-22 RX ORDER — BISACODYL 10 MG
10 SUPPOSITORY, RECTAL RECTAL DAILY PRN
Status: DISCONTINUED | OUTPATIENT
Start: 2024-11-22 | End: 2024-11-25 | Stop reason: HOSPADM

## 2024-11-22 RX ORDER — HEPARIN SODIUM 5000 [USP'U]/ML
5000 INJECTION, SOLUTION INTRAVENOUS; SUBCUTANEOUS EVERY 8 HOURS
Status: DISCONTINUED | OUTPATIENT
Start: 2024-11-22 | End: 2024-11-25 | Stop reason: HOSPADM

## 2024-11-22 RX ORDER — IBUPROFEN 600 MG/1
1 TABLET ORAL PRN
Status: DISCONTINUED | OUTPATIENT
Start: 2024-11-22 | End: 2024-11-25 | Stop reason: HOSPADM

## 2024-11-22 RX ORDER — ACETAMINOPHEN 325 MG/1
650 TABLET ORAL EVERY 6 HOURS PRN
Status: DISCONTINUED | OUTPATIENT
Start: 2024-11-22 | End: 2024-11-25 | Stop reason: HOSPADM

## 2024-11-22 RX ORDER — LOSARTAN POTASSIUM 50 MG/1
100 TABLET ORAL DAILY
Status: DISCONTINUED | OUTPATIENT
Start: 2024-11-23 | End: 2024-11-25 | Stop reason: HOSPADM

## 2024-11-22 RX ORDER — DEXTROSE MONOHYDRATE 100 MG/ML
INJECTION, SOLUTION INTRAVENOUS CONTINUOUS PRN
Status: DISCONTINUED | OUTPATIENT
Start: 2024-11-22 | End: 2024-11-25 | Stop reason: HOSPADM

## 2024-11-22 RX ORDER — HYDROCHLOROTHIAZIDE 12.5 MG/1
12.5 CAPSULE ORAL EVERY MORNING
Status: DISCONTINUED | OUTPATIENT
Start: 2024-11-23 | End: 2024-11-25 | Stop reason: HOSPADM

## 2024-11-22 RX ORDER — MAGNESIUM HYDROXIDE/ALUMINUM HYDROXICE/SIMETHICONE 120; 1200; 1200 MG/30ML; MG/30ML; MG/30ML
30 SUSPENSION ORAL EVERY 6 HOURS PRN
Status: DISCONTINUED | OUTPATIENT
Start: 2024-11-22 | End: 2024-11-25 | Stop reason: HOSPADM

## 2024-11-22 RX ORDER — SODIUM CHLORIDE 9 MG/ML
INJECTION, SOLUTION INTRAVENOUS PRN
Status: DISCONTINUED | OUTPATIENT
Start: 2024-11-22 | End: 2024-11-25 | Stop reason: HOSPADM

## 2024-11-22 RX ORDER — 0.9 % SODIUM CHLORIDE 0.9 %
500 INTRAVENOUS SOLUTION INTRAVENOUS ONCE
Status: COMPLETED | OUTPATIENT
Start: 2024-11-22 | End: 2024-11-22

## 2024-11-22 RX ORDER — SODIUM CHLORIDE 0.9 % (FLUSH) 0.9 %
5-40 SYRINGE (ML) INJECTION EVERY 12 HOURS SCHEDULED
Status: DISCONTINUED | OUTPATIENT
Start: 2024-11-22 | End: 2024-11-25 | Stop reason: HOSPADM

## 2024-11-22 RX ORDER — ONDANSETRON 4 MG/1
4 TABLET, ORALLY DISINTEGRATING ORAL EVERY 8 HOURS PRN
Status: DISCONTINUED | OUTPATIENT
Start: 2024-11-22 | End: 2024-11-25 | Stop reason: HOSPADM

## 2024-11-22 RX ORDER — POLYETHYLENE GLYCOL 3350 17 G/17G
17 POWDER, FOR SOLUTION ORAL DAILY PRN
Status: DISCONTINUED | OUTPATIENT
Start: 2024-11-22 | End: 2024-11-25 | Stop reason: HOSPADM

## 2024-11-22 RX ORDER — LATANOPROST 50 UG/ML
1 SOLUTION/ DROPS OPHTHALMIC NIGHTLY
Status: DISCONTINUED | OUTPATIENT
Start: 2024-11-22 | End: 2024-11-25 | Stop reason: HOSPADM

## 2024-11-22 RX ORDER — ONDANSETRON 2 MG/ML
4 INJECTION INTRAMUSCULAR; INTRAVENOUS EVERY 6 HOURS PRN
Status: DISCONTINUED | OUTPATIENT
Start: 2024-11-22 | End: 2024-11-25 | Stop reason: HOSPADM

## 2024-11-22 RX ORDER — INSULIN LISPRO 100 [IU]/ML
0-8 INJECTION, SOLUTION INTRAVENOUS; SUBCUTANEOUS
Status: DISCONTINUED | OUTPATIENT
Start: 2024-11-22 | End: 2024-11-23

## 2024-11-22 RX ORDER — LEVOTHYROXINE SODIUM 75 UG/1
75 TABLET ORAL DAILY
Status: DISCONTINUED | OUTPATIENT
Start: 2024-11-23 | End: 2024-11-25 | Stop reason: HOSPADM

## 2024-11-22 RX ORDER — ACETAMINOPHEN 325 MG/1
650 TABLET ORAL EVERY 8 HOURS SCHEDULED
Status: DISCONTINUED | OUTPATIENT
Start: 2024-11-22 | End: 2024-11-25 | Stop reason: HOSPADM

## 2024-11-22 RX ORDER — ACETAMINOPHEN 650 MG/1
650 SUPPOSITORY RECTAL EVERY 6 HOURS PRN
Status: DISCONTINUED | OUTPATIENT
Start: 2024-11-22 | End: 2024-11-25 | Stop reason: HOSPADM

## 2024-11-22 RX ORDER — SODIUM CHLORIDE 9 MG/ML
INJECTION, SOLUTION INTRAVENOUS CONTINUOUS
Status: DISCONTINUED | OUTPATIENT
Start: 2024-11-22 | End: 2024-11-25 | Stop reason: HOSPADM

## 2024-11-22 RX ORDER — PREDNISONE 5 MG/1
5 TABLET ORAL DAILY
Status: DISCONTINUED | OUTPATIENT
Start: 2024-11-23 | End: 2024-11-25 | Stop reason: HOSPADM

## 2024-11-22 RX ORDER — SODIUM CHLORIDE 0.9 % (FLUSH) 0.9 %
5-40 SYRINGE (ML) INJECTION PRN
Status: DISCONTINUED | OUTPATIENT
Start: 2024-11-22 | End: 2024-11-25 | Stop reason: HOSPADM

## 2024-11-22 RX ORDER — PANTOPRAZOLE SODIUM 40 MG/1
40 TABLET, DELAYED RELEASE ORAL
Status: DISCONTINUED | OUTPATIENT
Start: 2024-11-23 | End: 2024-11-25 | Stop reason: HOSPADM

## 2024-11-22 RX ADMIN — SODIUM CHLORIDE: 9 INJECTION, SOLUTION INTRAVENOUS at 22:56

## 2024-11-22 RX ADMIN — HEPARIN SODIUM 5000 UNITS: 5000 INJECTION INTRAVENOUS; SUBCUTANEOUS at 22:49

## 2024-11-22 RX ADMIN — SODIUM CHLORIDE, PRESERVATIVE FREE 10 ML: 5 INJECTION INTRAVENOUS at 23:14

## 2024-11-22 RX ADMIN — ACETAMINOPHEN 650 MG: 325 TABLET ORAL at 22:52

## 2024-11-22 RX ADMIN — CEFTRIAXONE 1000 MG: 1 INJECTION, POWDER, FOR SOLUTION INTRAMUSCULAR; INTRAVENOUS at 19:04

## 2024-11-22 RX ADMIN — SODIUM CHLORIDE 500 ML: 9 INJECTION, SOLUTION INTRAVENOUS at 17:56

## 2024-11-22 NOTE — ED PROVIDER NOTES
Emergency Department Provider Note       PCP: Amy Negro MD   Age: 79 y.o.   Sex: female     DISPOSITION Decision To Admit 11/22/2024 06:59:05 PM    ICD-10-CM    1. Acute kidney injury (HCC)  N17.9       2. Dehydration  E86.0       3. Urinary tract infection without hematuria, site unspecified  N39.0       4. MGUS (monoclonal gammopathy of unknown significance)  D47.2           Medical Decision Making   Also reviewed records where patient had basically normal creatinine of 0.96 months ago.  Is 1.5 at the other emergency department visit 1 month ago and was 3 earlier today.  BUN elevated as well.  Calcium greater than 10.  Appears to have acute kidney injury may be dehydration.  May be related to the MGUS.  Elevated calcium as well.  Unsure if related to any thyroid/parathyroid issues.  Will have some concern for any bony involvement of malignancy.  Will give IV fluids.  Recheck screening lab work.  Check chest x-ray and EKG and thyroid.  Consult to hospitalist for admission.  Patient does have SHANKAR.  Appears to have urinary tract infection as well.  Will hydrate and consult hospitalist for admission     1 or more acute illnesses that pose a threat to life or bodily function.   Discussion with external consultants.  Chronic medical problems impacting care include MGUS.  I independently ordered and reviewed each unique test.    I reviewed external records: ED visit note from an outside group.  I reviewed external records: provider visit note from outside specialist.   See HPI regarding recent ED visits for dehydration another facility and oncology office notes.    I interpreted the X-rays chest x-ray without infiltrate.  ED provider's independent EKG interpretation my interpretation EKG shows a sinus rhythm at 56.  Nonspecific ST-T changes.  No ectopy.  Normal QT interval.  The patient was admitted and I have discussed patient management with the admitting provider.    Exclusion criteria - the patient is NOT

## 2024-11-22 NOTE — PROGRESS NOTES
[facility]: Office Visit New Patient H & P    Chief Complaint:    Chief Complaint   Patient presents with    Follow-up         History of Present Illness:  Ms. Carmona is a 79 y.o. female who presents today for evaluation regarding anemia and MGUS.  She has a history of anemia felt to be related to folate deficiency, she has been on MTX for RA in the past with poor tolerance, and now is on Orencia.  She underwent testing at her PCP which showed elevated polyclonal gammopathy, with an area of \"restricted mobility\" in the IgG and kappa lanes.  On one occasion she did have an M-spike to 0.4 g/dL, but this is was felt to be an MGUS rather than a plasma cell disorder.  Of note, she also has a history of iron deficiency with a history of iron infusions done previously.  No current symptoms.  She needs ongoing follow-up with serial paraprotein labs yearly and CBC/iron studies at least semi-annually, but with her most recent labs done in April we can wait until the fall to recheck these.  She will continue follow-up with Dr. Negro and Dr. Vega for her other chronic medical conditions.      History of Present Illness  The patient is an 79-year-old female who presents for follow-up of anemia and MGUS.    She reports experiencing some dryness. She had previously discontinued her thyroid medication, but resumed it yesterday. She was informed that her thyroid levels were significantly abnormal during a recent hospital visit. She also mentions a visit to the emergency room on 10/18/2024, where she was told she was slightly dehydrated.          Review of Systems:  Constitutional: Positive for fatigue.   HENT: Negative.   Eyes: Negative.   Respiratory: Negative.   Cardiovascular: Negative.   Gastrointestinal: Negative.   Genitourinary: Negative.   Musculoskeletal: Negative.   Skin: Negative.   Neurological: Negative.   Endo/Heme/Allergies: Negative.   Psychiatric/Behavioral: Negative.   All other systems

## 2024-11-22 NOTE — ED TRIAGE NOTES
Patient arrived related abnormal labs- sent by oncologist Dr. Noriega- Dr. Randle had spoken to the provider.

## 2024-11-22 NOTE — PATIENT INSTRUCTIONS
Patient Information from Today's Visit    Diagnosis: MGUS      Follow Up Instructions: Due to your kidney function today, you need to present to the Emergency Department for further evaluation.      Treatment Summary has been discussed and given to patient: N/A      Current Labs:   Hospital Outpatient Visit on 11/22/2024   Component Date Value Ref Range Status    WBC 11/22/2024 9.0  4.3 - 11.1 K/uL Final    RBC 11/22/2024 3.56 (L)  4.05 - 5.2 M/uL Final    Hemoglobin 11/22/2024 10.9 (L)  11.7 - 15.4 g/dL Final    Hematocrit 11/22/2024 34.8 (L)  35.8 - 46.3 % Final    MCV 11/22/2024 97.8  82.0 - 102.0 FL Final    MCH 11/22/2024 30.6  26.1 - 32.9 PG Final    MCHC 11/22/2024 31.3 (L)  31.4 - 35.0 g/dL Final    RDW 11/22/2024 16.2 (H)  11.9 - 14.6 % Final    Platelets 11/22/2024 180  150 - 450 K/uL Final    MPV 11/22/2024 10.4  9.4 - 12.3 FL Final    nRBC 11/22/2024 0.00  0.0 - 0.2 K/uL Final    **Note: Absolute NRBC parameter is now reported with Hemogram**    Neutrophils % 11/22/2024 87 (H)  43 - 78 % Final    Lymphocytes % 11/22/2024 6 (L)  13 - 44 % Final    Monocytes % 11/22/2024 5  4.0 - 12.0 % Final    Eosinophils % 11/22/2024 0 (L)  0.5 - 7.8 % Final    Basophils % 11/22/2024 0  0.0 - 2.0 % Final    Immature Granulocytes % 11/22/2024 2  0.0 - 5.0 % Final    Neutrophils Absolute 11/22/2024 7.8  1.7 - 8.2 K/UL Final    Lymphocytes Absolute 11/22/2024 0.5  0.5 - 4.6 K/UL Final    Monocytes Absolute 11/22/2024 0.4  0.1 - 1.3 K/UL Final    Eosinophils Absolute 11/22/2024 0.0  0.0 - 0.8 K/UL Final    Basophils Absolute 11/22/2024 0.0  0.0 - 0.2 K/UL Final    Immature Granulocytes Absolute 11/22/2024 0.2  0.0 - 0.5 K/UL Final    Differential Type 11/22/2024 AUTOMATED    Final    Sodium 11/22/2024 141  136 - 145 mmol/L Final    Potassium 11/22/2024 4.5  3.5 - 5.1 mmol/L Final    Chloride 11/22/2024 107  98 - 107 mmol/L Final    CO2 11/22/2024 20  20 - 29 mmol/L Final    Anion Gap 11/22/2024 14  7 - 16 mmol/L Final

## 2024-11-23 LAB
ANION GAP SERPL CALC-SCNC: 12 MMOL/L (ref 7–16)
BASOPHILS # BLD: 0 K/UL (ref 0–0.2)
BASOPHILS NFR BLD: 0 % (ref 0–2)
BUN SERPL-MCNC: 33 MG/DL (ref 8–23)
CALCIUM SERPL-MCNC: 9.7 MG/DL (ref 8.8–10.2)
CALCIUM SERPL-MCNC: 9.8 MG/DL (ref 8.8–10.2)
CHLORIDE SERPL-SCNC: 110 MMOL/L (ref 98–107)
CO2 SERPL-SCNC: 21 MMOL/L (ref 20–29)
CREAT SERPL-MCNC: 2.65 MG/DL (ref 0.6–1.1)
DIFFERENTIAL METHOD BLD: ABNORMAL
EKG ATRIAL RATE: 57 BPM
EKG DIAGNOSIS: NORMAL
EKG P AXIS: 85 DEGREES
EKG P-R INTERVAL: 127 MS
EKG Q-T INTERVAL: 411 MS
EKG QRS DURATION: 89 MS
EKG QTC CALCULATION (BAZETT): 397 MS
EKG R AXIS: 37 DEGREES
EKG T AXIS: 137 DEGREES
EKG VENTRICULAR RATE: 56 BPM
EOSINOPHIL # BLD: 0 K/UL (ref 0–0.8)
EOSINOPHIL NFR BLD: 0 % (ref 0.5–7.8)
ERYTHROCYTE [DISTWIDTH] IN BLOOD BY AUTOMATED COUNT: 16.2 % (ref 11.9–14.6)
EST. AVERAGE GLUCOSE BLD GHB EST-MCNC: 177 MG/DL
GLUCOSE BLD STRIP.AUTO-MCNC: 105 MG/DL (ref 65–100)
GLUCOSE BLD STRIP.AUTO-MCNC: 115 MG/DL (ref 65–100)
GLUCOSE BLD STRIP.AUTO-MCNC: 124 MG/DL (ref 65–100)
GLUCOSE BLD STRIP.AUTO-MCNC: 86 MG/DL (ref 65–100)
GLUCOSE SERPL-MCNC: 112 MG/DL (ref 70–99)
HBA1C MFR BLD: 7.8 % (ref 0–5.6)
HCT VFR BLD AUTO: 35 % (ref 35.8–46.3)
HGB BLD-MCNC: 10.9 G/DL (ref 11.7–15.4)
IMM GRANULOCYTES # BLD AUTO: 0.1 K/UL (ref 0–0.5)
IMM GRANULOCYTES NFR BLD AUTO: 2 % (ref 0–5)
LYMPHOCYTES # BLD: 0.5 K/UL (ref 0.5–4.6)
LYMPHOCYTES NFR BLD: 8 % (ref 13–44)
MCH RBC QN AUTO: 30.5 PG (ref 26.1–32.9)
MCHC RBC AUTO-ENTMCNC: 31.1 G/DL (ref 31.4–35)
MCV RBC AUTO: 98 FL (ref 82–102)
MONOCYTES # BLD: 0.5 K/UL (ref 0.1–1.3)
MONOCYTES NFR BLD: 7 % (ref 4–12)
NEUTS SEG # BLD: 5.3 K/UL (ref 1.7–8.2)
NEUTS SEG NFR BLD: 82 % (ref 43–78)
NRBC # BLD: 0 K/UL (ref 0–0.2)
PLATELET # BLD AUTO: 146 K/UL (ref 150–450)
PMV BLD AUTO: 10.2 FL (ref 9.4–12.3)
POTASSIUM SERPL-SCNC: 4.3 MMOL/L (ref 3.5–5.1)
PTH-INTACT SERPL-MCNC: 72 PG/ML (ref 15–65)
RBC # BLD AUTO: 3.57 M/UL (ref 4.05–5.2)
SERVICE CMNT-IMP: ABNORMAL
SERVICE CMNT-IMP: NORMAL
SODIUM SERPL-SCNC: 143 MMOL/L (ref 136–145)
WBC # BLD AUTO: 6.5 K/UL (ref 4.3–11.1)

## 2024-11-23 PROCEDURE — 85025 COMPLETE CBC W/AUTO DIFF WBC: CPT

## 2024-11-23 PROCEDURE — 93010 ELECTROCARDIOGRAM REPORT: CPT | Performed by: INTERNAL MEDICINE

## 2024-11-23 PROCEDURE — 80048 BASIC METABOLIC PNL TOTAL CA: CPT

## 2024-11-23 PROCEDURE — 6370000000 HC RX 637 (ALT 250 FOR IP): Performed by: FAMILY MEDICINE

## 2024-11-23 PROCEDURE — 6360000002 HC RX W HCPCS: Performed by: INTERNAL MEDICINE

## 2024-11-23 PROCEDURE — 2580000003 HC RX 258: Performed by: INTERNAL MEDICINE

## 2024-11-23 PROCEDURE — 36415 COLL VENOUS BLD VENIPUNCTURE: CPT

## 2024-11-23 PROCEDURE — 83036 HEMOGLOBIN GLYCOSYLATED A1C: CPT

## 2024-11-23 PROCEDURE — 1100000000 HC RM PRIVATE

## 2024-11-23 PROCEDURE — 83970 ASSAY OF PARATHORMONE: CPT

## 2024-11-23 PROCEDURE — 82962 GLUCOSE BLOOD TEST: CPT

## 2024-11-23 PROCEDURE — 6370000000 HC RX 637 (ALT 250 FOR IP): Performed by: INTERNAL MEDICINE

## 2024-11-23 RX ORDER — AMLODIPINE BESYLATE 5 MG/1
5 TABLET ORAL DAILY
Status: DISCONTINUED | OUTPATIENT
Start: 2024-11-23 | End: 2024-11-25 | Stop reason: HOSPADM

## 2024-11-23 RX ORDER — INSULIN LISPRO 100 [IU]/ML
0-4 INJECTION, SOLUTION INTRAVENOUS; SUBCUTANEOUS
Status: DISCONTINUED | OUTPATIENT
Start: 2024-11-23 | End: 2024-11-25 | Stop reason: HOSPADM

## 2024-11-23 RX ADMIN — HEPARIN SODIUM 5000 UNITS: 5000 INJECTION INTRAVENOUS; SUBCUTANEOUS at 21:56

## 2024-11-23 RX ADMIN — ACETAMINOPHEN 650 MG: 325 TABLET ORAL at 06:30

## 2024-11-23 RX ADMIN — LATANOPROST 1 DROP: 50 SOLUTION OPHTHALMIC at 21:56

## 2024-11-23 RX ADMIN — HYDROCHLOROTHIAZIDE 12.5 MG: 12.5 CAPSULE ORAL at 08:44

## 2024-11-23 RX ADMIN — AMLODIPINE BESYLATE 5 MG: 5 TABLET ORAL at 13:08

## 2024-11-23 RX ADMIN — PANTOPRAZOLE SODIUM 40 MG: 40 TABLET, DELAYED RELEASE ORAL at 06:31

## 2024-11-23 RX ADMIN — SODIUM CHLORIDE: 9 INJECTION, SOLUTION INTRAVENOUS at 00:14

## 2024-11-23 RX ADMIN — PREDNISONE 5 MG: 5 TABLET ORAL at 08:44

## 2024-11-23 RX ADMIN — SODIUM CHLORIDE, PRESERVATIVE FREE 10 ML: 5 INJECTION INTRAVENOUS at 08:50

## 2024-11-23 RX ADMIN — HEPARIN SODIUM 5000 UNITS: 5000 INJECTION INTRAVENOUS; SUBCUTANEOUS at 17:21

## 2024-11-23 RX ADMIN — CEFTRIAXONE 1000 MG: 1 INJECTION, POWDER, FOR SOLUTION INTRAMUSCULAR; INTRAVENOUS at 17:20

## 2024-11-23 RX ADMIN — LEVOTHYROXINE SODIUM 75 MCG: 0.07 TABLET ORAL at 06:31

## 2024-11-23 RX ADMIN — ACETAMINOPHEN 650 MG: 325 TABLET ORAL at 21:58

## 2024-11-23 RX ADMIN — SODIUM CHLORIDE: 9 INJECTION, SOLUTION INTRAVENOUS at 13:35

## 2024-11-23 RX ADMIN — ACETAMINOPHEN 650 MG: 325 TABLET ORAL at 13:08

## 2024-11-23 RX ADMIN — HEPARIN SODIUM 5000 UNITS: 5000 INJECTION INTRAVENOUS; SUBCUTANEOUS at 06:30

## 2024-11-23 ASSESSMENT — PAIN SCALES - GENERAL: PAINLEVEL_OUTOF10: 0

## 2024-11-23 NOTE — CONSULTS
Nephrology consult    Admission Date:  11/22/2024    Admission Diagnosis  Dehydration [E86.0]  MGUS (monoclonal gammopathy of unknown significance) [D47.2]  Acute kidney injury (HCC) [N17.9]  Urinary tract infection without hematuria, site unspecified [N39.0]  Acute kidney injury superimposed on stage 3a chronic kidney disease (HCC) [N17.9, N18.31]    We are asked by     History of Present Illness:    79-year-old female with past medical history of CKD stage III, hyperlipidemia, history of esophageal stricture, history of nephrolithiasis, hypothyroidism, iron deficiency anemia, rheumatoid arthritis treated with methotrexate in the past and now on Orencia, cholelithiasis, peptic ulcer disease, tubulovillous adenoma, diabetes, hypertension, hearing loss is admitted for rising creatinine to 2.9 from his baseline which was 1.5    Patient seen and examined in room.  She is not seeing any nephrologist in the outpatient setting.  She denies any use of NSAIDs.  She reports of kidney stones long time back.  She reports that she has been on general health recently.  She is not aware of any kidney problem from the past    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 10/1/2012    GERD (gastroesophageal reflux disease)     no meds    Goiter     managed with medications    Hypercholesterolemia 10/1/2012    Obesity     PUD (peptic ulcer disease) 10/1/2012    RA (rheumatoid arthritis) (HCC)     Reflux 10/1/2012    Seizures (HCC)     from benadryl    Sleep apnea     no CPAP      Past Surgical History:   Procedure Laterality Date    BREAST BIOPSY Right 10/30/2017    benign    CATARACT REMOVAL Left     COLONOSCOPY  03/15/2017    Dr Gonzalez-[facility]    COLONOSCOPY N/A 7/28/2022    COLONOSCOPY/ POLYPECTOMY performed by Arlet Gonzalez MD at [facility]    ESOPHGL BALO DISTENSION DX STD W/PROVOCATION      11/12/18    HEENT      throat stretched Dr. Sam YANG      thyroid surgery [facility]

## 2024-11-23 NOTE — ED NOTES
TRANSFER - OUT REPORT:    Verbal report given to RN on Luci Carmona  being transferred to [facility] [place] for routine progression of patient care       Report consisted of patient's Situation, Background, Assessment and   Recommendations(SBAR).     Information from the following report(s) Nurse Handoff Report, ED Encounter Summary, ED SBAR, MAR, and Recent Results was reviewed with the receiving nurse.    [place] Fall Assessment:    Presents to emergency department  because of falls (Syncope, seizure, or loss of consciousness): No  Age > 70: Yes  Altered Mental Status, Intoxication with alcohol or substance confusion (Disorientation, impaired judgment, poor safety awaremess, or inability to follow instructions): No  Impaired Mobility: Ambulates or transfers with assistive devices or assistance; Unable to ambulate or transer.: Yes  Nursing Judgement: Yes          Lines:   Peripheral IV 11/22/24 Right Antecubital (Active)        Opportunity for questions and clarification was provided.      Patient transported with:  Yvette Ladd RN  11/22/24 2024

## 2024-11-23 NOTE — H&P
Hospitalist History and Physical   Admit Date:  2024  4:46 PM   Name:  Luci Carmona   Age:  79 y.o.  Sex:  female  :  1945   MRN:  [id number]   Room:  [place] [address]    Presenting/Chief Complaint: Labs Only     Reason(s) for Admission: Acute kidney injury superimposed on stage 3a chronic kidney disease (HCC) [N17.9, N18.31]     History of Present Illness:   Luci Carmona is a 79 y.o. 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 patient was recently seen by PCP and started on metformin which she has not taken any medicine for her diabetes recently and metformin was restarted 500 mg daily which she started taking recently.Patient with Dr. Noriega hematologist spoke with Dr. Cyn pardo in the ED and sent the patient over because of renal dysfunction.  Hospital service called to evaluate admit this patient      Assessment & Plan:     Principal Problem:    Acute kidney injury superimposed on stage 3a chronic kidney disease (HCC)  Normal saline at 75 cc an hour  Hold losartan  Hold metformin  Obtain renal ultrasound  Nephrology consult in a.m.    Mild

## 2024-11-23 NOTE — PLAN OF CARE
Problem: Chronic Conditions and Co-morbidities  Goal: Patient's chronic conditions and co-morbidity symptoms are monitored and maintained or improved  11/22/2024 2141 by Anthony Conde RN  Outcome: Not Progressing  11/22/2024 2140 by Anthony Conde RN  Outcome: Not Progressing     Problem: Discharge Planning  Goal: Discharge to home or other facility with appropriate resources  11/22/2024 2141 by Anthony Conde RN  Outcome: Not Progressing  11/22/2024 2140 by Anthony Conde RN  Outcome: Not Progressing     Problem: Metabolic/Fluid and Electrolytes - Adult  Goal: Electrolytes maintained within normal limits  Outcome: Not Progressing  Goal: Hemodynamic stability and optimal renal function maintained  Outcome: Not Progressing  Goal: Glucose maintained within prescribed range  Outcome: Not Progressing     Problem: Hematologic - Adult  Goal: Maintains hematologic stability  Outcome: Not Progressing

## 2024-11-24 PROBLEM — N18.32 STAGE 3B CHRONIC KIDNEY DISEASE (HCC): Chronic | Status: ACTIVE | Noted: 2024-11-24

## 2024-11-24 PROBLEM — E11.65 TYPE 2 DIABETES MELLITUS WITH HYPERGLYCEMIA, WITHOUT LONG-TERM CURRENT USE OF INSULIN (HCC): Chronic | Status: ACTIVE | Noted: 2024-11-19

## 2024-11-24 PROBLEM — I10 ESSENTIAL HYPERTENSION: Chronic | Status: ACTIVE | Noted: 2024-05-20

## 2024-11-24 LAB
ANION GAP SERPL CALC-SCNC: 11 MMOL/L (ref 7–16)
BACTERIA SPEC CULT: NORMAL
BASOPHILS # BLD: 0 K/UL (ref 0–0.2)
BASOPHILS NFR BLD: 1 % (ref 0–2)
BUN SERPL-MCNC: 31 MG/DL (ref 8–23)
CALCIUM SERPL-MCNC: 9.1 MG/DL (ref 8.8–10.2)
CHLORIDE SERPL-SCNC: 112 MMOL/L (ref 98–107)
CO2 SERPL-SCNC: 20 MMOL/L (ref 20–29)
CREAT SERPL-MCNC: 2.06 MG/DL (ref 0.6–1.1)
DIFFERENTIAL METHOD BLD: ABNORMAL
EOSINOPHIL # BLD: 0.1 K/UL (ref 0–0.8)
EOSINOPHIL NFR BLD: 1 % (ref 0.5–7.8)
ERYTHROCYTE [DISTWIDTH] IN BLOOD BY AUTOMATED COUNT: 16.2 % (ref 11.9–14.6)
GLUCOSE BLD STRIP.AUTO-MCNC: 134 MG/DL (ref 65–100)
GLUCOSE BLD STRIP.AUTO-MCNC: 150 MG/DL (ref 65–100)
GLUCOSE BLD STRIP.AUTO-MCNC: 184 MG/DL (ref 65–100)
GLUCOSE BLD STRIP.AUTO-MCNC: 80 MG/DL (ref 65–100)
GLUCOSE SERPL-MCNC: 95 MG/DL (ref 70–99)
HCT VFR BLD AUTO: 36 % (ref 35.8–46.3)
HGB BLD-MCNC: 11 G/DL (ref 11.7–15.4)
IMM GRANULOCYTES # BLD AUTO: 0.3 K/UL (ref 0–0.5)
IMM GRANULOCYTES NFR BLD AUTO: 5 % (ref 0–5)
LYMPHOCYTES # BLD: 0.7 K/UL (ref 0.5–4.6)
LYMPHOCYTES NFR BLD: 14 % (ref 13–44)
MCH RBC QN AUTO: 30.4 PG (ref 26.1–32.9)
MCHC RBC AUTO-ENTMCNC: 30.6 G/DL (ref 31.4–35)
MCV RBC AUTO: 99.4 FL (ref 82–102)
MONOCYTES # BLD: 0.4 K/UL (ref 0.1–1.3)
MONOCYTES NFR BLD: 9 % (ref 4–12)
NEUTS SEG # BLD: 3.3 K/UL (ref 1.7–8.2)
NEUTS SEG NFR BLD: 70 % (ref 43–78)
NRBC # BLD: 0 K/UL (ref 0–0.2)
PLATELET # BLD AUTO: 148 K/UL (ref 150–450)
PMV BLD AUTO: 10.5 FL (ref 9.4–12.3)
POTASSIUM SERPL-SCNC: 4.3 MMOL/L (ref 3.5–5.1)
RBC # BLD AUTO: 3.62 M/UL (ref 4.05–5.2)
SERVICE CMNT-IMP: ABNORMAL
SERVICE CMNT-IMP: NORMAL
SERVICE CMNT-IMP: NORMAL
SODIUM SERPL-SCNC: 143 MMOL/L (ref 136–145)
WBC # BLD AUTO: 4.8 K/UL (ref 4.3–11.1)

## 2024-11-24 PROCEDURE — 36415 COLL VENOUS BLD VENIPUNCTURE: CPT

## 2024-11-24 PROCEDURE — 6370000000 HC RX 637 (ALT 250 FOR IP): Performed by: FAMILY MEDICINE

## 2024-11-24 PROCEDURE — 2580000003 HC RX 258: Performed by: INTERNAL MEDICINE

## 2024-11-24 PROCEDURE — 82962 GLUCOSE BLOOD TEST: CPT

## 2024-11-24 PROCEDURE — 1100000000 HC RM PRIVATE

## 2024-11-24 PROCEDURE — 6370000000 HC RX 637 (ALT 250 FOR IP): Performed by: INTERNAL MEDICINE

## 2024-11-24 PROCEDURE — 80048 BASIC METABOLIC PNL TOTAL CA: CPT

## 2024-11-24 PROCEDURE — 6360000002 HC RX W HCPCS: Performed by: INTERNAL MEDICINE

## 2024-11-24 PROCEDURE — 85025 COMPLETE CBC W/AUTO DIFF WBC: CPT

## 2024-11-24 RX ADMIN — LEVOTHYROXINE SODIUM 75 MCG: 0.07 TABLET ORAL at 06:33

## 2024-11-24 RX ADMIN — CEFTRIAXONE 1000 MG: 1 INJECTION, POWDER, FOR SOLUTION INTRAMUSCULAR; INTRAVENOUS at 17:28

## 2024-11-24 RX ADMIN — LATANOPROST 1 DROP: 50 SOLUTION OPHTHALMIC at 21:39

## 2024-11-24 RX ADMIN — HEPARIN SODIUM 5000 UNITS: 5000 INJECTION INTRAVENOUS; SUBCUTANEOUS at 13:51

## 2024-11-24 RX ADMIN — PANTOPRAZOLE SODIUM 40 MG: 40 TABLET, DELAYED RELEASE ORAL at 06:33

## 2024-11-24 RX ADMIN — SODIUM CHLORIDE, PRESERVATIVE FREE 10 ML: 5 INJECTION INTRAVENOUS at 21:39

## 2024-11-24 RX ADMIN — SODIUM CHLORIDE: 9 INJECTION, SOLUTION INTRAVENOUS at 16:37

## 2024-11-24 RX ADMIN — ACETAMINOPHEN 650 MG: 325 TABLET ORAL at 21:33

## 2024-11-24 RX ADMIN — HEPARIN SODIUM 5000 UNITS: 5000 INJECTION INTRAVENOUS; SUBCUTANEOUS at 06:33

## 2024-11-24 RX ADMIN — PREDNISONE 5 MG: 5 TABLET ORAL at 08:26

## 2024-11-24 RX ADMIN — HEPARIN SODIUM 5000 UNITS: 5000 INJECTION INTRAVENOUS; SUBCUTANEOUS at 21:32

## 2024-11-24 RX ADMIN — INSULIN LISPRO 1 UNITS: 100 INJECTION, SOLUTION INTRAVENOUS; SUBCUTANEOUS at 21:32

## 2024-11-24 RX ADMIN — ACETAMINOPHEN 650 MG: 325 TABLET ORAL at 06:33

## 2024-11-24 RX ADMIN — ACETAMINOPHEN 650 MG: 325 TABLET ORAL at 13:51

## 2024-11-24 RX ADMIN — SODIUM CHLORIDE: 9 INJECTION, SOLUTION INTRAVENOUS at 03:09

## 2024-11-24 RX ADMIN — AMLODIPINE BESYLATE 5 MG: 5 TABLET ORAL at 08:26

## 2024-11-24 RX ADMIN — SODIUM CHLORIDE, PRESERVATIVE FREE 10 ML: 5 INJECTION INTRAVENOUS at 08:26

## 2024-11-24 NOTE — PLAN OF CARE
Problem: Chronic Conditions and Co-morbidities  Goal: Patient's chronic conditions and co-morbidity symptoms are monitored and maintained or improved  Outcome: Progressing     Problem: Discharge Planning  Goal: Discharge to home or other facility with appropriate resources  Outcome: Progressing     Problem: Skin/Tissue Integrity  Goal: Absence of new skin breakdown  Description: 1.  Monitor for areas of redness and/or skin breakdown  2.  Assess vascular access sites hourly  3.  Every 4-6 hours minimum:  Change oxygen saturation probe site  4.  Every 4-6 hours:  If on nasal continuous positive airway pressure, respiratory therapy assess nares and determine need for appliance change or resting period.  Outcome: Progressing     Problem: Safety - Adult  Goal: Free from fall injury  Outcome: Progressing  Flowsheets (Taken 11/24/2024 7439)  Free From Fall Injury: Instruct family/caregiver on patient safety     Problem: Metabolic/Fluid and Electrolytes - Adult  Goal: Electrolytes maintained within normal limits  Outcome: Progressing  Goal: Hemodynamic stability and optimal renal function maintained  Outcome: Progressing  Goal: Glucose maintained within prescribed range  Outcome: Progressing     Problem: Hematologic - Adult  Goal: Maintains hematologic stability  Outcome: Progressing

## 2024-11-25 VITALS
SYSTOLIC BLOOD PRESSURE: 165 MMHG | DIASTOLIC BLOOD PRESSURE: 76 MMHG | TEMPERATURE: 98.2 F | BODY MASS INDEX: 25.03 KG/M2 | OXYGEN SATURATION: 98 % | HEART RATE: 65 BPM | HEIGHT: 62 IN | RESPIRATION RATE: 18 BRPM | WEIGHT: 136 LBS

## 2024-11-25 LAB
ANION GAP SERPL CALC-SCNC: 9 MMOL/L (ref 7–16)
BASOPHILS # BLD: 0 K/UL (ref 0–0.2)
BASOPHILS NFR BLD: 1 % (ref 0–2)
BUN SERPL-MCNC: 24 MG/DL (ref 8–23)
CALCIUM SERPL-MCNC: 8.6 MG/DL (ref 8.8–10.2)
CHLORIDE SERPL-SCNC: 116 MMOL/L (ref 98–107)
CO2 SERPL-SCNC: 20 MMOL/L (ref 20–29)
CREAT SERPL-MCNC: 1.74 MG/DL (ref 0.6–1.1)
DIFFERENTIAL METHOD BLD: ABNORMAL
EOSINOPHIL # BLD: 0 K/UL (ref 0–0.8)
EOSINOPHIL NFR BLD: 1 % (ref 0.5–7.8)
ERYTHROCYTE [DISTWIDTH] IN BLOOD BY AUTOMATED COUNT: 16.4 % (ref 11.9–14.6)
GLUCOSE BLD STRIP.AUTO-MCNC: 142 MG/DL (ref 65–100)
GLUCOSE BLD STRIP.AUTO-MCNC: 78 MG/DL (ref 65–100)
GLUCOSE SERPL-MCNC: 72 MG/DL (ref 70–99)
HCT VFR BLD AUTO: 32.8 % (ref 35.8–46.3)
HGB BLD-MCNC: 10 G/DL (ref 11.7–15.4)
IMM GRANULOCYTES # BLD AUTO: 0.2 K/UL (ref 0–0.5)
IMM GRANULOCYTES NFR BLD AUTO: 6 % (ref 0–5)
KAPPA LC FREE SER-MCNC: 58.4 MG/L (ref 2.4–20.7)
KAPPA LC FREE/LAMBDA FREE SER: 1.2 (ref 0.2–0.8)
LAMBDA LC FREE SERPL-MCNC: 47.4 MG/L (ref 4.2–27.7)
LYMPHOCYTES # BLD: 0.5 K/UL (ref 0.5–4.6)
LYMPHOCYTES NFR BLD: 13 % (ref 13–44)
MCH RBC QN AUTO: 30.8 PG (ref 26.1–32.9)
MCHC RBC AUTO-ENTMCNC: 30.5 G/DL (ref 31.4–35)
MCV RBC AUTO: 100.9 FL (ref 82–102)
MONOCYTES # BLD: 0.5 K/UL (ref 0.1–1.3)
MONOCYTES NFR BLD: 12 % (ref 4–12)
NEUTS SEG # BLD: 2.6 K/UL (ref 1.7–8.2)
NEUTS SEG NFR BLD: 67 % (ref 43–78)
NRBC # BLD: 0 K/UL (ref 0–0.2)
PLATELET # BLD AUTO: 117 K/UL (ref 150–450)
PLATELET COMMENT: ADEQUATE
PMV BLD AUTO: 10.2 FL (ref 9.4–12.3)
POTASSIUM SERPL-SCNC: 4 MMOL/L (ref 3.5–5.1)
RBC # BLD AUTO: 3.25 M/UL (ref 4.05–5.2)
RBC MORPH BLD: ABNORMAL
RBC MORPH BLD: ABNORMAL
SERVICE CMNT-IMP: ABNORMAL
SERVICE CMNT-IMP: NORMAL
SODIUM SERPL-SCNC: 145 MMOL/L (ref 136–145)
WBC # BLD AUTO: 3.8 K/UL (ref 4.3–11.1)
WBC MORPH BLD: ABNORMAL

## 2024-11-25 PROCEDURE — 6370000000 HC RX 637 (ALT 250 FOR IP): Performed by: FAMILY MEDICINE

## 2024-11-25 PROCEDURE — 82962 GLUCOSE BLOOD TEST: CPT

## 2024-11-25 PROCEDURE — 6370000000 HC RX 637 (ALT 250 FOR IP): Performed by: INTERNAL MEDICINE

## 2024-11-25 PROCEDURE — 85025 COMPLETE CBC W/AUTO DIFF WBC: CPT

## 2024-11-25 PROCEDURE — 6360000002 HC RX W HCPCS: Performed by: INTERNAL MEDICINE

## 2024-11-25 PROCEDURE — 2580000003 HC RX 258: Performed by: INTERNAL MEDICINE

## 2024-11-25 PROCEDURE — 36415 COLL VENOUS BLD VENIPUNCTURE: CPT

## 2024-11-25 PROCEDURE — 80048 BASIC METABOLIC PNL TOTAL CA: CPT

## 2024-11-25 RX ORDER — LOSARTAN POTASSIUM 100 MG/1
100 TABLET ORAL DAILY
Qty: 30 TABLET | Refills: 11
Start: 2024-11-25

## 2024-11-25 RX ORDER — AMLODIPINE BESYLATE 10 MG/1
10 TABLET ORAL DAILY
Qty: 30 TABLET | Refills: 2 | Status: SHIPPED | OUTPATIENT
Start: 2024-11-25

## 2024-11-25 RX ADMIN — PANTOPRAZOLE SODIUM 40 MG: 40 TABLET, DELAYED RELEASE ORAL at 05:56

## 2024-11-25 RX ADMIN — HEPARIN SODIUM 5000 UNITS: 5000 INJECTION INTRAVENOUS; SUBCUTANEOUS at 05:56

## 2024-11-25 RX ADMIN — ACETAMINOPHEN 650 MG: 325 TABLET ORAL at 05:56

## 2024-11-25 RX ADMIN — PREDNISONE 5 MG: 5 TABLET ORAL at 09:20

## 2024-11-25 RX ADMIN — SODIUM CHLORIDE: 9 INJECTION, SOLUTION INTRAVENOUS at 06:01

## 2024-11-25 RX ADMIN — AMLODIPINE BESYLATE 5 MG: 5 TABLET ORAL at 09:20

## 2024-11-25 RX ADMIN — LEVOTHYROXINE SODIUM 75 MCG: 0.07 TABLET ORAL at 05:56

## 2024-11-25 ASSESSMENT — PAIN SCALES - GENERAL: PAINLEVEL_OUTOF10: 0

## 2024-11-25 NOTE — PLAN OF CARE
Problem: Chronic Conditions and Co-morbidities  Goal: Patient's chronic conditions and co-morbidity symptoms are monitored and maintained or improved  11/24/2024 2321 by Anthony Conde RN  Outcome: Not Progressing  11/24/2024 1026 by Bindu Guzman RN  Outcome: Progressing     Problem: Discharge Planning  Goal: Discharge to home or other facility with appropriate resources  11/24/2024 2321 by Anthony Conde RN  Outcome: Not Progressing  11/24/2024 1026 by Bindu Guzman RN  Outcome: Progressing     Problem: Metabolic/Fluid and Electrolytes - Adult  Goal: Electrolytes maintained within normal limits  11/24/2024 2321 by Anthony Conde RN  Outcome: Not Progressing  11/24/2024 1026 by Bindu Guzman RN  Outcome: Progressing  Goal: Hemodynamic stability and optimal renal function maintained  11/24/2024 2321 by Anthony Conde RN  Outcome: Not Progressing  11/24/2024 1026 by Bindu Guzman RN  Outcome: Progressing  Goal: Glucose maintained within prescribed range  11/24/2024 2321 by Anthony Conde RN  Outcome: Not Progressing  11/24/2024 1026 by Bindu Guzman RN  Outcome: Progressing     Problem: Hematologic - Adult  Goal: Maintains hematologic stability  11/24/2024 2321 by Anthony Conde RN  Outcome: Not Progressing  11/24/2024 1026 by Bindu Guzman RN  Outcome: Progressing

## 2024-11-25 NOTE — PROGRESS NOTES
Luci Carmona  Admission Date: 11/22/2024         [facility] Progress Note: 11/24/2024    Follow-up for: SHANKAR or CKD    The patient's chart is reviewed and the patient is discussed with the staff.    Subjective:     Patient seen and examined in room.  Lying down comfortably in bed.  She feels that she is improving    ROS:  Gen - no fever, no chills, appetite unchanged  CV - no chest pain, no palpitation  Lung - no shortness of breath, no cough  Abd - no tenderness, no nausea/vomiting, no diarrhea  Ext - no edema    Current Facility-Administered Medications   Medication Dose Route Frequency    amLODIPine (NORVASC) tablet 5 mg  5 mg Oral Daily    insulin lispro (HUMALOG,ADMELOG) injection vial 0-4 Units  0-4 Units SubCUTAneous 4x Daily AC & HS    acetaminophen (TYLENOL) tablet 650 mg  650 mg Oral 3 times per day    bismuth subsalicylate (PEPTO BISMOL) 262 MG/15ML suspension 15 mL  15 mL Oral Q6H PRN    [Held by provider] hydroCHLOROthiazide capsule 12.5 mg  12.5 mg Oral QAM    latanoprost (XALATAN) 0.005 % ophthalmic solution 1 drop  1 drop Both Eyes Nightly    levothyroxine (SYNTHROID) tablet 75 mcg  75 mcg Oral Daily    [Held by provider] losartan (COZAAR) tablet 100 mg  100 mg Oral Daily    [Held by provider] metFORMIN (GLUCOPHAGE) tablet 500 mg  500 mg Oral Daily with breakfast    pantoprazole (PROTONIX) tablet 40 mg  40 mg Oral QAM AC    predniSONE (DELTASONE) tablet 5 mg  5 mg Oral Daily    cefTRIAXone (ROCEPHIN) 1,000 mg in sterile water 10 mL IV syringe  1,000 mg IntraVENous Q24H    0.9 % sodium chloride infusion   IntraVENous Continuous    sodium chloride flush 0.9 % injection 5-40 mL  5-40 mL IntraVENous 2 times per day    sodium chloride flush 0.9 % injection 5-40 mL  5-40 mL IntraVENous PRN    0.9 % sodium chloride infusion   IntraVENous PRN    ondansetron (ZOFRAN-ODT) disintegrating tablet 4 mg  4 mg Oral Q8H PRN    Or    ondansetron (ZOFRAN) injection 4 mg  4 mg 
       Hospitalist Progress Note   Admit Date:  2024  4:46 PM   Name:  Luci Carmona   Age:  79 y.o.  Sex:  female  :  1945   MRN:  [id number]   Room:      Presenting/Chief Complaint: Labs Only     Reason(s) for Admission: Dehydration [E86.0]  MGUS (monoclonal gammopathy of unknown significance) [D47.2]  Acute kidney injury (HCC) [N17.9]  Urinary tract infection without hematuria, site unspecified [N39.0]  Acute kidney injury superimposed on stage 3a chronic kidney disease (HCC) [N17.9, N18.31]     Hospital Day #1      Hospital Course:   Luci Carmona is a 79 y.o. 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 patient was recently seen by PCP and started on metformin which she has not taken any medicine for her diabetes recently and metformin was restarted 500 mg daily which she started taking recently.Patient with Dr. Noriega hematologist spoke with Dr. Cyn pardo in the ED and sent the patient over because of renal dysfunction.  Hospitalist service called to admit this patient.    Subjective & 24hr Events:   Patient reclining comfortably in 
4 Eyes Skin Assessment     NAME:  Luci Carmona  YOB: 1945  MEDICAL RECORD NUMBER:  [id number]    The patient is being assessed for  Admission    I agree that at least one RN has performed a thorough Head to Toe Skin Assessment on the patient. ALL assessment sites listed below have been assessed.      Areas assessed by both nurses:    Head, Face, Ears, Shoulders, Back, Chest, Arms, Elbows, Hands, Sacrum. Buttock, Coccyx, Ischium, Legs. Feet and Heels, and Under Medical Devices         Does the Patient have a Wound? No noted wound(s)       Srinivasa Prevention initiated by RN: Yes  Wound Care Orders initiated by RN: No    Pressure Injury (Stage 3,4, Unstageable, DTI, NWPT, and Complex wounds) if present, place Wound referral order by RN under : No    New Ostomies, if present place, Ostomy referral order under : No     Nurse 1 eSignature: Electronically signed by Anthony Cnode RN on 11/22/24 at 9:47 PM EST    **SHARE this note so that the co-signing nurse can place an eSignature**    Nurse 2 eSignature: Electronically signed by Lenore Juares RN on 11/23/24 at 12:07 AM EST    
END OF SHIFT NOTE:    INTAKE/OUTPUT  11/22 0701 - 11/23 0700  In: 500   Out: 150 [Urine:150]  Voiding: Yes  Catheter: No  Drain:   External Urinary Catheter (Active)   Output (mL) 150 mL 11/22/24 2307       External Urinary Catheter (Active)               Flatus: Patient does have flatus present.    Stool: 1 occurrences.    Characteristics:  Stool Appearance: Other (Comment) (\"normal\" per patient, unwitnessed)        Stool Assessment  Stool Appearance: Other (Comment) (\"normal\" per patient, unwitnessed)  Last BM (including prior to admit): 11/23/24    Emesis: 0 occurrences.    Characteristics:        VITAL SIGNS  Patient Vitals for the past 12 hrs:   Temp Pulse Resp BP SpO2   11/23/24 1508 98.2 °F (36.8 °C) 62 14 135/66 98 %   11/23/24 1129 98.7 °F (37.1 °C) 63 14 (!) 148/71 97 %   11/23/24 0703 98.2 °F (36.8 °C) 60 14 (!) 164/75 98 %       Pain Assessment  Pain Level: 0 (11/23/24 0630)          Ambulating  Yes    Shift report given to oncoming nurse at the bedside.    Sabine Munguia RN     
END OF SHIFT NOTE:    INTAKE/OUTPUT  11/23 0701 - 11/24 0700  In: 3266.4 [P.O.:720; I.V.:2546.4]  Out: 550 [Urine:550]  Voiding: Yes  Catheter: No  Drain:   External Urinary Catheter (Active)   Site Assessment Clean,dry & intact 11/23/24 1930   Catheter Care Catheter/Wick replaced;Suction Canister/Tubing changed 11/23/24 1930   Perineal Care Yes 11/23/24 1930   Suction 40 mmgHg continuous 11/23/24 1930   Urine Color Yellow 11/23/24 1930   Output (mL) 250 mL 11/24/24 0329       External Urinary Catheter (Active)               Flatus: Patient does have flatus present.    Stool:  occurrences.    Characteristics:  Stool Appearance: Other (Comment) (\"normal\" per patient, unwitnessed)        Stool Assessment  Stool Appearance: Other (Comment) (\"normal\" per patient, unwitnessed)  Last BM (including prior to admit): 11/23/24    Emesis:  occurrences.    Characteristics:        VITAL SIGNS  Patient Vitals for the past 12 hrs:   Temp Pulse Resp BP SpO2   11/24/24 0329 98.2 °F (36.8 °C) 65 16 (!) 145/59 96 %   11/23/24 2309 97.7 °F (36.5 °C) 62 16 (!) 146/65 97 %   11/23/24 1952 98.4 °F (36.9 °C) 75 16 128/67 97 %       Pain Assessment  Pain Level: 0 (11/23/24 0630)          Ambulating  Yes    Shift report given to oncoming nurse at the bedside.    Anthony Conde RN      
END OF SHIFT NOTE:    INTAKE/OUTPUT  11/24 0701 - 11/25 0700  In: 2034.3 [P.O.:240; I.V.:1794.3]  Out: 1000 [Urine:1000]  Voiding: Yes  Catheter: No  Drain:   External Urinary Catheter (Active)   Site Assessment Clean,dry & intact 11/24/24 2252   Catheter Care Catheter/Wick replaced;Suction Canister/Tubing changed 11/24/24 2252   Perineal Care Yes 11/23/24 1930   Suction 40 mmgHg continuous 11/23/24 1930   Urine Color Yellow 11/24/24 2252   Output (mL) 200 mL 11/25/24 0315       External Urinary Catheter (Active)   Site Assessment Clean,dry & intact 11/24/24 1941   Placement Replaced 11/25/24 0220   Suction 40 mmgHg continuous 11/24/24 1941   Urine Color Yellow 11/24/24 1941   Urine Appearance Clear 11/24/24 1941               Flatus: Patient does have flatus present.    Stool: 0 occurrences.    Characteristics:  Stool Appearance: Other (Comment) (\"normal\" per patient, unwitnessed)        Stool Assessment  Stool Appearance: Other (Comment) (\"normal\" per patient, unwitnessed)  Last BM (including prior to admit): 11/23/24    Emesis:  occurrences.    Characteristics:        VITAL SIGNS  Patient Vitals for the past 12 hrs:   Temp Pulse Resp BP SpO2   11/25/24 0315 97.9 °F (36.6 °C) 65 18 (!) 154/61 99 %   11/24/24 2252 98.2 °F (36.8 °C) 70 18 (!) 153/64 98 %   11/24/24 1941 98.4 °F (36.9 °C) 75 17 (!) 148/74 98 %       Pain Assessment  Pain Level: 0 (11/23/24 0630)          Ambulating  Yes    Shift report given to oncoming nurse at the bedside.    Anthony Conde RN      
Spiritual Health History and Assessment/Progress Note  [facility]    (P) Initial Encounter,  ,  ,      Name: Luci Carmona MRN: [id number]    Age: 79 y.o.     Sex: female   Language: English   Restoration: Gnosticist   SHANKAR (acute kidney injury) (HCC)     Date: 11/25/2024            Total Time Calculated: (P) 15 min              Spiritual Assessment began in [facility]        Referral/Consult From: (P) Rounding   Encounter Overview/Reason: (P) Initial Encounter  Service Provided For: (P) Patient    Yanet, Belief, Meaning:   Patient identifies as spiritual, is connected with a yanet tradition or spiritual practice, and has beliefs or practices that help with coping during difficult times  Family/Friends No family/friends present      Importance and Influence:  Patient has no beliefs influential to healthcare decision-making identified during this visit  Family/Friends No family/friends present    Community:  Patient is connected with a spiritual community  Family/Friends No family/friends present    Assessment and Plan of Care:     Patient Interventions include: Facilitated expression of thoughts and feelings, Engaged in theological reflection, Affirmed coping skills/support systems, and Provided sacramental/Taoism ritual  Family/Friends Interventions include: No family/friends present    Patient Plan of Care: Spiritual Care available upon further referral  Family/Friends Plan of Care: No family/friends present    Electronically signed by BULMARO MADISON on 11/25/2024 at 10:48 AM    
TRANSFER - IN REPORT:    Verbal [facility] for routine progression of patient care      Report consisted of patient's Situation, Background, Assessment and   Recommendations(SBAR).     Information from the following report(s) ED Encounter Summary, ED SBAR, MAR, and Recent Results was reviewed with the receiving nurse.    Opportunity for questions and clarification was provided.      Assessment completed upon patient's arrival to unit and care assumed.     
9.8 8.8 - 10.2 MG/DL    Pth Intact 72.0 (H) 15.0 - 65.0 pg/mL   POCT Glucose    Collection Time: 11/23/24  7:05 AM   Result Value Ref Range    POC Glucose 86 65 - 100 mg/dL    Performed by: HayatiElliePCT    POCT Glucose    Collection Time: 11/23/24 11:26 AM   Result Value Ref Range    POC Glucose 105 (H) 65 - 100 mg/dL    Performed by: HayatiElliePCT    POCT Glucose    Collection Time: 11/23/24  4:02 PM   Result Value Ref Range    POC Glucose 124 (H) 65 - 100 mg/dL    Performed by: HayatiElliePCT    POCT Glucose    Collection Time: 11/23/24  8:39 PM   Result Value Ref Range    POC Glucose 115 (H) 65 - 100 mg/dL    Performed by: Clementina    Basic Metabolic Panel w/ Reflex to MG    Collection Time: 11/24/24  3:53 AM   Result Value Ref Range    Sodium 143 136 - 145 mmol/L    Potassium 4.3 3.5 - 5.1 mmol/L    Chloride 112 (H) 98 - 107 mmol/L    CO2 20 20 - 29 mmol/L    Anion Gap 11 7 - 16 mmol/L    Glucose 95 70 - 99 mg/dL    BUN 31 (H) 8 - 23 MG/DL    Creatinine 2.06 (H) 0.60 - 1.10 MG/DL    Est, Glom Filt Rate 24 (L) >60 ml/min/1.73m2    Calcium 9.1 8.8 - 10.2 MG/DL   CBC with Auto Differential    Collection Time: 11/24/24  3:53 AM   Result Value Ref Range    WBC 4.8 4.3 - 11.1 K/uL    RBC 3.62 (L) 4.05 - 5.2 M/uL    Hemoglobin 11.0 (L) 11.7 - 15.4 g/dL    Hematocrit 36.0 35.8 - 46.3 %    MCV 99.4 82 - 102 FL    MCH 30.4 26.1 - 32.9 PG    MCHC 30.6 (L) 31.4 - 35.0 g/dL    RDW 16.2 (H) 11.9 - 14.6 %    Platelets 148 (L) 150 - 450 K/uL    MPV 10.5 9.4 - 12.3 FL    nRBC 0.00 0.0 - 0.2 K/uL    Differential Type AUTOMATED      Neutrophils % 70 43 - 78 %    Lymphocytes % 14 13 - 44 %    Monocytes % 9 4.0 - 12.0 %    Eosinophils % 1 0.5 - 7.8 %    Basophils % 1 0.0 - 2.0 %    Immature Granulocytes % 5 0.0 - 5.0 %    Neutrophils Absolute 3.3 1.7 - 8.2 K/UL    Lymphocytes Absolute 0.7 0.5 - 4.6 K/UL    Monocytes Absolute 0.4 0.1 - 1.3 K/UL    Eosinophils Absolute 0.1 0.0 - 0.8 K/UL    Basophils Absolute 0.0 0.0 -

## 2024-11-25 NOTE — DISCHARGE SUMMARY
coloration  Neuro:  AAOx3.  CN II-XII grossly intact.  Psych:  Normal mood and affect.      Signed:  Antonio Marvin M.D.  Hospitalist  11/25/24  10:10 AM

## 2024-11-25 NOTE — DISCHARGE INSTRUCTIONS
DISCHARGE SUMMARY from Nurse    PATIENT INSTRUCTIONS:    After general anesthesia or intravenous sedation, for 24 hours or while taking prescription Narcotics:  Limit your activities  Do not drive and operate hazardous machinery  Do not make important personal or business decisions  Do  not drink alcoholic beverages  If you have not urinated within 8 hours after discharge, please contact your surgeon on call.    Report the following to your surgeon:  Excessive pain, swelling, redness or odor of or around the surgical area  Temperature over 100.5  Nausea and vomiting lasting longer than 4 hours or if unable to take medications  Any signs of decreased circulation or nerve impairment to extremity: change in color, persistent  numbness, tingling, coldness or increase pain  Any questions    What to do at Home:  Recommended activity: activity as tolerated,     If you experience any of the following symptoms new pain, fever, confusion, change in urination color, consistency, odor, please follow up with PCP.    *  Please give a list of your current medications to your Primary Care Provider.    *  Please update this list whenever your medications are discontinued, doses are      changed, or new medications (including over-the-counter products) are added.    *  Please carry medication information at all times in case of emergency situations.    These are general instructions for a healthy lifestyle:    No smoking/ No tobacco products/ Avoid exposure to second hand smoke  Surgeon General's Warning:  Quitting smoking now greatly reduces serious risk to your health.    Obesity, smoking, and sedentary lifestyle greatly increases your risk for illness    A healthy diet, regular physical exercise & weight monitoring are important for maintaining a healthy lifestyle    You may be retaining fluid if you have a history of heart failure or if you experience any of the following symptoms:  Weight gain of 3 pounds or more overnight or

## 2024-11-26 ENCOUNTER — CARE COORDINATION (OUTPATIENT)
Dept: CARE COORDINATION | Facility: CLINIC | Age: 79
End: 2024-11-26

## 2024-11-26 NOTE — CARE COORDINATION
Care Transitions Note    Initial Call - Call within 2 business days of discharge: Yes    Attempted to reach patient for transitions of care follow up. Unable to reach patient.    Outreach Attempts:   Unable to leave message. Voicemail states number not in service and temporarily not available    Patient: Luci Carmona    Patient : 1945   MRN: [id number]    Reason for Admission: SHANKAR  Discharge Date: 24  RURS: Readmission Risk Score: 15.4    Last Discharge Facility       Date Complaint Diagnosis Description Type Department Provider    24 Labs Only Acute kidney injury (HCC) ... ED to Hosp-Admission (Discharged) (ADMITTED) [id number] Marvin, Franck Merino MD; Jer...            Was this an external facility discharge? No    Follow Up Appointment:   Patient has hospital follow up appointment scheduled greater than 14 days after discharge; [facility]-2024 at 2:15 pm.    Future Appointments         Provider Specialty Dept Phone    2025 9:15 AM [facility] [phone]    2025 11:30 AM Amy Negro MD Family Medicine [phone]    2025 12:00 PM PERIPHERAL Lab [phone]    2025 10:30 AM Amy Negro MD Family Medicine [phone]    2025 1:00 PM Zain Liz MD Oncology [phone]            Plan for follow-up on next business day.      Briana Hunter RN

## 2024-11-27 ENCOUNTER — CARE COORDINATION (OUTPATIENT)
Dept: CARE COORDINATION | Facility: CLINIC | Age: 79
End: 2024-11-27

## 2024-11-27 LAB — IMMUNOLOGIST REVIEW: NORMAL

## 2024-11-27 NOTE — CARE COORDINATION
Care Transitions Note    Initial Call - Call within 2 business days of discharge: Yes    Attempted to reach patient for transitions of care follow up. Unable to reach patient.    Outreach Attempts:   Multiple attempts to contact patient at phone numbers on file.   Mychart message sent.   Unable to leave message. Voicemail states number not in service and temporarily not available     Patient: Luci Carmona    Patient : 1945   MRN: [id number]    Reason for Admission: SHANKAR  Discharge Date: 24  RURS: Readmission Risk Score: 15.4    Last Discharge Facility       Date Complaint Diagnosis Description Type Department Provider    24 Labs Only Acute kidney injury (HCC) ... ED to Hosp-Admission (Discharged) (ADMITTED) [id number] Marvin, Franck Merino MD; Jer...            Was this an external facility discharge? No    Follow Up Appointment:   Patient has hospital follow up appointment scheduled within 14 days of discharge.  [facility]-2024 at 2:15 pm.   Future Appointments         Provider Specialty Dept Phone    2025 9:15 AM [facility] Family Medicine [phone]    2025 11:30 AM Amy Negro MD Family Medicine [phone]    2025 12:00 PM PERIPHERAL Lab [phone]    2025 10:30 AM Amy Negro MD Family Medicine [phone]    2025 1:00 PM Zain Liz MD Oncology [phone]            No further follow-up call indicated     Briana Hunter RN

## 2025-02-19 ENCOUNTER — OFFICE VISIT (OUTPATIENT)
Dept: FAMILY MEDICINE CLINIC | Facility: CLINIC | Age: 80
End: 2025-02-19

## 2025-02-19 VITALS
TEMPERATURE: 97 F | DIASTOLIC BLOOD PRESSURE: 64 MMHG | OXYGEN SATURATION: 99 % | HEIGHT: 62 IN | SYSTOLIC BLOOD PRESSURE: 137 MMHG | BODY MASS INDEX: 24.18 KG/M2 | HEART RATE: 76 BPM | WEIGHT: 131.4 LBS

## 2025-02-19 DIAGNOSIS — M05.9 RHEUMATOID ARTHRITIS WITH POSITIVE RHEUMATOID FACTOR, INVOLVING UNSPECIFIED SITE (HCC): ICD-10-CM

## 2025-02-19 DIAGNOSIS — E11.9 TYPE 2 DIABETES MELLITUS WITHOUT COMPLICATION, WITHOUT LONG-TERM CURRENT USE OF INSULIN (HCC): Primary | ICD-10-CM

## 2025-02-19 DIAGNOSIS — I10 ESSENTIAL HYPERTENSION: Chronic | ICD-10-CM

## 2025-02-19 DIAGNOSIS — K21.00 GASTROESOPHAGEAL REFLUX DISEASE WITH ESOPHAGITIS WITHOUT HEMORRHAGE: Chronic | ICD-10-CM

## 2025-02-19 DIAGNOSIS — E03.9 ACQUIRED HYPOTHYROIDISM: Chronic | ICD-10-CM

## 2025-02-19 DIAGNOSIS — E78.00 HYPERCHOLESTEROLEMIA: ICD-10-CM

## 2025-02-19 DIAGNOSIS — N18.32 STAGE 3B CHRONIC KIDNEY DISEASE (HCC): ICD-10-CM

## 2025-02-19 DIAGNOSIS — H91.93 BILATERAL HEARING LOSS, UNSPECIFIED HEARING LOSS TYPE: ICD-10-CM

## 2025-02-19 PROBLEM — N17.9 AKI (ACUTE KIDNEY INJURY): Status: RESOLVED | Noted: 2024-11-22 | Resolved: 2025-02-19

## 2025-02-19 PROBLEM — N18.31 CHRONIC RENAL FAILURE, STAGE 3A (HCC): Status: RESOLVED | Noted: 2022-05-13 | Resolved: 2025-02-19

## 2025-02-19 PROBLEM — E11.65 TYPE 2 DIABETES MELLITUS WITH HYPERGLYCEMIA, WITHOUT LONG-TERM CURRENT USE OF INSULIN (HCC): Chronic | Status: RESOLVED | Noted: 2024-11-19 | Resolved: 2025-02-19

## 2025-02-19 RX ORDER — LEVOTHYROXINE SODIUM 75 UG/1
TABLET ORAL
Qty: 90 TABLET | Refills: 1 | Status: SHIPPED | OUTPATIENT
Start: 2025-02-19

## 2025-02-19 RX ORDER — LOSARTAN POTASSIUM 50 MG/1
50 TABLET ORAL DAILY
Qty: 90 TABLET | Refills: 3 | Status: SHIPPED | OUTPATIENT
Start: 2025-02-19

## 2025-02-19 RX ORDER — AMLODIPINE BESYLATE 10 MG/1
10 TABLET ORAL DAILY
Qty: 30 TABLET | Refills: 2 | Status: SHIPPED | OUTPATIENT
Start: 2025-02-19

## 2025-02-19 RX ORDER — LEVOTHYROXINE SODIUM 50 UG/1
TABLET ORAL
Qty: 90 TABLET | Refills: 1 | Status: SHIPPED | OUTPATIENT
Start: 2025-02-19

## 2025-02-19 ASSESSMENT — PATIENT HEALTH QUESTIONNAIRE - PHQ9
SUM OF ALL RESPONSES TO PHQ QUESTIONS 1-9: 0
SUM OF ALL RESPONSES TO PHQ QUESTIONS 1-9: 0
2. FEELING DOWN, DEPRESSED OR HOPELESS: NOT AT ALL
1. LITTLE INTEREST OR PLEASURE IN DOING THINGS: NOT AT ALL
SUM OF ALL RESPONSES TO PHQ QUESTIONS 1-9: 0
SUM OF ALL RESPONSES TO PHQ9 QUESTIONS 1 & 2: 0
SUM OF ALL RESPONSES TO PHQ QUESTIONS 1-9: 0

## 2025-02-19 NOTE — PROGRESS NOTES
130s over 60s. The current dosage of losartan potassium 100 mg may be contributing to slightly low blood pressure levels. The dosage of losartan potassium will be reduced from 100 mg to 50 mg. A prescription refill for amlodipine 10 mg and losartan potassium 50 mg will be provided.    2. Hypothyroidism.  Recent thyroid function tests indicate that she may be receiving an excessive amount of thyroid hormone. The dosage of her thyroid medication will be adjusted to alternate between 75 mcg and 50 mcg every other day. Thyroid function tests will be repeated in 3 months to assess the effectiveness of the new dosage regimen.    3. Diabetes Mellitus.  Her hemoglobin A1c levels have improved significantly, decreasing from 7.8 to 6.0. She reports occasional dizziness, which may be a side effect of her current medication regimen. There is no proteinuria, and her kidney function has improved, with a filtration rate increasing from 29 to 48. She is advised to limit her intake of bananas to one per day. She is also advised to inform healthcare providers about her metformin use prior to undergoing any CT scans with IV contrast. She is encouraged to monitor her feet daily for any non-healing cuts or sores. A referral to a podiatrist will be made for nail trimming. A prescription refill for metformin will be provided. If her blood glucose levels remain stable or decrease further, discontinuation of metformin may be considered.    4. Gastroesophageal Reflux Disease (GERD).  She reports occasional stomach discomfort and a history of ulcers. She has had adverse reactions to AcipHex and Pepcid. She will continue her current regimen of Pepto-Bismol.    5. Health Maintenance.  She has a mammogram scheduled for 03/04/2025.    Follow-up  The patient is scheduled for a follow-up visit in 6 months.      Patient counseled on diet and exercise.    Amy Negro MD    The patient (or guardian, if applicable) and other individuals in

## 2025-03-04 ENCOUNTER — HOSPITAL ENCOUNTER (OUTPATIENT)
Dept: MAMMOGRAPHY | Age: 80
Discharge: HOME | End: 2025-03-07
Attending: FAMILY MEDICINE
Payer: MEDICARE

## 2025-03-04 DIAGNOSIS — Z12.31 ENCOUNTER FOR SCREENING MAMMOGRAM FOR HIGH-RISK PATIENT: ICD-10-CM

## 2025-03-04 PROCEDURE — 77063 BREAST TOMOSYNTHESIS BI: CPT

## 2025-03-08 ENCOUNTER — RESULTS FOLLOW-UP (OUTPATIENT)
Dept: MAMMOGRAPHY | Age: 80
End: 2025-03-08

## 2025-03-19 ENCOUNTER — OFFICE VISIT (OUTPATIENT)
Dept: FAMILY MEDICINE CLINIC | Facility: CLINIC | Age: 80
End: 2025-03-19
Payer: MEDICARE

## 2025-03-19 VITALS
RESPIRATION RATE: 18 BRPM | HEIGHT: 62 IN | HEART RATE: 83 BPM | DIASTOLIC BLOOD PRESSURE: 51 MMHG | BODY MASS INDEX: 23.55 KG/M2 | WEIGHT: 128 LBS | SYSTOLIC BLOOD PRESSURE: 122 MMHG | TEMPERATURE: 98.1 F | OXYGEN SATURATION: 99 %

## 2025-03-19 DIAGNOSIS — E11.9 TYPE 2 DIABETES MELLITUS WITHOUT COMPLICATION, WITHOUT LONG-TERM CURRENT USE OF INSULIN: Primary | ICD-10-CM

## 2025-03-19 DIAGNOSIS — I10 ESSENTIAL HYPERTENSION: Chronic | ICD-10-CM

## 2025-03-19 PROCEDURE — 3044F HG A1C LEVEL LT 7.0%: CPT | Performed by: FAMILY MEDICINE

## 2025-03-19 PROCEDURE — 1159F MED LIST DOCD IN RCRD: CPT | Performed by: FAMILY MEDICINE

## 2025-03-19 PROCEDURE — 3074F SYST BP LT 130 MM HG: CPT | Performed by: FAMILY MEDICINE

## 2025-03-19 PROCEDURE — G8399 PT W/DXA RESULTS DOCUMENT: HCPCS | Performed by: FAMILY MEDICINE

## 2025-03-19 PROCEDURE — 1036F TOBACCO NON-USER: CPT | Performed by: FAMILY MEDICINE

## 2025-03-19 PROCEDURE — G8420 CALC BMI NORM PARAMETERS: HCPCS | Performed by: FAMILY MEDICINE

## 2025-03-19 PROCEDURE — 1123F ACP DISCUSS/DSCN MKR DOCD: CPT | Performed by: FAMILY MEDICINE

## 2025-03-19 PROCEDURE — 1126F AMNT PAIN NOTED NONE PRSNT: CPT | Performed by: FAMILY MEDICINE

## 2025-03-19 PROCEDURE — 3078F DIAST BP <80 MM HG: CPT | Performed by: FAMILY MEDICINE

## 2025-03-19 PROCEDURE — 99214 OFFICE O/P EST MOD 30 MIN: CPT | Performed by: FAMILY MEDICINE

## 2025-03-19 PROCEDURE — G2211 COMPLEX E/M VISIT ADD ON: HCPCS | Performed by: FAMILY MEDICINE

## 2025-03-19 PROCEDURE — 1090F PRES/ABSN URINE INCON ASSESS: CPT | Performed by: FAMILY MEDICINE

## 2025-03-19 PROCEDURE — G8427 DOCREV CUR MEDS BY ELIG CLIN: HCPCS | Performed by: FAMILY MEDICINE

## 2025-03-19 RX ORDER — AMLODIPINE BESYLATE 10 MG/1
10 TABLET ORAL DAILY
Qty: 90 TABLET | Refills: 3 | Status: SHIPPED | OUTPATIENT
Start: 2025-03-19

## 2025-03-19 ASSESSMENT — PATIENT HEALTH QUESTIONNAIRE - PHQ9
SUM OF ALL RESPONSES TO PHQ QUESTIONS 1-9: 0
SUM OF ALL RESPONSES TO PHQ QUESTIONS 1-9: 0
1. LITTLE INTEREST OR PLEASURE IN DOING THINGS: NOT AT ALL
2. FEELING DOWN, DEPRESSED OR HOPELESS: NOT AT ALL
SUM OF ALL RESPONSES TO PHQ QUESTIONS 1-9: 0
SUM OF ALL RESPONSES TO PHQ QUESTIONS 1-9: 0

## 2025-03-19 NOTE — PROGRESS NOTES
blood glucose levels are within the normal range, but she has been experiencing diarrhea, likely due to metformin. She is advised to discontinue metformin and manage her diabetes through dietary modifications. A referral to a nutritionist will be made to assist with dietary management. Arrangements will be made for her to attend diabetic classes at [facility].    2. Hypertension.  Her blood pressure is well-controlled with her current medications. Refills for her antihypertensive medications will be provided and sent to [facility].    Follow-up  The patient will follow up in 08/2025.      Amy Negro MD

## 2025-04-29 ENCOUNTER — OFFICE VISIT (OUTPATIENT)
Dept: FAMILY MEDICINE CLINIC | Facility: CLINIC | Age: 80
End: 2025-04-29
Payer: MEDICARE

## 2025-04-29 ENCOUNTER — TELEPHONE (OUTPATIENT)
Dept: FAMILY MEDICINE CLINIC | Facility: CLINIC | Age: 80
End: 2025-04-29

## 2025-04-29 VITALS
DIASTOLIC BLOOD PRESSURE: 53 MMHG | SYSTOLIC BLOOD PRESSURE: 144 MMHG | WEIGHT: 134.4 LBS | RESPIRATION RATE: 17 BRPM | BODY MASS INDEX: 24.73 KG/M2 | HEART RATE: 82 BPM | TEMPERATURE: 98.1 F | OXYGEN SATURATION: 99 % | HEIGHT: 62 IN

## 2025-04-29 DIAGNOSIS — I10 ESSENTIAL HYPERTENSION: ICD-10-CM

## 2025-04-29 DIAGNOSIS — D64.9 ANEMIA, UNSPECIFIED TYPE: Primary | ICD-10-CM

## 2025-04-29 LAB
BASOPHILS # BLD: 0.02 K/UL (ref 0–0.2)
BASOPHILS NFR BLD: 0.3 % (ref 0–2)
DIFFERENTIAL METHOD BLD: ABNORMAL
EOSINOPHIL # BLD: 0.02 K/UL (ref 0–0.8)
EOSINOPHIL NFR BLD: 0.3 % (ref 0.5–7.8)
ERYTHROCYTE [DISTWIDTH] IN BLOOD BY AUTOMATED COUNT: 18.6 % (ref 11.9–14.6)
FERRITIN SERPL-MCNC: 40 NG/ML (ref 8–388)
HCT VFR BLD AUTO: 28.6 % (ref 35.8–46.3)
HGB BLD-MCNC: 8.4 G/DL (ref 11.7–15.4)
IMM GRANULOCYTES # BLD AUTO: 0.07 K/UL (ref 0–0.5)
IMM GRANULOCYTES NFR BLD AUTO: 1.1 % (ref 0–5)
IRON SERPL-MCNC: 29 UG/DL (ref 35–100)
LYMPHOCYTES # BLD: 0.52 K/UL (ref 0.5–4.6)
LYMPHOCYTES NFR BLD: 8.2 % (ref 13–44)
MCH RBC QN AUTO: 24.6 PG (ref 26.1–32.9)
MCHC RBC AUTO-ENTMCNC: 29.4 G/DL (ref 31.4–35)
MCV RBC AUTO: 83.6 FL (ref 82–102)
MONOCYTES # BLD: 0.35 K/UL (ref 0.1–1.3)
MONOCYTES NFR BLD: 5.5 % (ref 4–12)
NEUTS SEG # BLD: 5.38 K/UL (ref 1.7–8.2)
NEUTS SEG NFR BLD: 84.6 % (ref 43–78)
NRBC # BLD: 0 K/UL (ref 0–0.2)
PLATELET # BLD AUTO: 169 K/UL (ref 150–450)
PMV BLD AUTO: 11 FL (ref 9.4–12.3)
RBC # BLD AUTO: 3.42 M/UL (ref 4.05–5.2)
WBC # BLD AUTO: 6.4 K/UL (ref 4.3–11.1)

## 2025-04-29 PROCEDURE — 1160F RVW MEDS BY RX/DR IN RCRD: CPT | Performed by: PHYSICIAN ASSISTANT

## 2025-04-29 PROCEDURE — G8399 PT W/DXA RESULTS DOCUMENT: HCPCS | Performed by: PHYSICIAN ASSISTANT

## 2025-04-29 PROCEDURE — 99214 OFFICE O/P EST MOD 30 MIN: CPT | Performed by: PHYSICIAN ASSISTANT

## 2025-04-29 PROCEDURE — 1123F ACP DISCUSS/DSCN MKR DOCD: CPT | Performed by: PHYSICIAN ASSISTANT

## 2025-04-29 PROCEDURE — 1036F TOBACCO NON-USER: CPT | Performed by: PHYSICIAN ASSISTANT

## 2025-04-29 PROCEDURE — 1159F MED LIST DOCD IN RCRD: CPT | Performed by: PHYSICIAN ASSISTANT

## 2025-04-29 PROCEDURE — 1090F PRES/ABSN URINE INCON ASSESS: CPT | Performed by: PHYSICIAN ASSISTANT

## 2025-04-29 PROCEDURE — 3078F DIAST BP <80 MM HG: CPT | Performed by: PHYSICIAN ASSISTANT

## 2025-04-29 PROCEDURE — 3077F SYST BP >= 140 MM HG: CPT | Performed by: PHYSICIAN ASSISTANT

## 2025-04-29 PROCEDURE — G8427 DOCREV CUR MEDS BY ELIG CLIN: HCPCS | Performed by: PHYSICIAN ASSISTANT

## 2025-04-29 PROCEDURE — G8420 CALC BMI NORM PARAMETERS: HCPCS | Performed by: PHYSICIAN ASSISTANT

## 2025-04-29 ASSESSMENT — ENCOUNTER SYMPTOMS
DIARRHEA: 0
SHORTNESS OF BREATH: 0
CONSTIPATION: 0
ANAL BLEEDING: 0
VOMITING: 0
NAUSEA: 0
ABDOMINAL PAIN: 0
BLOOD IN STOOL: 0

## 2025-04-29 ASSESSMENT — PATIENT HEALTH QUESTIONNAIRE - PHQ9
SUM OF ALL RESPONSES TO PHQ QUESTIONS 1-9: 0
1. LITTLE INTEREST OR PLEASURE IN DOING THINGS: NOT AT ALL
2. FEELING DOWN, DEPRESSED OR HOPELESS: NOT AT ALL
SUM OF ALL RESPONSES TO PHQ QUESTIONS 1-9: 0

## 2025-04-29 NOTE — PROGRESS NOTES
Atorvastatin Other (See Comments)     Other reaction(s): Myalgia-Intolerance    Famotidine Other (See Comments)     Not being able to talk  Other reaction(s): Other- (not listed) - Allergy  Not being able to talk    Hydrocodone-Acetaminophen Other (See Comments)     affected speech    Infliximab Other (See Comments)     messed up immune system    Methotrexate Other (See Comments)     messed up immune system  Other reaction(s): Other- (not listed) - Allergy  messed up immune system    Metronidazole Other (See Comments)     Jittery; loss of voice  Other reaction(s): Other- (not listed) - Allergy  Jittery; loss of voice    Omeprazole Other (See Comments)     effects speech  Other reaction(s): Other- (not listed) - Allergy  effects speech    Sulindac Hives     Other reaction(s): Rash-Allergy       Surgical History:  Past Surgical History:   Procedure Laterality Date    BREAST BIOPSY Right 10/30/2017    benign    CATARACT REMOVAL Left     COLONOSCOPY  03/15/2017    Dr [facility]    COLONOSCOPY N/A 7/28/2022    COLONOSCOPY/ POLYPECTOMY performed by Arlet Gonzalez MD at [facility] ENDOSCOPY    ESOPHGL BALO DISTENSION DX STD W/PROVOCATION      11/12/18    HEENT      throat stretched Dr. Vargas    HEDEJA      thyroid surgery Saccruss    HYSTERECTOMY (CERVIX STATUS UNKNOWN)  1980    secondary to bleeding; fibroids    MAHAMED STEREO BREAST BX W LOC DEVICE 1ST LESION RIGHT Right 10/30/2017    MAHAMED STEROTACTIC LOC BREAST BIOPSY RIGHT 10/30/2017 [facility] RADIOLOGY MAMMO    THYROIDECTOMY  06/2005    Dr. Buchanan- partial    UPPER GASTROINTESTINAL ENDOSCOPY N/A 7/28/2022    EGD ESOPHAGOGASTRODUODENOSCOPY/ DILATATION performed by Arlet Gonzalez MD at [facility] ENDOSCOPY       Family History:  Family History   Problem Relation Age of Onset    Kidney Disease Mother     Heart Attack Father     Heart Disease Father     Breast Cancer Maternal Aunt 80       Social History:   Social History     Social History Narrative    Not on file      Social History

## 2025-04-29 NOTE — TELEPHONE ENCOUNTER
Patient stopped by the office this p.m. We placed her on schedule to see Efren. Her hemoglobin at dr. Vega's office last week was 8.4. Results received and shared with RANDY.

## 2025-04-29 NOTE — TELEPHONE ENCOUNTER
Pt called stating her hemoglobin is real low and m,ay need infusion. Pt states her provider from [facility] is supposed to send Dr. Negro a message about this. Please give pt a call back.

## 2025-04-30 ENCOUNTER — RESULTS FOLLOW-UP (OUTPATIENT)
Dept: FAMILY MEDICINE CLINIC | Facility: CLINIC | Age: 80
End: 2025-04-30

## 2025-04-30 ENCOUNTER — TELEPHONE (OUTPATIENT)
Dept: ONCOLOGY | Age: 80
End: 2025-04-30

## 2025-04-30 DIAGNOSIS — D50.8 OTHER IRON DEFICIENCY ANEMIA: Primary | Chronic | ICD-10-CM

## 2025-04-30 NOTE — TELEPHONE ENCOUNTER
Patient is an established patient with Dr. Liz. She was last seen 11/22/2024.    Per Efren Ye PA-C at [facility] - Patient established with Dr. Liz.  Please evaluate for worsening anemia.  Patient has history of iron deficiency anemia.  Hemoglobin 8.4, hematocrit 28.6 (4/25/2025)    Once patient is scheduled please assign referral # [id number] to appointment. Thank you!

## 2025-05-02 ENCOUNTER — OFFICE VISIT (OUTPATIENT)
Dept: ONCOLOGY | Age: 80
End: 2025-05-02
Payer: MEDICARE

## 2025-05-02 ENCOUNTER — HOSPITAL ENCOUNTER (OUTPATIENT)
Dept: LAB | Age: 80
Discharge: HOME | End: 2025-05-02
Payer: MEDICARE

## 2025-05-02 VITALS
TEMPERATURE: 98 F | WEIGHT: 133.1 LBS | RESPIRATION RATE: 16 BRPM | BODY MASS INDEX: 24.49 KG/M2 | HEART RATE: 75 BPM | DIASTOLIC BLOOD PRESSURE: 78 MMHG | OXYGEN SATURATION: 100 % | HEIGHT: 62 IN | SYSTOLIC BLOOD PRESSURE: 145 MMHG

## 2025-05-02 DIAGNOSIS — D50.8 OTHER IRON DEFICIENCY ANEMIA: ICD-10-CM

## 2025-05-02 DIAGNOSIS — D47.2 MGUS (MONOCLONAL GAMMOPATHY OF UNKNOWN SIGNIFICANCE): ICD-10-CM

## 2025-05-02 DIAGNOSIS — D47.2 MGUS (MONOCLONAL GAMMOPATHY OF UNKNOWN SIGNIFICANCE): Primary | ICD-10-CM

## 2025-05-02 LAB
ALBUMIN SERPL-MCNC: 3.1 G/DL (ref 3.2–4.6)
ALBUMIN/GLOB SERPL: 0.9 (ref 1–1.9)
ALP SERPL-CCNC: 111 U/L (ref 35–104)
ALT SERPL-CCNC: 24 U/L (ref 8–45)
ANION GAP SERPL CALC-SCNC: 11 MMOL/L (ref 7–16)
AST SERPL-CCNC: 25 U/L (ref 15–37)
BASOPHILS # BLD: 0.01 K/UL (ref 0–0.2)
BASOPHILS NFR BLD: 0.2 % (ref 0–2)
BILIRUB SERPL-MCNC: 0.6 MG/DL (ref 0–1.2)
BUN SERPL-MCNC: 25 MG/DL (ref 8–23)
CALCIUM SERPL-MCNC: 9.9 MG/DL (ref 8.8–10.2)
CHLORIDE SERPL-SCNC: 108 MMOL/L (ref 98–107)
CO2 SERPL-SCNC: 21 MMOL/L (ref 20–29)
CREAT SERPL-MCNC: 1.18 MG/DL (ref 0.6–1.1)
DIFFERENTIAL METHOD BLD: ABNORMAL
EOSINOPHIL # BLD: 0.05 K/UL (ref 0–0.8)
EOSINOPHIL NFR BLD: 0.8 % (ref 0.5–7.8)
ERYTHROCYTE [DISTWIDTH] IN BLOOD BY AUTOMATED COUNT: 18.2 % (ref 11.9–14.6)
FERRITIN SERPL-MCNC: 30 NG/ML (ref 8–388)
GLOBULIN SER CALC-MCNC: 3.3 G/DL (ref 2.3–3.5)
GLUCOSE SERPL-MCNC: 167 MG/DL (ref 70–99)
HCT VFR BLD AUTO: 30 % (ref 35.8–46.3)
HGB BLD-MCNC: 8.5 G/DL (ref 11.7–15.4)
HGB RETIC QN AUTO: 24 PG (ref 29–35)
IMM GRANULOCYTES # BLD AUTO: 0.05 K/UL (ref 0–0.5)
IMM GRANULOCYTES NFR BLD AUTO: 0.8 % (ref 0–5)
IMM RETICS NFR: 32.2 % (ref 3–15.9)
IRON SATN MFR SERPL: 12 % (ref 20–50)
IRON SERPL-MCNC: 35 UG/DL (ref 35–100)
KAPPA LC FREE SER-MCNC: 53.3 MG/L (ref 2.4–20.7)
KAPPA LC FREE/LAMBDA FREE SER: 1.3 (ref 0.2–0.8)
LAMBDA LC FREE SERPL-MCNC: 40.2 MG/L (ref 4.2–27.7)
LDH SERPL L TO P-CCNC: 282 U/L (ref 127–281)
LYMPHOCYTES # BLD: 0.48 K/UL (ref 0.5–4.6)
LYMPHOCYTES NFR BLD: 7.3 % (ref 13–44)
MCH RBC QN AUTO: 23.8 PG (ref 26.1–32.9)
MCHC RBC AUTO-ENTMCNC: 28.3 G/DL (ref 31.4–35)
MCV RBC AUTO: 84 FL (ref 82–102)
MONOCYTES # BLD: 0.41 K/UL (ref 0.1–1.3)
MONOCYTES NFR BLD: 6.3 % (ref 4–12)
NEUTS SEG # BLD: 5.56 K/UL (ref 1.7–8.2)
NEUTS SEG NFR BLD: 84.6 % (ref 43–78)
NRBC # BLD: 0 K/UL (ref 0–0.2)
PLATELET # BLD AUTO: 182 K/UL (ref 150–450)
PMV BLD AUTO: 10.7 FL (ref 9.4–12.3)
POTASSIUM SERPL-SCNC: 4.6 MMOL/L (ref 3.5–5.1)
PROT SERPL-MCNC: 6.5 G/DL (ref 6.3–8.2)
RBC # BLD AUTO: 3.57 M/UL (ref 4.05–5.2)
RETICS # AUTO: 0.08 M/UL (ref 0.03–0.1)
RETICS/RBC NFR AUTO: 2.2 % (ref 0.3–2)
SODIUM SERPL-SCNC: 140 MMOL/L (ref 136–145)
TIBC SERPL-MCNC: 299 UG/DL (ref 240–450)
UIBC SERPL-MCNC: 264 UG/DL (ref 112–347)
VIT B12 SERPL-MCNC: 472 PG/ML (ref 193–986)
WBC # BLD AUTO: 6.6 K/UL (ref 4.3–11.1)

## 2025-05-02 PROCEDURE — 36415 COLL VENOUS BLD VENIPUNCTURE: CPT

## 2025-05-02 PROCEDURE — 99214 OFFICE O/P EST MOD 30 MIN: CPT | Performed by: INTERNAL MEDICINE

## 2025-05-02 PROCEDURE — 1123F ACP DISCUSS/DSCN MKR DOCD: CPT | Performed by: INTERNAL MEDICINE

## 2025-05-02 PROCEDURE — 85025 COMPLETE CBC W/AUTO DIFF WBC: CPT

## 2025-05-02 PROCEDURE — 3078F DIAST BP <80 MM HG: CPT | Performed by: INTERNAL MEDICINE

## 2025-05-02 PROCEDURE — 83550 IRON BINDING TEST: CPT

## 2025-05-02 PROCEDURE — 3077F SYST BP >= 140 MM HG: CPT | Performed by: INTERNAL MEDICINE

## 2025-05-02 PROCEDURE — 82607 VITAMIN B-12: CPT

## 2025-05-02 PROCEDURE — 1126F AMNT PAIN NOTED NONE PRSNT: CPT | Performed by: INTERNAL MEDICINE

## 2025-05-02 PROCEDURE — 0077U IG PARAPROTEIN QUAL BLD/UR: CPT

## 2025-05-02 PROCEDURE — G8428 CUR MEDS NOT DOCUMENT: HCPCS | Performed by: INTERNAL MEDICINE

## 2025-05-02 PROCEDURE — 83615 LACTATE (LD) (LDH) ENZYME: CPT

## 2025-05-02 PROCEDURE — 80053 COMPREHEN METABOLIC PANEL: CPT

## 2025-05-02 PROCEDURE — 82668 ASSAY OF ERYTHROPOIETIN: CPT

## 2025-05-02 PROCEDURE — 82784 ASSAY IGA/IGD/IGG/IGM EACH: CPT

## 2025-05-02 PROCEDURE — 82728 ASSAY OF FERRITIN: CPT

## 2025-05-02 PROCEDURE — 84238 ASSAY NONENDOCRINE RECEPTOR: CPT

## 2025-05-02 PROCEDURE — 1090F PRES/ABSN URINE INCON ASSESS: CPT | Performed by: INTERNAL MEDICINE

## 2025-05-02 PROCEDURE — 83540 ASSAY OF IRON: CPT

## 2025-05-02 PROCEDURE — G8399 PT W/DXA RESULTS DOCUMENT: HCPCS | Performed by: INTERNAL MEDICINE

## 2025-05-02 PROCEDURE — 1036F TOBACCO NON-USER: CPT | Performed by: INTERNAL MEDICINE

## 2025-05-02 PROCEDURE — G8420 CALC BMI NORM PARAMETERS: HCPCS | Performed by: INTERNAL MEDICINE

## 2025-05-02 PROCEDURE — 83521 IG LIGHT CHAINS FREE EACH: CPT

## 2025-05-02 PROCEDURE — 85046 RETICYTE/HGB CONCENTRATE: CPT

## 2025-05-02 ASSESSMENT — PATIENT HEALTH QUESTIONNAIRE - PHQ9
SUM OF ALL RESPONSES TO PHQ QUESTIONS 1-9: 1
2. FEELING DOWN, DEPRESSED OR HOPELESS: SEVERAL DAYS
SUM OF ALL RESPONSES TO PHQ QUESTIONS 1-9: 1
1. LITTLE INTEREST OR PLEASURE IN DOING THINGS: NOT AT ALL

## 2025-05-02 NOTE — PATIENT INSTRUCTIONS
Patient Information from Today's Visit    Diagnosis: MGUS/Anemia      Follow Up Instructions: 3 months      Treatment Summary has been discussed and given to patient: N/A      Current Labs:   Hospital Outpatient Visit on 05/02/2025   Component Date Value Ref Range Status    WBC 05/02/2025 6.6  4.3 - 11.1 K/uL Final    RBC 05/02/2025 3.57 (L)  4.05 - 5.2 M/uL Final    Hemoglobin 05/02/2025 8.5 (L)  11.7 - 15.4 g/dL Final    Hematocrit 05/02/2025 30.0 (L)  35.8 - 46.3 % Final    MCV 05/02/2025 84.0  82.0 - 102.0 FL Final    MCH 05/02/2025 23.8 (L)  26.1 - 32.9 PG Final    MCHC 05/02/2025 28.3 (L)  31.4 - 35.0 g/dL Final    RDW 05/02/2025 18.2 (H)  11.9 - 14.6 % Final    Platelets 05/02/2025 182  150 - 450 K/uL Final    MPV 05/02/2025 10.7  9.4 - 12.3 FL Final    nRBC 05/02/2025 0.00  0.0 - 0.2 K/uL Final    **Note: Absolute NRBC parameter is now reported with Hemogram**    Neutrophils % 05/02/2025 84.6 (H)  43.0 - 78.0 % Final    Lymphocytes % 05/02/2025 7.3 (L)  13.0 - 44.0 % Final    Monocytes % 05/02/2025 6.3  4.0 - 12.0 % Final    Eosinophils % 05/02/2025 0.8  0.5 - 7.8 % Final    Basophils % 05/02/2025 0.2  0.0 - 2.0 % Final    Immature Granulocytes % 05/02/2025 0.8  0.0 - 5.0 % Final    Neutrophils Absolute 05/02/2025 5.56  1.70 - 8.20 K/UL Final    Lymphocytes Absolute 05/02/2025 0.48 (L)  0.50 - 4.60 K/UL Final    Monocytes Absolute 05/02/2025 0.41  0.10 - 1.30 K/UL Final    Eosinophils Absolute 05/02/2025 0.05  0.00 - 0.80 K/UL Final    Basophils Absolute 05/02/2025 0.01  0.00 - 0.20 K/UL Final    Immature Granulocytes Absolute 05/02/2025 0.05  0.00 - 0.50 K/UL Final    Differential Type 05/02/2025 AUTOMATED    Final    Sodium 05/02/2025 140  136 - 145 mmol/L Final    Potassium 05/02/2025 4.6  3.5 - 5.1 mmol/L Final    Chloride 05/02/2025 108 (H)  98 - 107 mmol/L Final    CO2 05/02/2025 21  20 - 29 mmol/L Final    Anion Gap 05/02/2025 11  7 - 16 mmol/L Final    Glucose 05/02/2025 167 (H)  70 - 99 mg/dL Final

## 2025-05-02 NOTE — PROGRESS NOTES
[facility]: Office Visit New Patient H & P    Chief Complaint:    Chief Complaint   Patient presents with    Follow-up         History of Present Illness:  Ms. Carmona is a 79 y.o. female who presents today for evaluation regarding anemia and MGUS.  She has a history of anemia felt to be related to folate deficiency, she has been on MTX for RA in the past with poor tolerance, and now is on Orencia.  She underwent testing at her PCP which showed elevated polyclonal gammopathy, with an area of \"restricted mobility\" in the IgG and kappa lanes.  On one occasion she did have an M-spike to 0.4 g/dL, but this is was felt to be an MGUS rather than a plasma cell disorder.  Of note, she also has a history of iron deficiency with a history of iron infusions done previously.  No current symptoms.  She needs ongoing follow-up with serial paraprotein labs yearly and CBC/iron studies at least semi-annually, but with her most recent labs done in April we can wait until the fall to recheck these.  She will continue follow-up with Dr. Negro and Dr. Vega for her other chronic medical conditions.      History of Present Illness  The patient, an 79-year-old female, presents for follow-up regarding anemia and monoclonal gammopathy of undetermined significance (MGUS).    During the previous visit, the patient was hospitalized due to significant renal insufficiency. Although there has been an improvement in renal function, anemia remains unresolved. Hemoglobin levels have not normalized, which is atypical given the renal function improvement. Suspected iron deficiency is considered a contributing factor to the persistent anemia, and further diagnostic tests are planned to confirm this hypothesis.    The patient reports lower extremity edema, but there are no indications of gastrointestinal bleeding or hematochezia. Historically, she received annual iron infusions, but the physician that previously administered

## 2025-05-03 LAB — EPO SERPL-ACNC: 88.8 MIU/ML (ref 2.6–18.5)

## 2025-05-04 LAB — TRANSFERRIN SERPL-SCNC: 64.9 NMOL/L (ref 12.2–27.3)

## 2025-05-05 ENCOUNTER — RESULTS FOLLOW-UP (OUTPATIENT)
Dept: ONCOLOGY | Age: 80
End: 2025-05-05

## 2025-05-05 RX ORDER — HEPARIN SODIUM (PORCINE) LOCK FLUSH IV SOLN 100 UNIT/ML 100 UNIT/ML
500 SOLUTION INTRAVENOUS PRN
OUTPATIENT
Start: 2025-05-09

## 2025-05-05 RX ORDER — FAMOTIDINE 10 MG/ML
20 INJECTION, SOLUTION INTRAVENOUS
OUTPATIENT
Start: 2025-05-09

## 2025-05-05 RX ORDER — ONDANSETRON 2 MG/ML
8 INJECTION INTRAMUSCULAR; INTRAVENOUS
OUTPATIENT
Start: 2025-05-09

## 2025-05-05 RX ORDER — SODIUM CHLORIDE 9 MG/ML
5-250 INJECTION, SOLUTION INTRAVENOUS PRN
OUTPATIENT
Start: 2025-05-09

## 2025-05-05 RX ORDER — ACETAMINOPHEN 325 MG/1
650 TABLET ORAL
OUTPATIENT
Start: 2025-05-09

## 2025-05-05 RX ORDER — DIPHENHYDRAMINE HYDROCHLORIDE 50 MG/ML
50 INJECTION, SOLUTION INTRAMUSCULAR; INTRAVENOUS
OUTPATIENT
Start: 2025-05-09

## 2025-05-05 RX ORDER — HYDROCORTISONE SODIUM SUCCINATE 100 MG/2ML
100 INJECTION INTRAMUSCULAR; INTRAVENOUS
OUTPATIENT
Start: 2025-05-09

## 2025-05-05 RX ORDER — EPINEPHRINE 1 MG/ML
0.3 INJECTION, SOLUTION, CONCENTRATE INTRAVENOUS PRN
OUTPATIENT
Start: 2025-05-09

## 2025-05-05 RX ORDER — SODIUM CHLORIDE 9 MG/ML
INJECTION, SOLUTION INTRAVENOUS CONTINUOUS
OUTPATIENT
Start: 2025-05-09

## 2025-05-05 RX ORDER — SODIUM CHLORIDE 0.9 % (FLUSH) 0.9 %
5-40 SYRINGE (ML) INJECTION PRN
OUTPATIENT
Start: 2025-05-09

## 2025-05-05 RX ORDER — ALBUTEROL SULFATE 90 UG/1
4 INHALANT RESPIRATORY (INHALATION) PRN
OUTPATIENT
Start: 2025-05-09

## 2025-05-05 NOTE — TELEPHONE ENCOUNTER
----- Message from Dr. Zain Liz MD sent at 5/5/2025  2:28 PM EDT -----  sTfR confirms ongoing KEVIN, needs iron infusion arranged, Feraheme or whichever preparation approved by insurance    RN called patient to let her know that Dr. Liz recommends IV iron replacement after reviewing her labs. This will be 2 infusions, 1 week apart. Infusions scheduling will call patient to schedule. Patient verbalized understanding and is agreeable to proceed. We will see her back in August as scheduled for repeat lab/OV. No further questions for RN.

## 2025-05-06 LAB — IMMUNOLOGIST REVIEW: NORMAL

## 2025-05-12 ENCOUNTER — HOSPITAL ENCOUNTER (OUTPATIENT)
Dept: INFUSION THERAPY | Age: 80
Setting detail: INFUSION SERIES
Discharge: HOME | End: 2025-05-12
Payer: MEDICARE

## 2025-05-12 VITALS
RESPIRATION RATE: 16 BRPM | DIASTOLIC BLOOD PRESSURE: 55 MMHG | OXYGEN SATURATION: 93 % | HEART RATE: 78 BPM | TEMPERATURE: 97.7 F | SYSTOLIC BLOOD PRESSURE: 122 MMHG

## 2025-05-12 DIAGNOSIS — D50.9 IRON DEFICIENCY ANEMIA, UNSPECIFIED IRON DEFICIENCY ANEMIA TYPE: Primary | ICD-10-CM

## 2025-05-12 PROCEDURE — 6360000002 HC RX W HCPCS: Performed by: INTERNAL MEDICINE

## 2025-05-12 PROCEDURE — 96365 THER/PROPH/DIAG IV INF INIT: CPT

## 2025-05-12 PROCEDURE — 2580000003 HC RX 258: Performed by: INTERNAL MEDICINE

## 2025-05-12 PROCEDURE — 2500000003 HC RX 250 WO HCPCS: Performed by: INTERNAL MEDICINE

## 2025-05-12 RX ORDER — SODIUM CHLORIDE 0.9 % (FLUSH) 0.9 %
5-40 SYRINGE (ML) INJECTION PRN
Status: DISCONTINUED | OUTPATIENT
Start: 2025-05-12 | End: 2025-05-13 | Stop reason: HOSPADM

## 2025-05-12 RX ORDER — EPINEPHRINE 1 MG/ML
0.3 INJECTION, SOLUTION, CONCENTRATE INTRAVENOUS PRN
Status: DISCONTINUED | OUTPATIENT
Start: 2025-05-12 | End: 2025-05-13 | Stop reason: HOSPADM

## 2025-05-12 RX ORDER — SODIUM CHLORIDE 9 MG/ML
INJECTION, SOLUTION INTRAVENOUS CONTINUOUS
OUTPATIENT
Start: 2025-05-19

## 2025-05-12 RX ORDER — SODIUM CHLORIDE 9 MG/ML
5-250 INJECTION, SOLUTION INTRAVENOUS PRN
OUTPATIENT
Start: 2025-05-19

## 2025-05-12 RX ORDER — ACETAMINOPHEN 325 MG/1
650 TABLET ORAL
OUTPATIENT
Start: 2025-05-19

## 2025-05-12 RX ORDER — HEPARIN 100 UNIT/ML
500 SYRINGE INTRAVENOUS PRN
OUTPATIENT
Start: 2025-05-19

## 2025-05-12 RX ORDER — EPINEPHRINE 1 MG/ML
0.3 INJECTION, SOLUTION, CONCENTRATE INTRAVENOUS PRN
OUTPATIENT
Start: 2025-05-19

## 2025-05-12 RX ORDER — ONDANSETRON 2 MG/ML
8 INJECTION INTRAMUSCULAR; INTRAVENOUS
Status: DISCONTINUED | OUTPATIENT
Start: 2025-05-12 | End: 2025-05-13 | Stop reason: HOSPADM

## 2025-05-12 RX ORDER — HYDROCORTISONE SODIUM SUCCINATE 100 MG/2ML
100 INJECTION INTRAMUSCULAR; INTRAVENOUS
Status: DISCONTINUED | OUTPATIENT
Start: 2025-05-12 | End: 2025-05-13 | Stop reason: HOSPADM

## 2025-05-12 RX ORDER — DIPHENHYDRAMINE HYDROCHLORIDE 50 MG/ML
50 INJECTION, SOLUTION INTRAMUSCULAR; INTRAVENOUS
OUTPATIENT
Start: 2025-05-19

## 2025-05-12 RX ORDER — SODIUM CHLORIDE 9 MG/ML
5-250 INJECTION, SOLUTION INTRAVENOUS PRN
Status: CANCELLED | OUTPATIENT
Start: 2025-05-19

## 2025-05-12 RX ORDER — ALBUTEROL SULFATE 90 UG/1
4 INHALANT RESPIRATORY (INHALATION) PRN
Status: DISCONTINUED | OUTPATIENT
Start: 2025-05-12 | End: 2025-05-13 | Stop reason: HOSPADM

## 2025-05-12 RX ORDER — ONDANSETRON 2 MG/ML
8 INJECTION INTRAMUSCULAR; INTRAVENOUS
OUTPATIENT
Start: 2025-05-19

## 2025-05-12 RX ORDER — ACETAMINOPHEN 325 MG/1
650 TABLET ORAL
Status: DISCONTINUED | OUTPATIENT
Start: 2025-05-12 | End: 2025-05-13 | Stop reason: HOSPADM

## 2025-05-12 RX ORDER — SODIUM CHLORIDE 0.9 % (FLUSH) 0.9 %
5-40 SYRINGE (ML) INJECTION PRN
Status: CANCELLED | OUTPATIENT
Start: 2025-05-19

## 2025-05-12 RX ORDER — ALBUTEROL SULFATE 90 UG/1
4 INHALANT RESPIRATORY (INHALATION) PRN
OUTPATIENT
Start: 2025-05-19

## 2025-05-12 RX ORDER — HYDROCORTISONE SODIUM SUCCINATE 100 MG/2ML
100 INJECTION INTRAMUSCULAR; INTRAVENOUS
OUTPATIENT
Start: 2025-05-19

## 2025-05-12 RX ORDER — SODIUM CHLORIDE 9 MG/ML
5-250 INJECTION, SOLUTION INTRAVENOUS PRN
Status: DISCONTINUED | OUTPATIENT
Start: 2025-05-12 | End: 2025-05-13 | Stop reason: HOSPADM

## 2025-05-12 RX ORDER — DIPHENHYDRAMINE HYDROCHLORIDE 50 MG/ML
50 INJECTION, SOLUTION INTRAMUSCULAR; INTRAVENOUS
Status: DISCONTINUED | OUTPATIENT
Start: 2025-05-12 | End: 2025-05-13 | Stop reason: HOSPADM

## 2025-05-12 RX ORDER — SODIUM CHLORIDE 9 MG/ML
INJECTION, SOLUTION INTRAVENOUS CONTINUOUS
Status: DISCONTINUED | OUTPATIENT
Start: 2025-05-12 | End: 2025-05-13 | Stop reason: HOSPADM

## 2025-05-12 RX ADMIN — SODIUM CHLORIDE, PRESERVATIVE FREE 10 ML: 5 INJECTION INTRAVENOUS at 13:03

## 2025-05-12 RX ADMIN — SODIUM CHLORIDE 100 ML/HR: 0.9 INJECTION, SOLUTION INTRAVENOUS at 12:54

## 2025-05-12 RX ADMIN — FERUMOXYTOL 510 MG: 510 INJECTION INTRAVENOUS at 13:13

## 2025-05-12 ASSESSMENT — PAIN DESCRIPTION - LOCATION: LOCATION: LEG

## 2025-05-12 ASSESSMENT — PAIN DESCRIPTION - ORIENTATION: ORIENTATION: LEFT

## 2025-05-12 ASSESSMENT — PAIN DESCRIPTION - DESCRIPTORS: DESCRIPTORS: ACHING

## 2025-05-12 ASSESSMENT — PAIN DESCRIPTION - ONSET: ONSET: ON-GOING

## 2025-05-12 ASSESSMENT — PAIN - FUNCTIONAL ASSESSMENT: PAIN_FUNCTIONAL_ASSESSMENT: PREVENTS OR INTERFERES SOME ACTIVE ACTIVITIES AND ADLS

## 2025-05-12 ASSESSMENT — PAIN SCALES - GENERAL: PAINLEVEL_OUTOF10: 6

## 2025-05-12 NOTE — PROGRESS NOTES
Arrived to the [facility]fusion Center ambulatory with cane.  Feraheme infusion completed. Patient tolerated well.   Any issues or concerns during appointment: none.  Patient aware of next infusion appointment on 5/19/2025 (date) at 1330 (time).  Patient aware of next lab and [facility] office visit on 8/1/2025 (date) at 0930 (time).  Patient instructed to call provider with temperature of 100.4 or greater or nausea/vomiting/ diarrhea or pain not controlled by medications  Discharged home ambulatory with cane.

## 2025-05-12 NOTE — PROGRESS NOTES
SPIRITUAL HEALTH   Note for Initial Spiritual Assessment                   Infusion Department   Name: Luci Carmona           Age: 79 y.o.    Gender: female          MRN: [id number]  Roman Catholic: Jehovah's witness       Preferred Language: English    Date: 05/12/25  Visit Time: Begin Time: 1340 End Time : 1355        Visit Summary: Initial visit with Ms. Carmona to convey care and concern and to explore any spiritual/emotional needs while patient was having an iron infusion this afternoon. Ms. Carmona appeared in good spirits as she shared about her health, family, and Judaism. As requested I offered prayer. It was a pleasure to visit Ms. Carmona today.  follow-up is available upon referral.         Encounter Overview/Reason: Initial Encounter  Service Provided For: Patient     Patient was available.    Yanet, Belief, Meaning:   Patient is connected with a yanet tradition or spiritual practice and has beliefs or practices that help with coping during difficult times  Family/Friends No family/friends present during this visit  Rituals (if applicable)      Importance and Influence:  Patient may have spiritual/personal beliefs that influence decisions regarding their health  Family/Friends No family/friends present    Community:  Patient   [place] Community and Extended family      Assessment and Plan of Care:   Emotions Expressed by Patient:   Assessment: Calm, Coping    Interventions by :   Intervention: Active listening, Discussed belief system/Confucianist practices/yanet, Explored Coping Skills/Resources, Prayer (assurance of)/[place]     Result/ Response by Patient:   Outcome: Coping, Engaged in conversation, Peace    Patient Plan of Care:    Spiritual care available upon referral.      Electronically signed by BULMARO Hall , on 5/12/2025 at 2:59 PM.  [facility]

## 2025-05-19 ENCOUNTER — LAB (OUTPATIENT)
Dept: FAMILY MEDICINE CLINIC | Facility: CLINIC | Age: 80
End: 2025-05-19

## 2025-05-19 ENCOUNTER — HOSPITAL ENCOUNTER (OUTPATIENT)
Dept: INFUSION THERAPY | Age: 80
Setting detail: INFUSION SERIES
Discharge: HOME | End: 2025-05-19
Payer: MEDICARE

## 2025-05-19 VITALS
HEART RATE: 84 BPM | TEMPERATURE: 98.1 F | OXYGEN SATURATION: 95 % | SYSTOLIC BLOOD PRESSURE: 144 MMHG | DIASTOLIC BLOOD PRESSURE: 57 MMHG

## 2025-05-19 DIAGNOSIS — E03.9 ACQUIRED HYPOTHYROIDISM: Chronic | ICD-10-CM

## 2025-05-19 DIAGNOSIS — D50.9 IRON DEFICIENCY ANEMIA, UNSPECIFIED IRON DEFICIENCY ANEMIA TYPE: Primary | ICD-10-CM

## 2025-05-19 LAB — TSH, 3RD GENERATION: 0.26 UIU/ML (ref 0.27–4.2)

## 2025-05-19 PROCEDURE — 2500000003 HC RX 250 WO HCPCS: Performed by: INTERNAL MEDICINE

## 2025-05-19 PROCEDURE — 6360000002 HC RX W HCPCS: Performed by: INTERNAL MEDICINE

## 2025-05-19 PROCEDURE — 96365 THER/PROPH/DIAG IV INF INIT: CPT

## 2025-05-19 PROCEDURE — 2580000003 HC RX 258: Performed by: INTERNAL MEDICINE

## 2025-05-19 RX ORDER — HYDROCORTISONE SODIUM SUCCINATE 100 MG/2ML
100 INJECTION INTRAMUSCULAR; INTRAVENOUS
OUTPATIENT
Start: 2025-05-19

## 2025-05-19 RX ORDER — EPINEPHRINE 1 MG/ML
0.3 INJECTION, SOLUTION, CONCENTRATE INTRAVENOUS PRN
OUTPATIENT
Start: 2025-05-19

## 2025-05-19 RX ORDER — SODIUM CHLORIDE 0.9 % (FLUSH) 0.9 %
5-40 SYRINGE (ML) INJECTION PRN
Status: DISCONTINUED | OUTPATIENT
Start: 2025-05-19 | End: 2025-05-20 | Stop reason: HOSPADM

## 2025-05-19 RX ORDER — ALBUTEROL SULFATE 90 UG/1
4 INHALANT RESPIRATORY (INHALATION) PRN
OUTPATIENT
Start: 2025-05-19

## 2025-05-19 RX ORDER — SODIUM CHLORIDE 0.9 % (FLUSH) 0.9 %
5-40 SYRINGE (ML) INJECTION PRN
OUTPATIENT
Start: 2025-05-19

## 2025-05-19 RX ORDER — SODIUM CHLORIDE 9 MG/ML
INJECTION, SOLUTION INTRAVENOUS CONTINUOUS
OUTPATIENT
Start: 2025-05-19

## 2025-05-19 RX ORDER — HEPARIN 100 UNIT/ML
500 SYRINGE INTRAVENOUS PRN
OUTPATIENT
Start: 2025-05-19

## 2025-05-19 RX ORDER — SODIUM CHLORIDE 9 MG/ML
5-250 INJECTION, SOLUTION INTRAVENOUS PRN
OUTPATIENT
Start: 2025-05-19

## 2025-05-19 RX ORDER — SODIUM CHLORIDE 9 MG/ML
5-250 INJECTION, SOLUTION INTRAVENOUS PRN
Status: CANCELLED | OUTPATIENT
Start: 2025-05-19

## 2025-05-19 RX ORDER — ONDANSETRON 2 MG/ML
8 INJECTION INTRAMUSCULAR; INTRAVENOUS
OUTPATIENT
Start: 2025-05-19

## 2025-05-19 RX ORDER — SODIUM CHLORIDE 9 MG/ML
5-250 INJECTION, SOLUTION INTRAVENOUS PRN
Status: DISCONTINUED | OUTPATIENT
Start: 2025-05-19 | End: 2025-05-20 | Stop reason: HOSPADM

## 2025-05-19 RX ORDER — ACETAMINOPHEN 325 MG/1
650 TABLET ORAL
OUTPATIENT
Start: 2025-05-19

## 2025-05-19 RX ORDER — DIPHENHYDRAMINE HYDROCHLORIDE 50 MG/ML
50 INJECTION, SOLUTION INTRAMUSCULAR; INTRAVENOUS
OUTPATIENT
Start: 2025-05-19

## 2025-05-19 RX ADMIN — SODIUM CHLORIDE, PRESERVATIVE FREE 10 ML: 5 INJECTION INTRAVENOUS at 14:16

## 2025-05-19 RX ADMIN — FERUMOXYTOL 510 MG: 510 INJECTION INTRAVENOUS at 13:28

## 2025-05-19 ASSESSMENT — PAIN SCALES - GENERAL: PAINLEVEL_OUTOF10: 0

## 2025-05-19 NOTE — PROGRESS NOTES
Farhateheme completed, pt tolerated well, monitored for 30 min, no reactions, discharged home ambulatory

## 2025-05-20 ENCOUNTER — RESULTS FOLLOW-UP (OUTPATIENT)
Dept: FAMILY MEDICINE CLINIC | Facility: CLINIC | Age: 80
End: 2025-05-20

## 2025-05-20 DIAGNOSIS — E03.9 ACQUIRED HYPOTHYROIDISM: Primary | ICD-10-CM

## 2025-05-20 RX ORDER — LEVOTHYROXINE SODIUM 50 UG/1
TABLET ORAL
Qty: 60 TABLET | Refills: 0 | Status: SHIPPED | OUTPATIENT
Start: 2025-05-20

## 2025-05-20 RX ORDER — LEVOTHYROXINE SODIUM 75 UG/1
TABLET ORAL
Qty: 24 TABLET | Refills: 0 | Status: SHIPPED | OUTPATIENT
Start: 2025-05-20

## 2025-05-20 NOTE — RESULT ENCOUNTER NOTE
Let patient know her thyroid hormone is still not regulated quite enough.  It looks like we need to decrease her thyroid hormone just a little bit.  She is to take Synthroid 50 mcg 5 days a week, and Synthroid 75 mcg 2 days a week.  Follow-up in 3 months for lab only.

## 2025-06-25 ENCOUNTER — OFFICE VISIT (OUTPATIENT)
Dept: FAMILY MEDICINE CLINIC | Facility: CLINIC | Age: 80
End: 2025-06-25
Payer: MEDICARE

## 2025-06-25 VITALS
WEIGHT: 138 LBS | BODY MASS INDEX: 25.4 KG/M2 | TEMPERATURE: 98.1 F | DIASTOLIC BLOOD PRESSURE: 63 MMHG | SYSTOLIC BLOOD PRESSURE: 136 MMHG | OXYGEN SATURATION: 97 % | HEART RATE: 76 BPM | HEIGHT: 62 IN

## 2025-06-25 DIAGNOSIS — K21.00 GASTROESOPHAGEAL REFLUX DISEASE WITH ESOPHAGITIS WITHOUT HEMORRHAGE: Primary | ICD-10-CM

## 2025-06-25 DIAGNOSIS — R19.7 DIARRHEA, UNSPECIFIED TYPE: ICD-10-CM

## 2025-06-25 DIAGNOSIS — R60.0 PERIPHERAL EDEMA: ICD-10-CM

## 2025-06-25 PROCEDURE — 3075F SYST BP GE 130 - 139MM HG: CPT

## 2025-06-25 PROCEDURE — 1036F TOBACCO NON-USER: CPT

## 2025-06-25 PROCEDURE — 1123F ACP DISCUSS/DSCN MKR DOCD: CPT

## 2025-06-25 PROCEDURE — 1090F PRES/ABSN URINE INCON ASSESS: CPT

## 2025-06-25 PROCEDURE — 3078F DIAST BP <80 MM HG: CPT

## 2025-06-25 PROCEDURE — G8399 PT W/DXA RESULTS DOCUMENT: HCPCS

## 2025-06-25 PROCEDURE — 1159F MED LIST DOCD IN RCRD: CPT

## 2025-06-25 PROCEDURE — G8417 CALC BMI ABV UP PARAM F/U: HCPCS

## 2025-06-25 PROCEDURE — G8427 DOCREV CUR MEDS BY ELIG CLIN: HCPCS

## 2025-06-25 PROCEDURE — 99213 OFFICE O/P EST LOW 20 MIN: CPT

## 2025-06-25 RX ORDER — PANTOPRAZOLE SODIUM 20 MG/1
20 TABLET, DELAYED RELEASE ORAL DAILY
Qty: 30 TABLET | Refills: 3 | Status: SHIPPED | OUTPATIENT
Start: 2025-06-25

## 2025-06-25 NOTE — ASSESSMENT & PLAN NOTE
Chronic, not at goal (unstable),     Orders:    Occult Blood, Fecal; Future    Culture, Stool; Future    External Referral To Gastroenterology

## 2025-06-25 NOTE — PROGRESS NOTES
at your next visit.    Medication List:    Current Outpatient Medications   Medication Sig Dispense Refill    levothyroxine (SYNTHROID) 50 MCG tablet 1 p.o. Monday, Tuesday, Wednesday, Thursday, Friday. 60 tablet 0    levothyroxine (SYNTHROID) 75 MCG tablet 1 p.o. on Saturday and Sunday 24 tablet 0    amLODIPine (NORVASC) 10 MG tablet Take 1 tablet by mouth daily for blood pressure 90 tablet 3    levothyroxine (SYNTHROID) 75 MCG tablet One po qod atlernating with 50 mcg 90 tablet 1    levothyroxine (SYNTHROID) 50 MCG tablet One po qod alt with 75 mcg 90 tablet 1    acetaminophen (TYLENOL) 650 MG extended release tablet Take by mouth every 8 (eight) hours      bismuth subsalicylate (PEPTO BISMOL) 262 MG/15ML suspension Take 15 mLs by mouth every 6 hours as needed for Indigestion      predniSONE (DELTASONE) 5 MG tablet Take 1 tablet by mouth daily      losartan (COZAAR) 50 MG tablet Take 1 tablet by mouth daily (Patient not taking: Reported on 6/25/2025) 90 tablet 3    latanoprost (XALATAN) 0.005 % ophthalmic solution  (Patient not taking: Reported on 6/25/2025)       No current facility-administered medications for this visit.            Return to Office: Return if symptoms worsen or fail to improve, for as scheduled 8/19/2025.    This document may have been prepared at least partially through the use of voice recognition software. Although effort is taken to assure the accuracy of this document, it is possible that grammatical, syntax,  or spelling errors may occur.            An electronic signature was used to authenticate this note.  --VILMA ESPINOZA MD     The patient (or guardian, if applicable) and other individuals in attendance with the patient were advised that Artificial Intelligence will be utilized during this visit to record, process the conversation to generate a clinical note, and support improvement of the AI technology. The patient (or guardian, if applicable) and other individuals in attendance at

## 2025-08-12 ENCOUNTER — LAB (OUTPATIENT)
Dept: FAMILY MEDICINE CLINIC | Facility: CLINIC | Age: 80
End: 2025-08-12

## 2025-08-12 DIAGNOSIS — E11.9 TYPE 2 DIABETES MELLITUS WITHOUT COMPLICATION, WITHOUT LONG-TERM CURRENT USE OF INSULIN (HCC): ICD-10-CM

## 2025-08-12 LAB
ALBUMIN SERPL-MCNC: 2.9 G/DL (ref 3.2–4.6)
ALBUMIN/GLOB SERPL: 1 (ref 1–1.9)
ALP SERPL-CCNC: 114 U/L (ref 35–104)
ALT SERPL-CCNC: 21 U/L (ref 8–45)
ANION GAP SERPL CALC-SCNC: 13 MMOL/L (ref 7–16)
AST SERPL-CCNC: 25 U/L (ref 15–37)
BILIRUB SERPL-MCNC: 0.6 MG/DL (ref 0–1.2)
BUN SERPL-MCNC: 24 MG/DL (ref 8–23)
CALCIUM SERPL-MCNC: 9.7 MG/DL (ref 8.8–10.2)
CHLORIDE SERPL-SCNC: 110 MMOL/L (ref 98–107)
CHOLEST SERPL-MCNC: 206 MG/DL (ref 0–200)
CO2 SERPL-SCNC: 22 MMOL/L (ref 20–29)
CREAT SERPL-MCNC: 1.23 MG/DL (ref 0.6–1.1)
EST. AVERAGE GLUCOSE BLD GHB EST-MCNC: 121 MG/DL
GLOBULIN SER CALC-MCNC: 2.9 G/DL (ref 2.3–3.5)
GLUCOSE SERPL-MCNC: 99 MG/DL (ref 70–99)
HBA1C MFR BLD: 5.8 % (ref 0–5.6)
HDLC SERPL-MCNC: 91 MG/DL (ref 40–60)
HDLC SERPL: 2.3 (ref 0–5)
LDLC SERPL CALC-MCNC: 94 MG/DL (ref 0–100)
POTASSIUM SERPL-SCNC: 4.2 MMOL/L (ref 3.5–5.1)
PROT SERPL-MCNC: 5.8 G/DL (ref 6.3–8.2)
SODIUM SERPL-SCNC: 145 MMOL/L (ref 136–145)
TRIGL SERPL-MCNC: 105 MG/DL (ref 0–150)
VLDLC SERPL CALC-MCNC: 21 MG/DL (ref 6–23)

## 2025-08-19 ENCOUNTER — OFFICE VISIT (OUTPATIENT)
Dept: FAMILY MEDICINE CLINIC | Facility: CLINIC | Age: 80
End: 2025-08-19

## 2025-08-19 VITALS
HEIGHT: 62 IN | DIASTOLIC BLOOD PRESSURE: 55 MMHG | OXYGEN SATURATION: 98 % | SYSTOLIC BLOOD PRESSURE: 139 MMHG | BODY MASS INDEX: 25.21 KG/M2 | HEART RATE: 66 BPM | RESPIRATION RATE: 16 BRPM | WEIGHT: 137 LBS | TEMPERATURE: 97.1 F

## 2025-08-19 DIAGNOSIS — D50.8 OTHER IRON DEFICIENCY ANEMIA: Chronic | ICD-10-CM

## 2025-08-19 DIAGNOSIS — I10 ESSENTIAL HYPERTENSION: Chronic | ICD-10-CM

## 2025-08-19 DIAGNOSIS — M05.9 RHEUMATOID ARTHRITIS WITH POSITIVE RHEUMATOID FACTOR, INVOLVING UNSPECIFIED SITE (HCC): Chronic | ICD-10-CM

## 2025-08-19 DIAGNOSIS — E11.9 TYPE 2 DIABETES MELLITUS WITHOUT COMPLICATION, WITHOUT LONG-TERM CURRENT USE OF INSULIN (HCC): Primary | ICD-10-CM

## 2025-08-19 DIAGNOSIS — K21.00 GASTROESOPHAGEAL REFLUX DISEASE WITH ESOPHAGITIS WITHOUT HEMORRHAGE: Chronic | ICD-10-CM

## 2025-08-19 DIAGNOSIS — E03.9 ACQUIRED HYPOTHYROIDISM: Chronic | ICD-10-CM

## 2025-08-19 LAB — TSH, 3RD GENERATION: 3.07 UIU/ML (ref 0.27–4.2)

## 2025-08-19 RX ORDER — FLUCONAZOLE 100 MG/1
TABLET ORAL
COMMUNITY
Start: 2025-08-15

## 2025-08-20 RX ORDER — LOSARTAN POTASSIUM 50 MG/1
50 TABLET ORAL DAILY
COMMUNITY
End: 2025-08-20 | Stop reason: SDUPTHER

## 2025-08-20 RX ORDER — LOSARTAN POTASSIUM 50 MG/1
50 TABLET ORAL DAILY
Qty: 90 TABLET | Refills: 3 | Status: SHIPPED | OUTPATIENT
Start: 2025-08-20

## (undated) DEVICE — CONTAINER PREFIL FRMLN 40ML --

## (undated) DEVICE — KENDALL RADIOLUCENT FOAM MONITORING ELECTRODE RECTANGULAR SHAPE: Brand: KENDALL

## (undated) DEVICE — SNARE POLYP SM W13MMXL240CM SHTH DIA2.4MM OVL FLX DISP

## (undated) DEVICE — SYRINGE, LUER SLIP, STERILE, 60ML: Brand: MEDLINE

## (undated) DEVICE — FORCEPS BX L240CM JAW DIA2.8MM L CAP W/ NDL MIC MESH TOOTH

## (undated) DEVICE — LUBE JELLY FOIL PACK 1.4 OZ: Brand: MEDLINE INDUSTRIES, INC.

## (undated) DEVICE — THE TORRENT IRRIGATION TUBING IS INTENDED TO PROVIDE IRRIGATION VIA IRRIGATION FLUIDS, SUCH AS STERILE WATER, DURING GASTROINTESTINAL ENDOSCOPIC PROCEDURES WHEN USED IN CONJUNCTION WITH AN IRRIGATION PUMP OR ELECTROSURGICAL UNIT.: Brand: TORRENT

## (undated) DEVICE — CONNECTOR TBNG OD5-7MM O2 END DISP

## (undated) DEVICE — BRUSH CYTO L230CM OD2.4MM FOR COLONSCP

## (undated) DEVICE — BLOCK BITE AD 60FR W/ VELC STRP ADDRESSES MOST PT AND

## (undated) DEVICE — GAUZE,SPONGE,4"X4",12PLY,WOVEN,NS,LF: Brand: MEDLINE

## (undated) DEVICE — CANNULA NSL ORAL AD FOR CAPNOFLEX CO2 O2 AIRLFE

## (undated) DEVICE — CONTAINER FORMALIN PFILLED 10% NBF 40ML

## (undated) DEVICE — AIRLIFE™ OXYGEN TUBING 7 FEET (2.1 M) CRUSH RESISTANT OXYGEN TUBING, VINYL TIPPED: Brand: AIRLIFE™

## (undated) DEVICE — NEEDLE SYR 18GA L1.5IN RED PLAS HUB S STL BLNT FILL W/O

## (undated) DEVICE — SINGLE PORT MANIFOLD: Brand: NEPTUNE 2

## (undated) DEVICE — SYRINGE MED 3ML CLR PLAS STD N CTRL LUERLOCK TIP DISP

## (undated) DEVICE — SYRINGE MED 10ML LUERLOCK TIP W/O SFTY DISP

## (undated) DEVICE — TRAP SPEC RETRV CLR PLAS POLYP IN LN SUCT QUIK CTCH

## (undated) DEVICE — YANKAUER,BULB TIP,W/O VENT,RIGID,STERILE: Brand: MEDLINE